# Patient Record
Sex: FEMALE | Race: OTHER | HISPANIC OR LATINO | URBAN - METROPOLITAN AREA
[De-identification: names, ages, dates, MRNs, and addresses within clinical notes are randomized per-mention and may not be internally consistent; named-entity substitution may affect disease eponyms.]

---

## 2017-06-01 ENCOUNTER — OUTPATIENT (OUTPATIENT)
Dept: OUTPATIENT SERVICES | Facility: HOSPITAL | Age: 47
LOS: 1 days | Discharge: HOME | End: 2017-06-01

## 2017-06-19 ENCOUNTER — APPOINTMENT (OUTPATIENT)
Dept: BREAST CENTER | Facility: CLINIC | Age: 47
End: 2017-06-19

## 2017-06-28 DIAGNOSIS — N93.0 POSTCOITAL AND CONTACT BLEEDING: ICD-10-CM

## 2017-07-05 ENCOUNTER — APPOINTMENT (OUTPATIENT)
Dept: BREAST CENTER | Facility: CLINIC | Age: 47
End: 2017-07-05

## 2017-07-05 VITALS
SYSTOLIC BLOOD PRESSURE: 124 MMHG | HEIGHT: 60 IN | BODY MASS INDEX: 28.47 KG/M2 | WEIGHT: 145 LBS | DIASTOLIC BLOOD PRESSURE: 80 MMHG

## 2017-12-11 ENCOUNTER — APPOINTMENT (OUTPATIENT)
Dept: BREAST CENTER | Facility: CLINIC | Age: 47
End: 2017-12-11

## 2018-09-12 ENCOUNTER — OUTPATIENT (OUTPATIENT)
Dept: OUTPATIENT SERVICES | Facility: HOSPITAL | Age: 48
LOS: 1 days | Discharge: HOME | End: 2018-09-12

## 2018-09-12 DIAGNOSIS — R92.2 INCONCLUSIVE MAMMOGRAM: ICD-10-CM

## 2018-09-12 DIAGNOSIS — Z12.31 ENCOUNTER FOR SCREENING MAMMOGRAM FOR MALIGNANT NEOPLASM OF BREAST: ICD-10-CM

## 2019-03-13 ENCOUNTER — OUTPATIENT (OUTPATIENT)
Dept: OUTPATIENT SERVICES | Facility: HOSPITAL | Age: 49
LOS: 1 days | Discharge: HOME | End: 2019-03-13

## 2019-03-13 DIAGNOSIS — R92.8 OTHER ABNORMAL AND INCONCLUSIVE FINDINGS ON DIAGNOSTIC IMAGING OF BREAST: ICD-10-CM

## 2019-03-18 ENCOUNTER — OUTPATIENT (OUTPATIENT)
Dept: OUTPATIENT SERVICES | Facility: HOSPITAL | Age: 49
LOS: 1 days | Discharge: HOME | End: 2019-03-18

## 2019-03-18 ENCOUNTER — RESULT REVIEW (OUTPATIENT)
Age: 49
End: 2019-03-18

## 2019-03-18 DIAGNOSIS — R92.8 OTHER ABNORMAL AND INCONCLUSIVE FINDINGS ON DIAGNOSTIC IMAGING OF BREAST: ICD-10-CM

## 2019-03-21 LAB — SURGICAL PATHOLOGY STUDY: SIGNIFICANT CHANGE UP

## 2019-03-25 DIAGNOSIS — N60.32 FIBROSCLEROSIS OF LEFT BREAST: ICD-10-CM

## 2019-03-25 DIAGNOSIS — N60.22 FIBROADENOSIS OF LEFT BREAST: ICD-10-CM

## 2019-03-25 DIAGNOSIS — N62 HYPERTROPHY OF BREAST: ICD-10-CM

## 2019-03-25 DIAGNOSIS — C50.912 MALIGNANT NEOPLASM OF UNSPECIFIED SITE OF LEFT FEMALE BREAST: ICD-10-CM

## 2019-03-25 DIAGNOSIS — N63.20 UNSPECIFIED LUMP IN THE LEFT BREAST, UNSPECIFIED QUADRANT: ICD-10-CM

## 2019-04-08 ENCOUNTER — APPOINTMENT (OUTPATIENT)
Dept: BREAST CENTER | Facility: CLINIC | Age: 49
End: 2019-04-08
Payer: COMMERCIAL

## 2019-04-08 DIAGNOSIS — Z78.9 OTHER SPECIFIED HEALTH STATUS: ICD-10-CM

## 2019-04-08 PROCEDURE — 99214 OFFICE O/P EST MOD 30 MIN: CPT

## 2019-04-08 NOTE — HISTORY OF PRESENT ILLNESS
[FreeTextEntry1] : Frances is a 48 premenopausal F who presents with a US detected L breast cancer, ER/NV positive, her 2 PENDING, [id number].  \par \par Her work up was as follows: \par 3/13/19 -- L dx tomosynthesis; b/l US \par RIGHT \par -@1N2, mass measuring 1.2 x 1.4 x 0.5 cm, no change, deemed BIRADS 3\par -@6N4, mass measuring 0.6 x 0.9 x 0.4 cm, no change, deemed BIRADS 3\par LEFT: \par -@12N2, cyst measuring 1 x 1 x 0.8 cm \par -@5N5, mass measuring 1.2 x 1.4 x 0.5 cm, no change, deemed BIRADS 3\par -@3-4N4, il defined mass, measuring 0.5 x 0.7 x 0.6 cm, mammographically occult, BIOPSY \par BIRADS 4 of above lesion\par \par 3/18/19 -- L US CNBx @3-4N4\par -micropscopic IDC, well differentiated\par -ER/NV positive, Her 2 pending (Abel )\par \par She denies any breast pain, but has palpated a RIGHT lateral breast lesion that has been present for 5 years and remains unchanged.  She has not palapted any abnormal mass in her LEFT breast, denies any nipple discharge or retraction and has no other breast related complaints. \par \par HISTORICAL RISK FACTORS: \par -1 prior breast biopsy as above \par -no family history of breast or ovarian cancer \par - , age at first live birth was 24\par -no prior OCP use

## 2019-04-08 NOTE — PAST MEDICAL HISTORY
[Menstruating] : The patient is menstruating [Menarche Age ____] : age at menarche was [unfilled] [Total Preg ___] : G[unfilled] [Live Births ___] : P[unfilled]  [Age At Live Birth ___] : Age at live birth: [unfilled] [History of Hormone Replacement Treatment] : has no history of hormone replacement treatment [FreeTextEntry5] : denies  [FreeTextEntry6] : denies [FreeTextEntry7] : denies  [FreeTextEntry8] : yes in past

## 2019-04-08 NOTE — PHYSICAL EXAM
[Normocephalic] : normocephalic [Atraumatic] : atraumatic [EOMI] : extra ocular movement intact [No Supraclavicular Adenopathy] : no supraclavicular adenopathy [No Cervical Adenopathy] : no cervical adenopathy [Examined in the supine and seated position] : examined in the supine and seated position [Symmetrical] : symmetrical [No dominant masses] : no dominant masses in right breast  [No dominant masses] : no dominant masses left breast [No Nipple Retraction] : no left nipple retraction [No Nipple Discharge] : no left nipple discharge [de-identified] : dense breast tissue in b/l UOQ, but no suspicious masses palpated in either breast

## 2019-04-08 NOTE — ASSESSMENT
[FreeTextEntry1] : Frances is a 48 premenopausal F who has LEFT breast cancer, microinvasive on pathology, [id number], ER/TN positive, Her 2 pending, stage 1 A breast cancer. \par \par This was not readily palpable on exam and she did not have any other suspicious lesions palpated in either breast nor did she have any lymphadenopathy.  \par \par We have discussed her new diagnosis of breast cancer.  She is currently a stage 1 breast cancer, based off AJCC 8th edition.  We discussed her surgical and other treatment options at this time. \par \par In regards to her left sided breast cancer, she is a great candidate for either breast conservation surgery or a mastectomy.  This lesion is located about 4 cm from the nipple based off the US so a nipple sparing mastectomy could be attempted, as long as the intraop frozen section shows that the retroareolar margin is negative for invasive disease.  There is no difference in survival between a lumpectomy + radiation therapy and a mastectomy.  However the rate of local recurrence is slightly higher with the lumpectomy. The rate of recurrence with a mastectomy is not zero, however, and is likely closer to 4-9%.  \par \par Regardless of if she chooses a mastectomy or a lumpectomy, she will need evaluation of her axillary lymph nodes via a sentinel lymph node biopsy.  This is done by injecting the perioareolar breast tissue with dye prior to the surgery and removing 1-5 lymph nodes that are the first to drain the breast.  If any of these lymph nodes are positive, we would need to discuss the necessity for further axillary surgery at that point. \par \par She is not sure whether or not she would like to pursue breast conservation vs. a mastectomy, but is leaning towards a lumpectomy.  Because her left breast lesion is not palpable on clinical exam, so she will need preoperative localization with an RF localizer for the left breast mass.  In the interim, we will obtain a bilateral breast MRI to further evaluate the extent of her disease, given her young age and dense breasts on mammogram. \par \par We did briefly discuss the different radiation options.  If she got a mastectomy, she would likely only need radiation therapy if she had a lot of positive margins or if her axillary lymph nodes were positive for metastatic disease.  If she undergoes a lumpectomy, she is a candidate for both partial breast irradiation and whole breast irradiation, pending her Her2 receptor status.  We briefly discussed the differences between these two modalities.\par \par In regards to systemic therapy, we briefly discussed both chemotherapy and endocrine therapy. Her Her2 status is still pending, so these recommendations could change based off the final pathology.  However, if her lymph nodes are negative, than she may not require any chemotherapy.  If the tumor is larger than 1 cm and her 2 negative, we would likely need to send an additional study on her tumor sample, called an oncotype DX to make the determination regarding if chemotherapy would help her.  At the completion of all these treatments, she should get endocrine therapy, at current time she is premenopausal and would be recommended to get tamoxifen for a total of at least 5 years.  \par \par In regards to reconstruction, if she decides to proceed with a mastectomy,  she has the option for undergoing immediate breast reconstruction.  \par \par She will need a breast MRI to determine extent of disease.  This will be scheduled for her. \par \par Because of her young age at diagnosis, she qualifies for genetic testing.  COLOR genetic testing will be performed today. \par \par All of her questions were answered.  She knows to call with any further questions or concerns. I will see her after she gets her MRI breast. \par \par Of note, her daughter was present for the entirety of the consultation. \par  phone was used: Javier #[id number]\par \par PLAN\par -breast MRI \par -COLOR genetic testing\par -if breast MRI does not reveal any additional areas of disease, then she will proceed with a LEFT WIDE LOCAL EXCISION WITH RF LOCALIZER, SENTINEL LYMPH NODE BIOPSY, POSSIBLE AXILLARY LYMPH NODE DISSECTION\par -DIAGNOSIS: LEFT BREAST CANCER

## 2019-04-08 NOTE — DATA REVIEWED
[FreeTextEntry1] : EXAM: US BREAST COMPLETE BI \par EXAM: MG TOMOSYNTHESIS DX BI \par \par \par PROCEDURE DATE: 09/12/2018 \par \par \par \par INTERPRETATION: Clinical History / Reason for exam: Short-term follow-up of \par bilateral probably benign masses first identified on 6/1/2017 \par \par The patient reports her last clinical breast examination was performed 1 \par month ago. \par \par Family history: None. \par \par Comparisons: 6/1/2017. \par \par Views obtained:Bilateral tomographic full field CC and MLO views. \par \par Computer-aided detection was utilized in the interpretation of this \par examination. \par \par Breast composition:The breasts are heterogeneously dense, which may obscure \par small masses. \par \par Findings: \par \par Mammogram: \par \par No suspicious mass, microcalcifications or areas of architectural distortion \par is seen either breast. When compared to the previous examinations there is \par no significant interval change and nothing to suggest malignancy. \par \par Ultrasound: \par \par Bilateral whole breast ultrasound was performed. \par \par Right breast: \par At the 1:00 position 2 cm the nipple, there is a stable probably benign mass \par measuring 0.7 x 1.5 x 0.5 cm. \par \par At the 6:00 position 4 cm the nipple, there is a stable probably benign mass \par measuring 0.9 x 1.3 x 0.5 cm. \par \par No additional suspicious solid or cystic masses are identified in the right \par breast. No axillary adenopathy. \par \par Left breast: \par \par At the 5:00 position 5 cm from nipple, there is a stable probably benign \par hypoechoic mass measuring 1.3 x 1.4 x 0.6 cm. This was previously labeled as \par 4 to 5:00 position 5 cm from the nipple. \par \par At the 12:00 position 8 cm from the nipple, there is a stable probably \par benign mass measuring 0.8 x 0.8 x 0.4 cm. \par \par Previously identified mass at the 7 to 8:00 position 3 cm the nipple is not \par seen on the current exam and is therefore benign in etiology. \par \par No additional suspicious solid or cystic masses are identified in the left \par breast. No axillary adenopathy. \par \par Impression: No mammographic evidence of malignancy. Stable bilateral \par probably benign masses as above. \par \par Recommendation: Follow-up breast ultrasound in 6 months. Targeted 6 month \par sonographic follow-up of the right breast 1:00 position and 6:00 position \par and the left breast 5:00 position and 12:00 position is recommended. \par \par BI-RADS category 3: Probably Benign \par \par \par The above findings and recommendations were discussed with the patient at \par the time of the examination. \par \par \par \par \par \par \par EDY GRADY M.D., ATTENDING RADIOLOGIST \par This document has been electronically signed. Sep 12 2018 12:46PM \par \par EXAM: MG MAMMO DIAG W CRISTHIAN LT# \par EXAM: US BREAST LIMITED BI \par \par *** ADDENDUM 03/20/2019 *** \par \par This is a correction to the original report. \par \par Recommendation: Ultrasound guided biopsy. \par \par BI-RADS Category 4: Suspicious \par \par \par *** END OF ADDENDUM 03/20/2019 *** \par \par \par \par PROCEDURE DATE: 03/13/2019 \par \par \par \par INTERPRETATION: Clinical History / Reason for exam: Patient presents for \par follow-up of probably benign right breast masses at the 1:00 and 6:00 \par location and left breast at the 5:00 and 12:00 location since June 2017. \par \par Diagnostic unilateral left mammogram including tomosynthesis was performed \par and submitted for evaluation. Comparison is made to the prior study dated \par September 12, 2018 and June 1, 2017. \par \par Targeted Bilateral Breast Sonogram: \par Again identified is a mass in the right breast 1:00 location 2 cm from the \par nipple which is without significant change measuring 0.7 cm x 1.3 cm x 0.5 \par cm and at the 6:00 location 4 cm from the nipple measuring 0.6 cm x 0.9 cm x \par 0.4 cm. These are probably benign. \par \par In the left breast at the 12:00 position 2 cm from the nipple, there is a \par cyst measuring 1.0 cm x 1.0 cm x 0.8 cm. No suspicious masses are seen in \par this location. Again identified in the left breast at the 5:00 location 5 cm \par from the nipple is a mass measuring 1.2 cm x 1.4 cm x 0.5 cm which is \par without significant change and probably benign. \par \par At the 3-4 o'clock location 4 cm from the nipple, there is a ill-defined \par mass measuring 0.5 cm x 0.7 cm x 0.6 cm which is not seen mammographically. \par An ultrasound guided core biopsy is recommended. \par \par Impression: Stable probably benign right breast masses at the 1:00 location \par 2 cm from the nipple and 6:00 location 4 cm from the nipple as above. \par \par Stable probably benign left breast mass at the 5:00 location 5 cm from the \par nipple as above. Left breast cyst at the 12:00 location 2 cm from the nipple \par as above. \par \par Mammographically occult ill-defined left breast mass at the 3-4 o'clock \par location 4 cm from the nipple as above. An ultrasound guided core biopsy is \par recommended. \par \par Recommendation: Follow-up bilateral diagnostic mammogram and ultrasound in 6 \par months. \par \par [facility] Barker of Dr. Shyanne Godinez was notified of these findings on March \par 15, 2018 at 9:00 AM with read back. \par \par BI-RADS category 3: Probably Benign \par \par \par ***Please see the addendum at the top of this report. It may contain \par additional important information or changes.**** \par \par \par \par \par MADDIE STEINER M.D., ATTENDING RADIOLOGIST \par This document has been electronically signed. Mar 15 2019 9:00AM \par Addend: MADDIE STEINER M.D., ATTENDING RADIOLOGIST \par This addendum was electronically signed on: Mar 20 2019 11:59AM. \par \par EXAM: US BX BRST 1ST LT SISC \par \par *** ADDENDUM 03/22/2019 *** \par \par Histology: \par - microscopic focus of invasive well differentiated ductal \par carcinoma \par - proliferative type fibrocystic changes including dominant\par florid duct hyperplasia, stromal fibrosis, sclerosing adenosis, \par and columnar cell change/hyperplasia. \par \par Findings a malignant concordant. \par \par Recommendation: Surgical and oncologic follow-up is recommended. \par \par The findings and recommendation were discussed with the patient on 3/22/2019 \par at 10:23 AM through a . \par \par The findings and recommendation were discussed with Dr. Carlton on 3/22/2019 \par at 10:00 AM with read back. \par \par \par *** END OF ADDENDUM 03/22/2019 *** \par \par \par \par PROCEDURE DATE: 03/18/2019 \par \par \par \par INTERPRETATION: Clinical History / Reason for exam: Indeterminate left \par breast mass. \par \par Images were reviewed. Informed consent was obtained including a discussion \par of risks and benefits of the procedure as well as questioning about patient \par allergies. The patient safety checklist, including time out procedure, was \par used. \par \par The mass in the 3 to 4 o clock position 4 cm from the nipple was identified. \par Under sterile conditions and after local infiltration with 5 mL buffered 1% \par lidocaine a 14g spring loaded core biopsy needle was inserted. Under \par ultrasound guidance the needle was positioned adjacent to the mass and 4 \par core samples were obtained. The needle was removed. A marking clip ([employer] \par top-hat) was deployed under ultrasound guidance. Manual compression was \par applied to achieve hemostasis. \par \par The patient tolerated the procedure well and there was no immediate post \par procedure complication. The specimens were sent to the laboratory for \par analysis. \par \par A post procedure mammogram was obtained and demonstrated the clip to be in \par good position. \par \par IMPRESSION: \par \par Successful ultrasound guided core biopsy of the left breast. \par \par Histology: Pending, to be reported in an addendum. \par \par ***Please see the addendum at the top of this report. It may contain \par additional important information or changes.**** \par \par \par \par \par EDY GRADY M.D., ATTENDING RADIOLOGIST \par This document has been electronically signed. Mar 18 2019 3:33PM \par Addend: EDY GRADY M.D., ATTENDING RADIOLOGIST \par This addendum was electronically signed on: Mar 22 2019 10:23AM. \par \par \par

## 2019-04-18 ENCOUNTER — FORM ENCOUNTER (OUTPATIENT)
Age: 49
End: 2019-04-18

## 2019-04-19 ENCOUNTER — OUTPATIENT (OUTPATIENT)
Dept: OUTPATIENT SERVICES | Facility: HOSPITAL | Age: 49
LOS: 1 days | Discharge: HOME | End: 2019-04-19
Payer: OTHER GOVERNMENT

## 2019-04-19 DIAGNOSIS — C50.412 MALIGNANT NEOPLASM OF UPPER-OUTER QUADRANT OF LEFT FEMALE BREAST: ICD-10-CM

## 2019-04-19 PROCEDURE — 77049 MRI BREAST C-+ W/CAD BI: CPT | Mod: 26

## 2019-04-21 ENCOUNTER — FORM ENCOUNTER (OUTPATIENT)
Age: 49
End: 2019-04-21

## 2019-04-21 ENCOUNTER — RESULT CHARGE (OUTPATIENT)
Age: 49
End: 2019-04-21

## 2019-04-22 ENCOUNTER — APPOINTMENT (OUTPATIENT)
Dept: INTERNAL MEDICINE | Facility: CLINIC | Age: 49
End: 2019-04-22

## 2019-04-22 ENCOUNTER — OUTPATIENT (OUTPATIENT)
Dept: OUTPATIENT SERVICES | Facility: HOSPITAL | Age: 49
LOS: 1 days | Discharge: HOME | End: 2019-04-22
Payer: SUBSIDIZED

## 2019-04-22 ENCOUNTER — OUTPATIENT (OUTPATIENT)
Dept: OUTPATIENT SERVICES | Facility: HOSPITAL | Age: 49
LOS: 1 days | Discharge: HOME | End: 2019-04-22

## 2019-04-22 ENCOUNTER — OTHER (OUTPATIENT)
Age: 49
End: 2019-04-22

## 2019-04-22 VITALS
SYSTOLIC BLOOD PRESSURE: 133 MMHG | DIASTOLIC BLOOD PRESSURE: 85 MMHG | WEIGHT: 173 LBS | BODY MASS INDEX: 33.96 KG/M2 | HEART RATE: 59 BPM | HEIGHT: 60 IN | TEMPERATURE: 97.6 F

## 2019-04-22 DIAGNOSIS — Z01.818 ENCOUNTER FOR OTHER PREPROCEDURAL EXAMINATION: ICD-10-CM

## 2019-04-22 DIAGNOSIS — C50.412 MALIGNANT NEOPLASM OF UPPER-OUTER QUADRANT OF LEFT FEMALE BREAST: ICD-10-CM

## 2019-04-22 PROCEDURE — 71045 X-RAY EXAM CHEST 1 VIEW: CPT | Mod: 26

## 2019-04-22 NOTE — PHYSICAL EXAM
[Well Nourished] : well nourished [No Acute Distress] : no acute distress [Normal Sclera/Conjunctiva] : normal sclera/conjunctiva [Well Developed] : well developed [Well-Appearing] : well-appearing [PERRL] : pupils equal round and reactive to light [EOMI] : extraocular movements intact [Normal Outer Ear/Nose] : the outer ears and nose were normal in appearance [No JVD] : no jugular venous distention [Normal Oropharynx] : the oropharynx was normal [Supple] : supple [No Lymphadenopathy] : no lymphadenopathy [Thyroid Normal, No Nodules] : the thyroid was normal and there were no nodules present [Clear to Auscultation] : lungs were clear to auscultation bilaterally [No Respiratory Distress] : no respiratory distress  [Normal Rate] : normal rate  [No Accessory Muscle Use] : no accessory muscle use [Regular Rhythm] : with a regular rhythm [Normal S1, S2] : normal S1 and S2 [No Carotid Bruits] : no carotid bruits [No Murmur] : no murmur heard [No Varicosities] : no varicosities [No Abdominal Bruit] : a ~M bruit was not heard ~T in the abdomen [Pedal Pulses Present] : the pedal pulses are present [No Edema] : there was no peripheral edema [No Extremity Clubbing/Cyanosis] : no extremity clubbing/cyanosis [Soft] : abdomen soft [No Palpable Aorta] : no palpable aorta [Non-distended] : non-distended [Non Tender] : non-tender [No HSM] : no HSM [Normal Bowel Sounds] : normal bowel sounds [No Masses] : no abdominal mass palpated [Normal Posterior Cervical Nodes] : no posterior cervical lymphadenopathy [Normal Anterior Cervical Nodes] : no anterior cervical lymphadenopathy [No Joint Swelling] : no joint swelling [No CVA Tenderness] : no CVA  tenderness [No Spinal Tenderness] : no spinal tenderness [Grossly Normal Strength/Tone] : grossly normal strength/tone [No Rash] : no rash [Normal Gait] : normal gait [Coordination Grossly Intact] : coordination grossly intact [No Focal Deficits] : no focal deficits [Deep Tendon Reflexes (DTR)] : deep tendon reflexes were 2+ and symmetric [Normal Affect] : the affect was normal [Normal Insight/Judgement] : insight and judgment were intact [de-identified] : MILD TENDERNESS IN LEFT BREAST

## 2019-04-22 NOTE — ASSESSMENT
[FreeTextEntry1] : 47 yo female with left breast mass for pre op clearance.\par non smoker, non alcoholic\par no past medical or surgical hx\par \par Left breast cancer, stage 1, ER/AR+\par will send pre op lab testing\par follow up in 2 weeks.\par \par

## 2019-04-22 NOTE — HISTORY OF PRESENT ILLNESS
[de-identified] : 47 yo female is here for pre op clearance for breast cancer surgery.\par no new complaints.\par patient was positive for left breast mass in mammogram and s/p biopsy.\par no any significant medical history.\par non smoker non alcoholic\par no family Hx of cancer\par no pain in breast or discharge\par LMP march 24.

## 2019-04-30 ENCOUNTER — FORM ENCOUNTER (OUTPATIENT)
Age: 49
End: 2019-04-30

## 2019-05-01 ENCOUNTER — OUTPATIENT (OUTPATIENT)
Dept: OUTPATIENT SERVICES | Facility: HOSPITAL | Age: 49
LOS: 1 days | Discharge: HOME | End: 2019-05-01
Payer: SUBSIDIZED

## 2019-05-01 DIAGNOSIS — R91.1 SOLITARY PULMONARY NODULE: ICD-10-CM

## 2019-05-01 PROCEDURE — 71260 CT THORAX DX C+: CPT | Mod: 26

## 2019-05-07 ENCOUNTER — FORM ENCOUNTER (OUTPATIENT)
Age: 49
End: 2019-05-07

## 2019-05-08 ENCOUNTER — RESULT REVIEW (OUTPATIENT)
Age: 49
End: 2019-05-08

## 2019-05-08 ENCOUNTER — OUTPATIENT (OUTPATIENT)
Dept: OUTPATIENT SERVICES | Facility: HOSPITAL | Age: 49
LOS: 1 days | Discharge: HOME | End: 2019-05-08
Payer: SUBSIDIZED

## 2019-05-08 DIAGNOSIS — R92.8 OTHER ABNORMAL AND INCONCLUSIVE FINDINGS ON DIAGNOSTIC IMAGING OF BREAST: ICD-10-CM

## 2019-05-08 PROCEDURE — 88305 TISSUE EXAM BY PATHOLOGIST: CPT | Mod: 26

## 2019-05-08 PROCEDURE — 19085 BX BREAST 1ST LESION MR IMAG: CPT | Mod: RT

## 2019-05-08 PROCEDURE — 19086 BX BREAST ADD LESION MR IMAG: CPT | Mod: RT

## 2019-05-08 PROCEDURE — 77065 DX MAMMO INCL CAD UNI: CPT | Mod: 26,RT

## 2019-05-09 LAB — SURGICAL PATHOLOGY STUDY: SIGNIFICANT CHANGE UP

## 2019-05-16 ENCOUNTER — APPOINTMENT (OUTPATIENT)
Dept: BREAST CENTER | Facility: CLINIC | Age: 49
End: 2019-05-16
Payer: COMMERCIAL

## 2019-05-16 LAB
ALBUMIN SERPL ELPH-MCNC: 4.3 G/DL
ALP BLD-CCNC: 64 U/L
ALT SERPL-CCNC: 11 U/L
ANION GAP SERPL CALC-SCNC: 11 MMOL/L
APTT BLD: 35.7 SEC
AST SERPL-CCNC: 13 U/L
BILIRUB SERPL-MCNC: 0.3 MG/DL
BUN SERPL-MCNC: 15 MG/DL
CALCIUM SERPL-MCNC: 9.1 MG/DL
CHLORIDE SERPL-SCNC: 103 MMOL/L
CO2 SERPL-SCNC: 25 MMOL/L
CREAT SERPL-MCNC: 0.9 MG/DL
GLUCOSE SERPL-MCNC: 81 MG/DL
INR PPP: 0.96 RATIO
POTASSIUM SERPL-SCNC: 4.6 MMOL/L
PROT SERPL-MCNC: 7.3 G/DL
PT BLD: 11 SEC
SODIUM SERPL-SCNC: 139 MMOL/L

## 2019-05-16 PROCEDURE — 99214 OFFICE O/P EST MOD 30 MIN: CPT

## 2019-05-16 NOTE — DATA REVIEWED
[FreeTextEntry1] : EXAM: MR BREAST WAW IC BI \par \par \par PROCEDURE DATE: 04/19/2019 \par \par \par \par \par INTERPRETATION: Clinical History / Reason for exam: Extent of disease. \par Recent diagnosis of invasive ductal carcinoma at the left breast 3 to 4:00 \par position. \par \par Additional history: No family history of breast cancer. \par \par Technique: Breast MRI is performed at 1.5 T with the patient prone and the \par breasts in a dedicated breast coil. Following a 3 plane localizer, sagittal \par T1 weighted, fat-saturated T1 weighted and fat saturated T2-weighted \par sequence; dynamic contrast enhanced sagittal images; and delayed \par post-contrast axial fat-saturated T1 weighted images were obtained. 7 mL \par gadolinium contrast was injected and 0.5 mL was discarded. Subtraction and \par MIP images were reviewed. DorsaVI software was used in interpretation. \par \par Comparison: No prior MRI for comparison. Correlation made with studies \par dating back to 2017. \par \par Findings: \par \par Amount of fibroglandular tissue: Heterogeneous fibroglandular tissue \par \par Background parenchymal enhancement: Marked, Symmetric; lowers the \par sensitivity of breast MRI. \par \par RIGHT BREAST: \par \par - In the inferior right breast, there is regional nonmass enhancement \par measuring up to 6.7 cm in AP dimension. This is best seen on series 700, \par image 175 and series 8, image 33. \par - There are multiple cysts. \par - The nipple and skin appear normal. There is no axillary adenopathy. \par \par LEFT BREAST: \par \par - In the lateral left breast middle depth, there is an irregular enhancing \par mass measuring 0.9 cm with associated susceptibility artifact, consistent \par with the index lesion. \par - There are multiple cysts. \par - The nipple and skin appear normal. There is no axillary adenopathy. \par \par The imaged portions of the chest and abdomen demonstrates a signal \par abnormality in the anterior right lung base. (Series 8, image 51). \par \par Impression: \par \par Index left breast lesion as above. Scattered bilateral cysts. \par \par Indeterminate regional enhancement measuring up to 6.7 cm in AP dimension. \par MRI guided biopsy of the most anterior and posterior extent is recommended. \par \par Signal abnormality in the anterior right lung base. Follow-up chest CT for \par further characterization is recommended. \par \par Recommendation: MRI guided biopsy. \par \par BI-RADS Category 4: Suspicious \par \par The above findings and recommendations were discussed with Dr. Tiwari on \par 4/22/2019 at 5:00 PM. \par \par \par \par \par EDWAR PEREZ M.D., RESIDENT RADIOLOGIST \par This document has been electronically signed. \par EDY GRADY M.D., ATTENDING RADIOLOGIST \par This document has been electronically signed. Apr 22 2019 5:00PM \par \par EXAM: MR BX BRST 1ST RT SISC \par EXAM: MR BX BRST ADD RT SISC \par \par *** ADDENDUM 05/09/2019 *** \par \par HISTOLOGY: FINAL DIAGNOSIS \par \par 1. BREAST, RIGHT NON-MASS LIKE ENHANCEMENT SPANNING 6.7 CM ANTERIOR EXTENT, \par MRI GUIDED NEEDLE CORE BIOPSIES: \par \par - BENIGN BREAST TISSUE WITH PROLIFERATIVE TYPE FIBROCYSTIC CHANGES INCLUDING \par USUAL TYPE DUCT HYPERPLASIA, STROMAL FIBROSIS WITH PSEUDOANGIOMATOUS STROMAL \par HYPERPLASIA (PASH), ADENOSIS, BLUNT DUCT ADENOSIS, COLUMNAR CELL \par CHANGE/HYPERPLASIA WITHOUT \par ATYPIA, APOCRINE METAPLASIA, AND MICROCALCIFICATIONS. \par - FIBROADENOMATOID NODULE. \par - FOCAL SECRETORY/LACTATIONAL CHANGES ASSOCIATED WITH \par CALCIFICATIONS. \par \par This is benign concordant histology. \par \par \par \par 2. BREAST, RIGHT NON-MASS LIKE ENHANCEMENT SPANNING 6.7 CM POSTERIOR EXTENT, \par MRI GUIDED NEEDLE CORE BIOPSIES: \par \par - BENIGN BREAST TISSUE WITH PROLIFERATIVE TYPE FIBROCYSTIC CHANGES INCLUDING \par USUAL TYPE DUCT HYPERPLASIA, STROMAL FIBROSIS, ADENOSIS, SCLEROSING \par ADENOSIS, BLUNT DUCT ADENOSIS, APOCRINE METAPLASIA-LINED CYSTS, COLUMNAR \par CELL CHANGE/HYPERPLASIA WITHOUT \par ATYPIA, AND MICROCALCIFICATIONS. \par - FOCAL SECRETORY/LACTATIONAL CHANGES. \par \par This is benign concordant histology. \par \par The patient was notified of the above benign concordant histologic results \par on May 9, 2018 at 3:50 PM with read back. The patient was told to follow-up \par in 6 months for a breast MRI as per guidelines. \par \par \par *** END OF ADDENDUM 05/09/2019 *** \par \par \par \par PROCEDURE DATE: 05/08/2019 \par \par \par \par \par INTERPRETATION: Clinical History / Reason for exam: MRI guided biopsy of \par the right breast for nonmass enhancement in the lower inner quadrant \par spanning 6.7 cm. \par \par After the risks, benefits alternatives of MRI guided biopsy were discussed \par with the patient, informed consent was obtained, including consent for MRI \par intravenous contrast. \par \par Technique: MR imaging of the right breast was performed using a 1.5 abhilash GE \par magnet, dedicated 8 channel breast coil and Envia LÃ¡E platform. 7 mls of MRI \par intravenous contrast was administered. IV access was obtained on site. The \par examination was performed with a vitamin E capsule placed on the skin, and \par biopsy grid in place. Sequences include sagittal dynamic fat suppressed pre \par and post MRI intravenous contrast gradient echo imaging. \par \par Findings: \par \par SITE 1: Right breast nonmass enhancement in the lower inner quadrant \par (anterior extent). \par \par Using the usual sterile technique and 1% lidocaine as superficial \par anesthesia, and lidocaine with epinephrine as deep anesthesia a small \par dermatotomy was made in the skin. Under MR guidance, using a 9 gauge \par vacuum-assisted device, the previously described nonmass enhancement in the \par lower inner quadrant of the right breast (anterior extent) was sampled, with \par 12 core biopsies obtained. Post biopsy imaging in the sagittal plane \par demonstrated a visible cavity, and diminished enhancement, at the biopsy \par site. A hourglass-shaped biopsy clip was placed in the biopsy cavity. \par \par \par SITE 2: Right breast nonmass enhancement lower inner quadrant of (posterior \par extent). \par \par Using the usual sterile technique and 1% lidocaine as superficial \par anesthesia, and lidocaine with epinephrine as deep anesthesia a small \par dermatotomy was made in the skin. Under MR guidance, using a 9 gauge \par vacuum-assisted device, the previously described nonmass enhancement in the \par lower inner quadrant of the right breast (posterior extent) was sampled, \par with 12 core biopsies obtained. Post biopsy imaging in the sagittal plane \par demonstrated a visible cavity, and diminished enhancement, at the biopsy \par site. A mini-cork shaped biopsy clip was placed in the biopsy cavity. \par \par Hemostases was maintained with manual pressure to each biopsy site. Sterile \par dressing was applied to each biopsy site. \par \par The patient was then escorted, [facility], with an \par unilateral right mammogram was performed and demonstrates both clips to be \par in their expected locations. \par \par The patient tolerated the procedure well and was discharged home in stable \par condition, with written discharge instructions. \par \par Impression: Successful MR guided biopsy of right breast x 2. \par \par Histopathology:Pending, to be reported in an addendum. \par \par ***Please see the addendum at the top of this report. It may contain \par additional important information or changes.**** \par \par \par \par \par MADDIE STEINER M.D., ATTENDING RADIOLOGIST \par This document has been electronically signed. May 8 2019 11:15AM \par Addend: MADDIE STEINER M.D., ATTENDING RADIOLOGIST \par This addendum was electronically signed on: May 9 2019 3:56PM. \par \par EXAM: CT CHEST IC \par \par \par PROCEDURE DATE: 05/01/2019 \par \par \par \par \par INTERPRETATION: CLINICAL HISTORY: Anterior right lung base signal \par abnormality seen incidentally on MR breasts. \par \par Technique: Contiguous axial CT images were obtained from the thoracic inlet \par to the upper abdomen, following the administration of intravenous contrast. \par Reconstructions in the coronal and sagittal planes were also acquired. \par \par COMPARISON: No prior cross-sectional imaging the chest is available for \par direct comparison. Correlation is made with MR breast dated 4/19/2019. \par \par FINDINGS: \par \par LUNGS, PLEURA, AIRWAYS: Focal atelectasis of the anterior right middle lobe. \par No lobar consolidations, pleural effusions, or pneumothorax. The central \par tracheobronchial tree is patent. No bronchiectasis or honeycombing. \par \par PULMONARY NODULES: \par \par 3 mm solid nodule, right upper lobe (series 4, image 58). \par \par THORACIC NODES: No mediastinal, hilar, or axillary lymphadenopathy. \par \par MEDIASTINUM/GREAT VESSELS: No pericardial effusion. Heart size is within \par normal limits. The aorta and main pulmonary artery are of normal caliber. \par \par PARTIALLY IMAGED ABDOMEN: Cholelithiasis. \par \par BONES/SOFT TISSUES: Unremarkable. \par \par \par IMPRESSION: \par \par 3 mm right upper lobe solid nodule. Per Fleischner guidelines, no further \par imaging is required for this lesion in low-risk patients. In high-risk \par patients, follow-up CT in 12 months may be considered to establish stability. \par \par Focal atelectasis of the anterior right middle lobe. \par \par \par \par \par \par SHAILESH VILLARREAL M.D., RESIDENT RADIOLOGIST \par This document has been electronically signed. \par DIANNA STEWART M.D., ATTENDING RADIOLOGIST \par This document has been electronically signed. May 2 2019 11:15AM \par

## 2019-05-16 NOTE — PHYSICAL EXAM
[Normocephalic] : normocephalic [Atraumatic] : atraumatic [EOMI] : extra ocular movement intact [No Cervical Adenopathy] : no cervical adenopathy [No Supraclavicular Adenopathy] : no supraclavicular adenopathy [Examined in the supine and seated position] : examined in the supine and seated position [No dominant masses] : no dominant masses in right breast  [Symmetrical] : symmetrical [No dominant masses] : no dominant masses left breast [No Nipple Retraction] : no left nipple retraction [No Nipple Discharge] : no left nipple discharge [No Axillary Lymphadenopathy] : no left axillary lymphadenopathy [Soft] : abdomen soft [Not Tender] : non-tender [No Edema] : no edema [No Rashes] : no rashes [No Ulceration] : no ulceration [de-identified] : dense breast tissue in b/l UOQ [de-identified] : ecchymoses present in the LIQ in area of her most recent MRI guided biopsy  [de-identified] : has a nodularity in the lateral breast, may be a hematoma vs. her known breast cancer; no other suspicious masses palpated

## 2019-05-16 NOTE — REVIEW OF SYSTEMS
[Negative] : Heme/Lymph [As Noted in HPI] : as noted in HPI [Breast Lump] : breast lump [Skin Lesions] : no skin lesions [Skin Wound] : no skin wound [Breast Pain] : no breast pain

## 2019-05-16 NOTE — HISTORY OF PRESENT ILLNESS
[FreeTextEntry1] : Frances is a 48 premenopausal F who presents with a US detected L breast cancer, ER/LA positive, her 2 unable to be tested, [id number] stage 1 breast cancer.  \par \par Her work up was as follows: \par 3/13/19 -- L dx tomosynthesis; b/l US \par RIGHT \par -@1N2, mass measuring 1.2 x 1.4 x 0.5 cm, no change, deemed BIRADS 3\par -@6N4, mass measuring 0.6 x 0.9 x 0.4 cm, no change, deemed BIRADS 3\par LEFT: \par -@12N2, cyst measuring 1 x 1 x 0.8 cm \par -@5N5, mass measuring 1.2 x 1.4 x 0.5 cm, no change, deemed BIRADS 3\par -@3-4N4, il defined mass, measuring 0.5 x 0.7 x 0.6 cm, mammographically occult, BIOPSY \par BIRADS 4 of above lesion\par \par 3/18/19 -- L US CNBx @3-4N4\par -micropscopic IDC, well differentiated\par -ER/LA positive, Her 2 pending (Abel )\par \par 19 -- breast MRI \par R: 6.7 cm nonmass enhancement in inferior R breast --> rec BIOPSY of anterior and posterior aspect \par L: lateral mid depth, irregular enhancing mass measuring 0.9 cm consistent with known cancer \par -b/l breast cysts \par -abnormal signal in anterior right lung base \par \par 19 -- CT Chest \par -3 mm solid nodule in RUL \par -rec: no further imaging if patient is low risk \par \par 19 -- R MRI guided biopsy x 2 \par -benign proliferative type FC changes x 2\par \par COLOR genetic testing \par -negative for any mutations \par \par She denies any breast pain, but has palpated a RIGHT lateral breast lesion that has been present for 5 years and remains unchanged.  She has not palapted any abnormal mass in her LEFT breast, denies any nipple discharge or retraction and has no other breast related complaints. \par \par HISTORICAL RISK FACTORS: \par -1 prior breast biopsy as above \par -no family history of breast or ovarian cancer \par - , age at first live birth was 24\par -no prior OCP use \par \par INTERVAL HISTORY: \carlos Daniels is a 48 premenopausal F with left breast cancer.  She presents today to discuss her surgical planning. \par \par Since her last visit, she underwent a breast MRI ON 19 which revealed 6.7 cm of nonmass enhancement in her right breast.  The most anterior and posterior aspect of this area was biopsied under MRI guidance and was found to be benign.  She also had a nonspecific signal abnormality in her right anterior lung base so a chest CT was recommended.  This was performed on 19 which revealed a 3mm solid nodule in her RUL for which no further imaging was recommended as she was a low risk patient.  \par \par She has no new breast related complaints

## 2019-05-16 NOTE — ASSESSMENT
[FreeTextEntry1] : Frances is a 48 premenopausal F who has LEFT breast cancer, microinvasive on pathology, [id number], ER/PA positive, Her 2 unable to be tested on her biopsy specimen, stage 1 A breast cancer. \par \par ON exam, she had resolving ecchymoses in her right breast in the area of her recent biopsy, and in her left breast @3-4:00, 3 cm FN, she has a nodularity which may be her biopsy proven cancer vs. a hematoma.  She did not have any other suspicious lesions palpated in either breast nor did she have any lymphadenopathy.  \par \par Since her last visit, she underwent a breast MRI which revealed nonmass enhancement spanning 6.7 cm in her inferior breast which was biopsy proven benign proliferative type fibrocystic changes.  No additional abnormalities were noted in her left breast.  \par \par She also had a chest CT which revealed a 3 mm solid nodule in her RUL for which no further imaging was indicated as she is a low risk patient.  \par \par SHe also underwent COLOR genetic panel testing which was negative for any mutations. \par \par We have discussed her new diagnosis of breast cancer.  She is currently a stage 1 breast cancer, based off AJCC 8th edition.  We discussed her surgical and other treatment options at this time. \par \par In regards to her left sided breast cancer, she is a great candidate for either breast conservation surgery or a mastectomy.  This lesion is located @3-4:00, 4 cm from the nipple based off the US so a nipple sparing mastectomy could be attempted, as long as the intraop frozen section shows that the retroareolar margin is negative for invasive disease.  There is no difference in survival between a lumpectomy + radiation therapy and a mastectomy.  However the rate of local recurrence is slightly higher with the lumpectomy. The rate of recurrence with a mastectomy is not zero, however, and is likely closer to 4-9%.  \par \par Regardless of if she chooses a mastectomy or a lumpectomy, she will need evaluation of her axillary lymph nodes via a sentinel lymph node biopsy.  This is done by injecting the perioareolar breast tissue with dye prior to the surgery and removing 1-5 lymph nodes that are the first to drain the breast.  If any of these lymph nodes are positive, we would need to discuss the necessity for further axillary surgery at that point. We did discuss the risk of lymphedema with this procedure. \par \par She would like to pursue breast conservation surgery.  Because her left breast lesion is not readily palpable on clinical exam, she will need preoperative localization with an RF localizer for the left breast mass.  \par \par We did briefly discuss the different radiation options.  If she got a mastectomy, she would likely only need radiation therapy if she had a lot of positive margins or if her axillary lymph nodes were positive for metastatic disease.  If she undergoes a lumpectomy, she is a candidate for both partial breast irradiation and whole breast irradiation, pending her Her2 receptor status.  We briefly discussed the differences between these two modalities.\par \par In regards to systemic therapy, we briefly discussed both chemotherapy and endocrine therapy. Her Her2 status was not able to be tested on her core biopsy specimen, so these recommendations could change based off the final pathology.  However, if her lymph nodes are negative, than she may not require any chemotherapy.  If the tumor is larger than 1 cm and her 2 negative, we would likely need to send an additional study on her tumor sample, called an oncotype DX to make the determination regarding if chemotherapy would help her.  At the completion of all these treatments, she should get endocrine therapy, at current time she is premenopausal and would be recommended to get tamoxifen for a total of at least 5 years.  \par \par In regards to reconstruction, if she decides to proceed with a mastectomy,  she has the option for undergoing immediate breast reconstruction.  \par \par Because of her young age at diagnosis, she qualifies for genetic testing.  COLOR genetic testing was performed and was negative for any mutations. \par \par All of her questions were answered.  She knows to call with any further questions or concerns.\par \par Of note, her daughter was present for the entirety of the consultation. \par  phone was used: Kasia #[id number]\par \par PLAN\par -breast MRI -- done showed benign disease in right breast \par -COLOR genetic testing -- negative for any mutations \par -f/up with Dr. Morgan regarding her RUL nodule\par \par OR: LEFT WIDE LOCAL EXCISION WITH RF LOCALIZER, SENTINEL LYMPH NODE BIOPSY, POSSIBLE AXILLARY LYMPH NODE DISSECTION\par \par DIAGNOSIS: LEFT BREAST CANCER \par \par -f/up after her surgery

## 2019-05-23 ENCOUNTER — FORM ENCOUNTER (OUTPATIENT)
Age: 49
End: 2019-05-23

## 2019-05-24 ENCOUNTER — OUTPATIENT (OUTPATIENT)
Dept: OUTPATIENT SERVICES | Facility: HOSPITAL | Age: 49
LOS: 1 days | Discharge: HOME | End: 2019-05-24
Payer: SUBSIDIZED

## 2019-05-24 VITALS
HEART RATE: 66 BPM | RESPIRATION RATE: 16 BRPM | SYSTOLIC BLOOD PRESSURE: 122 MMHG | TEMPERATURE: 98 F | OXYGEN SATURATION: 98 % | HEIGHT: 60.24 IN | DIASTOLIC BLOOD PRESSURE: 78 MMHG | WEIGHT: 169.76 LBS

## 2019-05-24 DIAGNOSIS — C50.412 MALIGNANT NEOPLASM OF UPPER-OUTER QUADRANT OF LEFT FEMALE BREAST: ICD-10-CM

## 2019-05-24 DIAGNOSIS — Z01.818 ENCOUNTER FOR OTHER PREPROCEDURAL EXAMINATION: ICD-10-CM

## 2019-05-24 LAB
ALBUMIN SERPL ELPH-MCNC: 4.4 G/DL — SIGNIFICANT CHANGE UP (ref 3.5–5.2)
ALP SERPL-CCNC: 66 U/L — SIGNIFICANT CHANGE UP (ref 30–115)
ALT FLD-CCNC: 17 U/L — SIGNIFICANT CHANGE UP (ref 0–41)
ANION GAP SERPL CALC-SCNC: 13 MMOL/L — SIGNIFICANT CHANGE UP (ref 7–14)
APPEARANCE UR: CLEAR — SIGNIFICANT CHANGE UP
APTT BLD: 37.6 SEC — SIGNIFICANT CHANGE UP (ref 27–39.2)
AST SERPL-CCNC: 16 U/L — SIGNIFICANT CHANGE UP (ref 0–41)
BASOPHILS # BLD AUTO: 0.04 K/UL — SIGNIFICANT CHANGE UP (ref 0–0.2)
BASOPHILS NFR BLD AUTO: 0.5 % — SIGNIFICANT CHANGE UP (ref 0–1)
BILIRUB SERPL-MCNC: 0.3 MG/DL — SIGNIFICANT CHANGE UP (ref 0.2–1.2)
BILIRUB UR-MCNC: NEGATIVE — SIGNIFICANT CHANGE UP
BUN SERPL-MCNC: 18 MG/DL — SIGNIFICANT CHANGE UP (ref 10–20)
CALCIUM SERPL-MCNC: 9.4 MG/DL — SIGNIFICANT CHANGE UP (ref 8.5–10.1)
CHLORIDE SERPL-SCNC: 107 MMOL/L — SIGNIFICANT CHANGE UP (ref 98–110)
CO2 SERPL-SCNC: 23 MMOL/L — SIGNIFICANT CHANGE UP (ref 17–32)
COLOR SPEC: YELLOW — SIGNIFICANT CHANGE UP
CREAT SERPL-MCNC: 0.8 MG/DL — SIGNIFICANT CHANGE UP (ref 0.7–1.5)
DIFF PNL FLD: ABNORMAL
EOSINOPHIL # BLD AUTO: 0.27 K/UL — SIGNIFICANT CHANGE UP (ref 0–0.7)
EOSINOPHIL NFR BLD AUTO: 3.2 % — SIGNIFICANT CHANGE UP (ref 0–8)
EPI CELLS # UR: ABNORMAL /HPF
GLUCOSE SERPL-MCNC: 95 MG/DL — SIGNIFICANT CHANGE UP (ref 70–99)
GLUCOSE UR QL: NEGATIVE MG/DL — SIGNIFICANT CHANGE UP
HCT VFR BLD CALC: 39.7 % — SIGNIFICANT CHANGE UP (ref 37–47)
HGB BLD-MCNC: 13 G/DL — SIGNIFICANT CHANGE UP (ref 12–16)
IMM GRANULOCYTES NFR BLD AUTO: 0.4 % — HIGH (ref 0.1–0.3)
INR BLD: 0.96 RATIO — SIGNIFICANT CHANGE UP (ref 0.65–1.3)
KETONES UR-MCNC: NEGATIVE — SIGNIFICANT CHANGE UP
LEUKOCYTE ESTERASE UR-ACNC: NEGATIVE — SIGNIFICANT CHANGE UP
LYMPHOCYTES # BLD AUTO: 1.63 K/UL — SIGNIFICANT CHANGE UP (ref 1.2–3.4)
LYMPHOCYTES # BLD AUTO: 19.2 % — LOW (ref 20.5–51.1)
MCHC RBC-ENTMCNC: 27.8 PG — SIGNIFICANT CHANGE UP (ref 27–31)
MCHC RBC-ENTMCNC: 32.7 G/DL — SIGNIFICANT CHANGE UP (ref 32–37)
MCV RBC AUTO: 85 FL — SIGNIFICANT CHANGE UP (ref 81–99)
MONOCYTES # BLD AUTO: 0.56 K/UL — SIGNIFICANT CHANGE UP (ref 0.1–0.6)
MONOCYTES NFR BLD AUTO: 6.6 % — SIGNIFICANT CHANGE UP (ref 1.7–9.3)
NEUTROPHILS # BLD AUTO: 5.95 K/UL — SIGNIFICANT CHANGE UP (ref 1.4–6.5)
NEUTROPHILS NFR BLD AUTO: 70.1 % — SIGNIFICANT CHANGE UP (ref 42.2–75.2)
NITRITE UR-MCNC: NEGATIVE — SIGNIFICANT CHANGE UP
NRBC # BLD: 0 /100 WBCS — SIGNIFICANT CHANGE UP (ref 0–0)
PH UR: 6.5 — SIGNIFICANT CHANGE UP (ref 5–8)
PLATELET # BLD AUTO: 223 K/UL — SIGNIFICANT CHANGE UP (ref 130–400)
POTASSIUM SERPL-MCNC: 4.3 MMOL/L — SIGNIFICANT CHANGE UP (ref 3.5–5)
POTASSIUM SERPL-SCNC: 4.3 MMOL/L — SIGNIFICANT CHANGE UP (ref 3.5–5)
PROT SERPL-MCNC: 7.5 G/DL — SIGNIFICANT CHANGE UP (ref 6–8)
PROT UR-MCNC: NEGATIVE MG/DL — SIGNIFICANT CHANGE UP
PROTHROM AB SERPL-ACNC: 11.1 SEC — SIGNIFICANT CHANGE UP (ref 9.95–12.87)
RBC # BLD: 4.67 M/UL — SIGNIFICANT CHANGE UP (ref 4.2–5.4)
RBC # FLD: 13.2 % — SIGNIFICANT CHANGE UP (ref 11.5–14.5)
RBC CASTS # UR COMP ASSIST: ABNORMAL /HPF
SODIUM SERPL-SCNC: 143 MMOL/L — SIGNIFICANT CHANGE UP (ref 135–146)
SP GR SPEC: <=1.005 — SIGNIFICANT CHANGE UP (ref 1.01–1.03)
UROBILINOGEN FLD QL: 0.2 MG/DL — SIGNIFICANT CHANGE UP (ref 0.2–0.2)
WBC # BLD: 8.48 K/UL — SIGNIFICANT CHANGE UP (ref 4.8–10.8)
WBC # FLD AUTO: 8.48 K/UL — SIGNIFICANT CHANGE UP (ref 4.8–10.8)

## 2019-05-24 PROCEDURE — 93010 ELECTROCARDIOGRAM REPORT: CPT

## 2019-05-24 PROCEDURE — 71046 X-RAY EXAM CHEST 2 VIEWS: CPT | Mod: 26

## 2019-05-24 NOTE — H&P PST ADULT - HISTORY OF PRESENT ILLNESS
47 Y/O FEMALE PRESENTS TO PAST WITH HX BREAST CA, LEFT  PT NOW FOR SCHEDULED PROCEDURE. PT DENIES ANY CP SOB PALP COUGH DYSURIA FEVER URI. PT ABLE TO JAVIER 1-2 FOS W/O SOB

## 2019-05-24 NOTE — H&P PST ADULT - NSANTHOSAYNRD_GEN_A_CORE
No. CHRISTIAN screening performed.  STOP BANG Legend: 0-2 = LOW Risk; 3-4 = INTERMEDIATE Risk; 5-8 = HIGH Risk

## 2019-05-27 ENCOUNTER — FORM ENCOUNTER (OUTPATIENT)
Age: 49
End: 2019-05-27

## 2019-05-28 ENCOUNTER — OUTPATIENT (OUTPATIENT)
Dept: OUTPATIENT SERVICES | Facility: HOSPITAL | Age: 49
LOS: 1 days | Discharge: HOME | End: 2019-05-28
Payer: SUBSIDIZED

## 2019-05-28 PROCEDURE — 19281 PERQ DEVICE BREAST 1ST IMAG: CPT | Mod: LT

## 2019-05-29 ENCOUNTER — APPOINTMENT (OUTPATIENT)
Dept: INTERNAL MEDICINE | Facility: CLINIC | Age: 49
End: 2019-05-29

## 2019-05-29 ENCOUNTER — OUTPATIENT (OUTPATIENT)
Dept: OUTPATIENT SERVICES | Facility: HOSPITAL | Age: 49
LOS: 1 days | Discharge: HOME | End: 2019-05-29

## 2019-05-29 VITALS
SYSTOLIC BLOOD PRESSURE: 159 MMHG | BODY MASS INDEX: 33.57 KG/M2 | TEMPERATURE: 97.1 F | HEIGHT: 60 IN | WEIGHT: 171 LBS | HEART RATE: 147 BPM | DIASTOLIC BLOOD PRESSURE: 100 MMHG

## 2019-05-29 VITALS
BODY MASS INDEX: 33.57 KG/M2 | HEIGHT: 60 IN | HEART RATE: 60 BPM | WEIGHT: 171 LBS | DIASTOLIC BLOOD PRESSURE: 87 MMHG | SYSTOLIC BLOOD PRESSURE: 143 MMHG | TEMPERATURE: 97.6 F

## 2019-05-29 PROBLEM — C50.919 MALIGNANT NEOPLASM OF UNSPECIFIED SITE OF UNSPECIFIED FEMALE BREAST: Chronic | Status: ACTIVE | Noted: 2019-05-24

## 2019-05-29 NOTE — HISTORY OF PRESENT ILLNESS
[FreeTextEntry1] : came for follow up [de-identified] : 48 years old female pt with no known past medical problems, recently diagnosed with left breast cancer (invasive well differentiated ductal carcinoma) , and right benign mass, going for surgery next week.\par currently denies any complaints, she is till menstruating , having regular periods.\par her last CT scan showed 3 mm right upper lobe.

## 2019-05-29 NOTE — ASSESSMENT
[FreeTextEntry1] : 48 years old female pt came for follow up\par \par # pre operative clearance for left breast surgery\par   low- moderate  risk for surgery\par \par \par # left breast cancer\par    invasive left breast cancer ER positive\par    will refer to oncologist\par    follows with breast surgeon\par    benign right breast mass\par \par \par # lung nodule right upper lobe\par    3 mm solid nodule\par    will repeat ct scan in 6 months\par    refer to pulmonologist\par \par # HCM\par    last pap smear october 2018, normal\par \par # follow up in 3 months and prn

## 2019-05-29 NOTE — PHYSICAL EXAM
[Well Nourished] : well nourished [Normal Sclera/Conjunctiva] : normal sclera/conjunctiva [Normal Outer Ear/Nose] : the outer ears and nose were normal in appearance [No JVD] : no jugular venous distention [No Respiratory Distress] : no respiratory distress  [Normal Rate] : normal rate  [No Carotid Bruits] : no carotid bruits [Normal Supraclavicular Nodes] : no supraclavicular lymphadenopathy [No CVA Tenderness] : no CVA  tenderness [No Joint Swelling] : no joint swelling [No Rash] : no rash

## 2019-06-02 ENCOUNTER — FORM ENCOUNTER (OUTPATIENT)
Age: 49
End: 2019-06-02

## 2019-06-03 ENCOUNTER — OUTPATIENT (OUTPATIENT)
Dept: OUTPATIENT SERVICES | Facility: HOSPITAL | Age: 49
LOS: 1 days | Discharge: HOME | End: 2019-06-03
Payer: SUBSIDIZED

## 2019-06-03 ENCOUNTER — APPOINTMENT (OUTPATIENT)
Dept: BREAST CENTER | Facility: AMBULATORY SURGERY CENTER | Age: 49
End: 2019-06-03
Payer: COMMERCIAL

## 2019-06-03 ENCOUNTER — APPOINTMENT (OUTPATIENT)
Dept: BREAST CENTER | Facility: CLINIC | Age: 49
End: 2019-06-03

## 2019-06-03 ENCOUNTER — RESULT REVIEW (OUTPATIENT)
Age: 49
End: 2019-06-03

## 2019-06-03 VITALS
RESPIRATION RATE: 20 BRPM | SYSTOLIC BLOOD PRESSURE: 126 MMHG | HEART RATE: 50 BPM | OXYGEN SATURATION: 99 % | DIASTOLIC BLOOD PRESSURE: 68 MMHG | TEMPERATURE: 98 F

## 2019-06-03 VITALS
OXYGEN SATURATION: 100 % | SYSTOLIC BLOOD PRESSURE: 135 MMHG | HEART RATE: 60 BPM | TEMPERATURE: 98 F | RESPIRATION RATE: 18 BRPM | DIASTOLIC BLOOD PRESSURE: 79 MMHG | WEIGHT: 169.76 LBS

## 2019-06-03 DIAGNOSIS — R92.8 OTHER ABNORMAL AND INCONCLUSIVE FINDINGS ON DIAGNOSTIC IMAGING OF BREAST: ICD-10-CM

## 2019-06-03 PROCEDURE — 88341 IMHCHEM/IMCYTCHM EA ADD ANTB: CPT | Mod: 26

## 2019-06-03 PROCEDURE — 19301 PARTIAL MASTECTOMY: CPT | Mod: LT

## 2019-06-03 PROCEDURE — 88342 IMHCHEM/IMCYTCHM 1ST ANTB: CPT | Mod: 26

## 2019-06-03 PROCEDURE — 88305 TISSUE EXAM BY PATHOLOGIST: CPT | Mod: 26

## 2019-06-03 PROCEDURE — 38792 RA TRACER ID OF SENTINL NODE: CPT | Mod: LT,59

## 2019-06-03 PROCEDURE — 38525 BIOPSY/REMOVAL LYMPH NODES: CPT | Mod: LT

## 2019-06-03 PROCEDURE — 88307 TISSUE EXAM BY PATHOLOGIST: CPT | Mod: 26

## 2019-06-03 PROCEDURE — 38900 IO MAP OF SENT LYMPH NODE: CPT | Mod: LT

## 2019-06-03 PROCEDURE — 78195 LYMPH SYSTEM IMAGING: CPT | Mod: 26

## 2019-06-03 RX ORDER — IBUPROFEN 200 MG
1 TABLET ORAL
Qty: 20 | Refills: 0
Start: 2019-06-03 | End: 2019-06-07

## 2019-06-03 RX ORDER — SODIUM CHLORIDE 9 MG/ML
1000 INJECTION, SOLUTION INTRAVENOUS
Refills: 0 | Status: DISCONTINUED | OUTPATIENT
Start: 2019-06-03 | End: 2019-06-18

## 2019-06-03 RX ORDER — HYDROMORPHONE HYDROCHLORIDE 2 MG/ML
0.5 INJECTION INTRAMUSCULAR; INTRAVENOUS; SUBCUTANEOUS
Refills: 0 | Status: DISCONTINUED | OUTPATIENT
Start: 2019-06-03 | End: 2019-06-03

## 2019-06-03 RX ORDER — OXYCODONE AND ACETAMINOPHEN 5; 325 MG/1; MG/1
1 TABLET ORAL ONCE
Refills: 0 | Status: DISCONTINUED | OUTPATIENT
Start: 2019-06-03 | End: 2019-06-03

## 2019-06-03 RX ORDER — ONDANSETRON 8 MG/1
4 TABLET, FILM COATED ORAL ONCE
Refills: 0 | Status: DISCONTINUED | OUTPATIENT
Start: 2019-06-03 | End: 2019-06-18

## 2019-06-03 RX ADMIN — SODIUM CHLORIDE 100 MILLILITER(S): 9 INJECTION, SOLUTION INTRAVENOUS at 11:28

## 2019-06-03 NOTE — CHART NOTE - NSCHARTNOTEFT_GEN_A_CORE
PACU ANESTHESIA ADMISSION NOTE      ____ Intubated  TV:______       Rate: ______      FiO2: ______    __x__ Patent Airway    __x__ Full return of protective reflexes    ____ Full recovery from anesthesia / sedation to baseline status    Vitals:  HR 59  /58  RR 15  O2sat. 97%  Temp: 36.3 C      Mental Status:  _x___ Awake   _____ Alert   ____x_ Drowsy   _____ Sedated    Nausea/Vomiting: ____ Yes, See Post - Op Orders      __x__ No    Pain Scale (0-10): ___x__    Treatment: ____ None    __x__ See Post - Op/PCA Orders    Post - Operative Fluids:   ____ Oral   __x__ See Post - Op Orders    Plan:  Discharge to:   __x__Home       _____Floor      _____Critical Care    _____ Other:_________________    Comments: s/p general anesthesia with LMA. No anesthesia complications. Pt's condition is stable in PACU. Full report is given to PACU RN.

## 2019-06-03 NOTE — BRIEF OPERATIVE NOTE - OPERATION/FINDINGS
L breast mass excised using RF localizer. Using separate ax incision, single LN excised using technetium and blue dye. Incisions closed w/ 3-0 vicryl and 4-0 monocryl.

## 2019-06-03 NOTE — ASU DISCHARGE PLAN (ADULT/PEDIATRIC) - CARE PROVIDER_API CALL
Gerri Tiwari (MD)  Surgery  [address] [facility], [address] [place] [address]  Phone: [phone]  Fax: [phone]  Follow Up Time:

## 2019-06-07 LAB — SURGICAL PATHOLOGY STUDY: SIGNIFICANT CHANGE UP

## 2019-06-11 DIAGNOSIS — C50.912 MALIGNANT NEOPLASM OF UNSPECIFIED SITE OF LEFT FEMALE BREAST: ICD-10-CM

## 2019-06-14 ENCOUNTER — APPOINTMENT (OUTPATIENT)
Dept: BREAST CENTER | Facility: CLINIC | Age: 49
End: 2019-06-14
Payer: COMMERCIAL

## 2019-06-14 VITALS
DIASTOLIC BLOOD PRESSURE: 86 MMHG | TEMPERATURE: 98.7 F | SYSTOLIC BLOOD PRESSURE: 130 MMHG | BODY MASS INDEX: 33.57 KG/M2 | WEIGHT: 171 LBS | HEIGHT: 60 IN

## 2019-06-14 PROCEDURE — 99024 POSTOP FOLLOW-UP VISIT: CPT

## 2019-06-17 NOTE — PAST MEDICAL HISTORY
[Menstruating] : The patient is menstruating [Menarche Age ____] : age at menarche was [unfilled] [Live Births ___] : P[unfilled]  [Total Preg ___] : G[unfilled] [Age At Live Birth ___] : Age at live birth: [unfilled] [FreeTextEntry5] : denies  [History of Hormone Replacement Treatment] : has no history of hormone replacement treatment [FreeTextEntry6] : denies [FreeTextEntry7] : denies  [FreeTextEntry8] : yes in past

## 2019-06-17 NOTE — REVIEW OF SYSTEMS
[As Noted in HPI] : as noted in HPI [Negative] : Heme/Lymph [Fever] : no fever [Skin Lesions] : no skin lesions [Chills] : no chills [Skin Wound] : skin wound [Breast Pain] : no breast pain [Breast Lump] : no breast lump

## 2019-06-17 NOTE — DATA REVIEWED
[FreeTextEntry1] : [place] Accession Number : [id number]\par \par REYESCARINO, EDITH                    4\par \par \par \par Surgical Final Report\par \par \par \par \par Final Diagnosis\par 1. 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),\par cribriform and papillary types associated with calcifications,\par low nuclear grade.\par - The invasive carcinoma extends to broadly involve (touches both\par ink and cautery) the inferior surgical margin and the intraductal\par carcinoma extends to focally involve (touches ink) the medial\par surgical margin.\par - No lymphovascular or perineural invasion is seen.\par - Surrounding breast tissue with 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 Breast, left axillary sentinel lymph node #1, biopsy:\par - Five benign lymph nodes (0/5). Negative for carcinoma with\par evaluation of mutiple H T E levels and with negative\par immunohistochemical stains for cytokeratins (CK AE1/AE3 and CK\par 8/18).\par _________________________________________________________________\par __________\par \par 1) Immunohistochemical studies were performed at [facility], [address] [place] [address], [facility] (see NOTE).  The results are as follows:\par \par % POSITIVE     STAINING INTENSITY\par \par ESTROGEN RECEPTOR    80   MODERATE to STRONG (2+ to 3+)\par PROGESTERONE RECEPTOR     80   MODERATE to STRONG (2+ to 3+)\par Ki-67                3-5\par \par Comment: No current consensus exists as to the methodological\par evaluation, optimal cutoff and algorithm of clinical usage of the\par Ki-67 labeling proliferative index. Proposed cutoffs noted in the\par literature vary from:\par \par \par \par \par \par REYESCARINO, EDITH                    4\par \par \par \par Surgical Final Report\par \par \par \par \par Unfavorable     >10% or >14% or >20% or 30% ([place]).\par Intermediate    10-14%, 10-20%, 16-30% ([place]).\par Favorable       <10% or <15% ([place]).\par \par A positive test result is defined as positive nuclear staining in\par >1% of tumor cells.                    A negative test result is\par defined as nuclear staining in <1% of tumor cells.\par Type of specimen fixation: 10% buffered formalin\par \par Time to fixation: <1 HR\par Time in formalin: >6 HRS, <48 HRS\par \par Detection system used: Ultra View Universal DAB detection kit.\par Antibody clone used:\par Anti-ER Rabbit Monoclonal, [place], clone SP1.\par Anti-MO Rabbit Monoclonal, [place], clone IE2.\par \par Appropriate positive and negative controls were used and\par evaluated in conjunction with this study.\par \par NOTE: All controls show appropriate reactivity.\par \par This test was developed and its performance characteristics\par determined by [facility].  It has not been\par cleared or approved by the U.[place].  Food and Drug Administration.\par The FDA does not require this test to go through premarket FDA\par review.  This test is used for clinical purposes.  It should not\par be regarded as investigational or for research. This assay has\par not been validated for decalcified tissues.  Results should be\par interpreted with caution given the likelihood of false negativity\par on decalcified specimens.  This laboratory is regulated under the\par Clinical Laboratory Improvement Amendments of 1988 (CLIA) as\par qualified to perform high complexity clinical laboratory testing.\par \par The interpretation of this test\par was performed at [facility], [address] [place] [address], [facility].\par \par Verified by: Ray Butler M.D.\par (Electronic Signature)\par Reported on: 06/07/19 13:21 EDT, [address] [place] [address],[facility] [id number]\par _________________________________________________________________\par \par Clinical History\par Left breast wide local excision with radio frequency localizer\par sentinel lymph node biopsy\par \par \par \par \par \par \par REYESCARINO, EDITH                    4\par \par \par \par Surgical Final Report\par \par \par \par \par Specimen(s) Submitted\par 1     Left breast mass\par 2     Left breast sentinel lymph node\par \par Gross Description\par 1. The specimen is received fresh, labeled "left breast mass\par radio frequency localizer, single anterior, double lateral,\par triple superior" and consists of a previously inked oriented\par lumpectomy specimen, weighing 11 gm and measuring 5 x 4 x 3.5 cm.\par The specimen is oriented by a single stitch marking the anterior\par surface, double stitch marking the lateral surface and a triple\par stitch marking the superior surface. Serial sectioning reveals a\par variegated white to yellow firm to soft surface. The specimen is\par submitted entirely.\par \par Summary of Sections:\par 1A - One cross section -1\par 1B - One cross section -1\par 1C - One cross section -1\par 1D-1E - One cross section -2\par 1F - One cross section -1\par 1G-1H - One cross section -2\par 1I-1J - One cross section -2\par 1K-1L - One cross section -2\par 1M-1N - One cross section -2\par 1O - One cross section -1\par 1P - One cross section -1\par \par Total blocks - 16\par \par 2. The specimen is received fresh and placed in formalin, labeled\par "left breast sentinel node #1, count [id number]" and consists of a\par yellow lobulated firm tissue measuring 3 x 2 x 1.5 cm. Five lymph\par nodes are identified. The largest lymph node measures 2 x 1.5 x\par 0.3 cm. The specimen is submitted entirely.\par \par Summary of Sections:\par \par 2A - largest lymph node serially sectioned -1\par 2B - two lymph nodes bisected, one lymph node inked black -1\par 2C - two lymph nodes bisected, one lymph node inked black -1\par \par Total: 3 blocks\par \par Specimen was received and underwent gross examination at [facility], [address] [place] [address],\par [place] [address].\par \par \par \par \par \par \par REYESCARINO, EDITH                    4\par \par \par \par Surgical Final Report\par \par \par \par \par 06/04/19 08:57 aa\par \par Perioperative Diagnosis\par Left breast cancer\par

## 2019-06-17 NOTE — ASSESSMENT
[FreeTextEntry1] : Frances is a 48 premenopausal F with an US detected L breast cancer, [id number], ER/CO positive, her 2 neg stage 1 breast cancer, s/p L WLE + SLN Bx on 6/3/19. stage 1 A breast cancer. \par \par -s/p  L WLE and SLN Bx on 6/3/19\par --final pathology revealed positive inferior and medial margins\par \par She is healing well from her surgery.  There was no signs of infection, hematoma or significant seroma formation on exam.   \par \par Because her inferior and medial margins were positive on surgical excision, however I have discussed the need for a re-excision of both the medial and inferior margin.  I have given her the option of a mastectomy rather than a re-excision however, Frances is agreeable to a LEFT BREAST MASS RE-EXCISION (INFERIOR AND MEDIAL MARGINS). This will be scheduled for her today. \par \par Since her last visit, she underwent a breast MRI which revealed nonmass enhancement spanning 6.7 cm in her inferior RIGHT breast which was biopsy proven benign proliferative type fibrocystic changes.  No additional abnormalities were noted in her left breast.  \par \par She also had a chest CT which revealed a 3 mm solid nodule in her RUL for which no further imaging was indicated as she is a low risk patient.  \par \par SHe also underwent COLOR genetic panel testing which was negative for any mutations. \par \par We have discussed her new diagnosis of breast cancer.  She is currently a stage 1 breast cancer, based off AJCC 8th edition.  We discussed her surgical and other treatment options at this time. \par \par We did briefly discuss the different radiation options. She is a candidate for both partial breast irradiation and whole breast irradiation.  She will be referred to radiation oncology after her surgical margins have been cleared. \par \par In regards to systemic therapy, we briefly discussed both chemotherapy and endocrine therapy. Her Her2 status was negative.  Although her tumor was <1 cm, I would like to send an oncotype DX to make the determination regarding if chemotherapy would help her.  The final decision regarding chemotherapy will be deferred to medical oncology.  A referral will be made for her.  \par \par At the completion of all these treatments, she should get endocrine therapy, at current time she is premenopausal and would be recommended to get tamoxifen for a total of at least 5 years.  \par \par Because of her young age at diagnosis, she qualifies for genetic testing.  COLOR genetic testing was performed and was negative for any mutations. \par \par All of her questions were answered.  She knows to call with any further questions or concerns.\par \par PLAN\par -f/up with Dr. Morgan regarding her RUL nodule\par \par OR: LEFT breast mass re-excision of inferior and medial margins\par DIAGNOSIS: LEFT BREAST CANCER \par \par -f/up after her surgery

## 2019-06-17 NOTE — PHYSICAL EXAM
[No Rashes] : no rashes [No Ulceration] : no ulceration [Normocephalic] : normocephalic [Atraumatic] : atraumatic [No Supraclavicular Adenopathy] : no supraclavicular adenopathy [EOMI] : extra ocular movement intact [No Cervical Adenopathy] : no cervical adenopathy [de-identified] : two surgical incisions are healing well without any surrounding erythema, induration or drainage, dermabond in place x 2

## 2019-06-17 NOTE — HISTORY OF PRESENT ILLNESS
[FreeTextEntry1] : Frances is a 48 premenopausal F who presents with a US detected L breast cancer, [id number], ER/LA positive, her 2 neg stage 1 breast cancer, s/p L WLE + SLN Bx on 6/3/19.\par \par Her work up was as follows: \par 3/13/19 -- L dx tomosynthesis; b/l US \par RIGHT \par -@1N2, mass measuring 1.2 x 1.4 x 0.5 cm, no change, deemed BIRADS 3\par -@6N4, mass measuring 0.6 x 0.9 x 0.4 cm, no change, deemed BIRADS 3\par LEFT: \par -@12N2, cyst measuring 1 x 1 x 0.8 cm \par -@5N5, mass measuring 1.2 x 1.4 x 0.5 cm, no change, deemed BIRADS 3\par -@3-4N4, il defined mass, measuring 0.5 x 0.7 x 0.6 cm, mammographically occult, BIOPSY \par BIRADS 4 of above lesion\par \par 3/18/19 -- L US CNBx @3-4N4\par -micropscopic IDC, well differentiated\par -ER/LA positive, Her 2 pending (Abel )\par \par 19 -- breast MRI \par R: 6.7 cm nonmass enhancement in inferior R breast --> rec BIOPSY of anterior and posterior aspect \par L: lateral mid depth, irregular enhancing mass measuring 0.9 cm consistent with known cancer \par -b/l breast cysts \par -abnormal signal in anterior right lung base \par \par 19 -- CT Chest \par -3 mm solid nodule in RUL \par -rec: no further imaging if patient is low risk \par \par 19 -- R MRI guided biopsy x 2 \par -benign proliferative type FC changes x 2\par \par COLOR genetic testing \par -negative for any mutations \par \par 6/3/19 -- L WLE with SLN Bx\par -IDC, well differentiated, focal lobular features, SBR 1\par -T=9mm\par -EIC, low nuclear grade\par -invasive cancer extends to broadly involve inferior and medial margins\par -cLCIS, radial scar \par -0/5 SLN + for metastatic disease \par -ER/LA pos, Her 2 neg on IHC\par \par HISTORICAL RISK FACTORS: \par -1 prior breast biopsy as above \par -no family history of breast or ovarian cancer \par - , age at first live birth was 24\par -no prior OCP use \par -COLOR genetic testing negative\par \par INTERVAL HISTORY: \par Frances is a 48 premenopausal F with left breast cancer.  She presents today for her FIRST post operative visit, s/p L WLE with SLN Bx on 6/3/19.  \par \par She has been doing well since her surgery.  She does not have any pain at her surgical site, denies any redness or drainage from the incision and denies any fevers or chills.  \par \par Her final pathology revealed a 9mm IDC, well differentiated, SBR 1, extensive DCIS that was low nuclear grade, however the medial and inferior margin were positive.  She had 5 LN removed which were all negative, and the tumor was ER/LA positive, Her 2 negative on IHC.

## 2019-06-23 ENCOUNTER — FORM ENCOUNTER (OUTPATIENT)
Age: 49
End: 2019-06-23

## 2019-06-24 ENCOUNTER — OUTPATIENT (OUTPATIENT)
Dept: OUTPATIENT SERVICES | Facility: HOSPITAL | Age: 49
LOS: 1 days | Discharge: HOME | End: 2019-06-24
Payer: SUBSIDIZED

## 2019-06-24 VITALS
TEMPERATURE: 99 F | HEIGHT: 60 IN | DIASTOLIC BLOOD PRESSURE: 86 MMHG | SYSTOLIC BLOOD PRESSURE: 130 MMHG | WEIGHT: 171.08 LBS

## 2019-06-24 DIAGNOSIS — R91.1 SOLITARY PULMONARY NODULE: ICD-10-CM

## 2019-06-24 PROCEDURE — 71260 CT THORAX DX C+: CPT | Mod: 26

## 2019-06-24 NOTE — H&P PST ADULT - HISTORY OF PRESENT ILLNESS
Patient with Left breast well differentiated invasive ductal carcinoma on ultrasound guided core biopsy 3/18/19; 3-4:00 N4, 6 mm, status post lumpectomy and SLNB 6/3/19 demonstrating 9 mm well differentiated invasive ductal carcinoma with foal lobular features, low nuclear grade DCIS, classical LCIS, radial scar, with inferior and medial margins positive for cancer.  She will present for re-excision of the inferior and medial margins.

## 2019-07-05 ENCOUNTER — APPOINTMENT (OUTPATIENT)
Dept: HEMATOLOGY ONCOLOGY | Facility: CLINIC | Age: 49
End: 2019-07-05
Payer: COMMERCIAL

## 2019-07-05 VITALS
HEIGHT: 59 IN | BODY MASS INDEX: 34.88 KG/M2 | DIASTOLIC BLOOD PRESSURE: 61 MMHG | TEMPERATURE: 97.5 F | HEART RATE: 58 BPM | SYSTOLIC BLOOD PRESSURE: 129 MMHG | WEIGHT: 173 LBS

## 2019-07-05 PROCEDURE — 99205 OFFICE O/P NEW HI 60 MIN: CPT

## 2019-07-06 NOTE — HISTORY OF PRESENT ILLNESS
[de-identified] : 48 yr old female is referred by Dr. Tiwari for consultation of adjuvant systemic therapy for newly diagnosed right breast cancer. The patient felt a palpable lump in her left breast  and underwent diagnostic workup.  \par \par Bilateral dx mammo and US on 3/13/19 revealed right breast mass  - @1N2, mass measuring 1.2 x 1.4 x 0.5 cm, no change, deemed BIRADS 3 -@6N4, mass measuring 0.6 x 0.9 x 0.4 cm, no change, deemed BIRADS 3. LEFT breast @12N2, cyst measuring 1 x 1 x 0.8 cm - @5N5, mass measuring 1.2 x 1.4 x 0.5 cm, no change, deemed BIRADS 3 -@3-4N4, ill defined mass, measuring 0.5 x 0.7 x 0.6 cm, mammographically occult, BIOPSY recommended for BIRADS 4 of above lesion\par \par On 3/18/19, she underwent a left US CNBx @3-4N4 which revealed microscopic IDC, well differentiated ER/SD positive, Her 2 negative\par \par On 19, b/l breast MRI showed Right breast 6.7 cm nonmass enhancement in inferior R breast --> rec BIOPSY of anterior and posterior aspect, and left breast lateral mid depth, irregular enhancing mass measuring 0.9 cm consistent with known cancer \par \par 19 -- CT Chest: 3 mm solid nodule in RUL \par \par 19 -- R MRI guided biopsy x 2 \par -benign proliferative type FC changes x 2\par \par The patient had COLOR genetic testing. She is negative for any mutations.\par \par On 6/3/19, she underwent left WLE with SLN Bx. The pathology showed 9 mm IDC, well differentiated, focal lobular features, low nuclear grade, invasive cancer extends to broadly involve inferior and medial margins. There was cLCIS, radial scar. 5 SLNs were negative for malignancy.  The invasive tumor was ER/SD pos 80%, Her 2 neg on IHC, Ki 67 3-5%.\par \par The surgical specimen was sent for Oncotype dx analysis. her RS is 7, in the low risk range and predicting 3% risk of distant recurrence with endocrine therapy alone.\par \par The patient is previously healthy, no PMH, no family history of breast cancer or other cancers. She is , age at first live birth was 24. No prior OCP use.\par She denies smoking or drinking. She is up to date on pap smear and colonoscopy . Her LMP was on 2019. \par \par She is here to discuss systemic adjuvant therapy. She is able to speak English and understands conversation well. We tried to use  service but there was connection problem.\par \par \par \par \par \par \par \par

## 2019-07-06 NOTE — CONSULT LETTER
[Dear  ___] : Dear  [unfilled], [Consult Closing:] : Thank you very much for allowing me to participate in the care of this patient.  If you have any questions, please do not hesitate to contact me. [Please see my note below.] : Please see my note below. [Consult Letter:] : I had the pleasure of evaluating your patient, [unfilled]. [Sincerely,] : Sincerely, [FreeTextEntry3] : Chung Lozada MD [DrGumaro  ___] : Dr. BREWSTER

## 2019-07-06 NOTE — PHYSICAL EXAM
[Fully active, able to carry on all pre-disease performance without restriction] : Status 0 - Fully active, able to carry on all pre-disease performance without restriction [Normal Male] : prostate smooth, symmetric with no modularity or induration [Normal] : normal appearance, no rash, nodules, vesicles, ulcers, erythema [de-identified] : Status post left lumpectomy and SLNB. The surgical scars are healing well. There is no palpable abnormality.

## 2019-07-06 NOTE — ASSESSMENT
[FreeTextEntry1] : 47 yo premenopausal female has IDC of the left breast, stage IA ( [id number]), ER/IA positive, Her 2 neg, G1, s/p lumpectomy and SLNB.\par RS 7, in the low risk range.\par \par Recommendation:\par We had a detailed discussion regarding to her diagnosis, staging, prognosis and adjuvant systemic therapy. We reviewed pathology and Oncotype RS reports. Frances has hormone receptor positive and Her-2 negative early stage breast cancer with favorable pathologic features(small size, G1, negative SLN, -LVI, Her-2 neg). Her Oncotype RS is 7 which is in the low risk range and predicts 3% risk of distant recurrent with endocrine therapy alone. Based on the data from TAILORx trial, she does not benefit from adjuvant chemotherapy.\par \par She is recommended to take adjuvant endocrine therapy with Tamoxifen. We reviewed benefit and side effects.  The potential side effects associate with FINNEGAN may include but not limit to a small increased risk of cardiovascular events, blood clots and endometrial cancer, hot flashes, vasomotor symptoms, depression and cataract. She may switch to an aromatase inhibitor after she becomes menopausal. Frances is able to comprehend information. She had the opportunities to ask questions and all questions were answered. She opted to take tamoxifen. A prescription was sent to her pharmacy. She is advised to start Tamoxifen after she completes adjuvant breast radiotherapy.\par \par She will followup with Dr. Tiwari for margin reexcision on 7/11/19. She will see a radiation oncologist for consultation of adjuvant breast radiotherapy. \par \par She will have b/l surveillance mammogram every 6 months.\par \par Regarding to 3 mm solid nodule in RUL, will order a follow up with CT chest in 3 months.\par \par Case discussed with Dr Lozada who agreed with the above plan\par \par \par \par \par \par

## 2019-07-08 ENCOUNTER — OUTPATIENT (OUTPATIENT)
Dept: OUTPATIENT SERVICES | Facility: HOSPITAL | Age: 49
LOS: 1 days | Discharge: HOME | End: 2019-07-08
Payer: SUBSIDIZED

## 2019-07-08 ENCOUNTER — RESULT REVIEW (OUTPATIENT)
Age: 49
End: 2019-07-08

## 2019-07-08 ENCOUNTER — APPOINTMENT (OUTPATIENT)
Dept: BREAST CENTER | Facility: CLINIC | Age: 49
End: 2019-07-08
Payer: COMMERCIAL

## 2019-07-08 VITALS
DIASTOLIC BLOOD PRESSURE: 64 MMHG | OXYGEN SATURATION: 100 % | TEMPERATURE: 98 F | RESPIRATION RATE: 12 BRPM | HEART RATE: 58 BPM | SYSTOLIC BLOOD PRESSURE: 118 MMHG

## 2019-07-08 DIAGNOSIS — Z98.890 OTHER SPECIFIED POSTPROCEDURAL STATES: Chronic | ICD-10-CM

## 2019-07-08 PROCEDURE — 88305 TISSUE EXAM BY PATHOLOGIST: CPT | Mod: 26

## 2019-07-08 PROCEDURE — 88342 IMHCHEM/IMCYTCHM 1ST ANTB: CPT | Mod: 26

## 2019-07-08 PROCEDURE — 19301 PARTIAL MASTECTOMY: CPT | Mod: LT,58

## 2019-07-08 PROCEDURE — 88341 IMHCHEM/IMCYTCHM EA ADD ANTB: CPT | Mod: 26

## 2019-07-08 RX ORDER — SODIUM CHLORIDE 9 MG/ML
1000 INJECTION, SOLUTION INTRAVENOUS
Refills: 0 | Status: DISCONTINUED | OUTPATIENT
Start: 2019-07-08 | End: 2019-07-23

## 2019-07-08 RX ORDER — HYDROMORPHONE HYDROCHLORIDE 2 MG/ML
1 INJECTION INTRAMUSCULAR; INTRAVENOUS; SUBCUTANEOUS
Refills: 0 | Status: DISCONTINUED | OUTPATIENT
Start: 2019-07-08 | End: 2019-07-08

## 2019-07-08 RX ORDER — HYDROMORPHONE HYDROCHLORIDE 2 MG/ML
0.5 INJECTION INTRAMUSCULAR; INTRAVENOUS; SUBCUTANEOUS
Refills: 0 | Status: DISCONTINUED | OUTPATIENT
Start: 2019-07-08 | End: 2019-07-08

## 2019-07-08 RX ORDER — OXYCODONE AND ACETAMINOPHEN 5; 325 MG/1; MG/1
2 TABLET ORAL ONCE
Refills: 0 | Status: DISCONTINUED | OUTPATIENT
Start: 2019-07-08 | End: 2019-07-08

## 2019-07-08 RX ORDER — ONDANSETRON 8 MG/1
4 TABLET, FILM COATED ORAL ONCE
Refills: 0 | Status: DISCONTINUED | OUTPATIENT
Start: 2019-07-08 | End: 2019-07-23

## 2019-07-08 RX ADMIN — SODIUM CHLORIDE 100 MILLILITER(S): 9 INJECTION, SOLUTION INTRAVENOUS at 08:42

## 2019-07-08 NOTE — BRIEF OPERATIVE NOTE - NSICDXBRIEFPROCEDURE_GEN_ALL_CORE_FT
PROCEDURES:  Left breast lumpectomy 08-Jul-2019 08:40:48 Re-excision of surgical margins of previous lumpectomy Hugh Shah

## 2019-07-08 NOTE — ASU DISCHARGE PLAN (ADULT/PEDIATRIC) - CALL YOUR DOCTOR IF YOU HAVE ANY OF THE FOLLOWING:
Pain not relieved by Medications/Numbness, tingling, color or temperature change to extremity/Swelling that gets worse/Bleeding that does not stop

## 2019-07-12 LAB — SURGICAL PATHOLOGY STUDY: SIGNIFICANT CHANGE UP

## 2019-07-17 ENCOUNTER — APPOINTMENT (OUTPATIENT)
Dept: BREAST CENTER | Facility: CLINIC | Age: 49
End: 2019-07-17
Payer: COMMERCIAL

## 2019-07-17 PROCEDURE — 99024 POSTOP FOLLOW-UP VISIT: CPT

## 2019-07-18 DIAGNOSIS — C50.912 MALIGNANT NEOPLASM OF UNSPECIFIED SITE OF LEFT FEMALE BREAST: ICD-10-CM

## 2019-07-18 DIAGNOSIS — C50.412 MALIGNANT NEOPLASM OF UPPER-OUTER QUADRANT OF LEFT FEMALE BREAST: ICD-10-CM

## 2019-07-18 NOTE — HISTORY OF PRESENT ILLNESS
[FreeTextEntry1] : Frances is a 48 premenopausal F who presents with a US detected L breast cancer, [id number], ER/CA positive, her 2 neg stage 1 breast cancer, s/p L WLE + SLN Bx on 6/3/19, s/p L breast re-excision on 19.\par \par Her work up was as follows: \par 3/13/19 -- L dx tomosynthesis; b/l US \par RIGHT \par -@1N2, mass measuring 1.2 x 1.4 x 0.5 cm, no change, deemed BIRADS 3\par -@6N4, mass measuring 0.6 x 0.9 x 0.4 cm, no change, deemed BIRADS 3\par LEFT: \par -@12N2, cyst measuring 1 x 1 x 0.8 cm \par -@5N5, mass measuring 1.2 x 1.4 x 0.5 cm, no change, deemed BIRADS 3\par -@3-4N4, il defined mass, measuring 0.5 x 0.7 x 0.6 cm, mammographically occult, BIOPSY \par BIRADS 4 of above lesion\par \par 3/18/19 -- L US CNBx @3-4N4\par -micropscopic IDC, well differentiated\par -ER/CA positive, Her 2 pending (Abel )\par \par 19 -- breast MRI \par R: 6.7 cm nonmass enhancement in inferior R breast --> rec BIOPSY of anterior and posterior aspect \par L: lateral mid depth, irregular enhancing mass measuring 0.9 cm consistent with known cancer \par -b/l breast cysts \par -abnormal signal in anterior right lung base \par \par 19 -- CT Chest \par -3 mm solid nodule in RUL \par -rec: no further imaging if patient is low risk \par \par 19 -- R MRI guided biopsy x 2 \par -benign proliferative type FC changes x 2\par \par COLOR genetic testing \par -negative for any mutations \par \par 6/3/19 -- L WLE with SLN Bx\par -IDC, well differentiated, focal lobular features, SBR 1\par -T=9mm\par -EIC, low nuclear grade\par -invasive cancer extends to broadly involve inferior and medial margins\par -cLCIS, radial scar \par -0/5 SLN + for metastatic disease \par -ER/CA pos, Her 2 neg on IHC\par -ONCOTYPE score of 7\par \par 19 -- L breast mass re-excision \par -residual foci of IDC, 2mm, located 0.5 mm from new superior/anterior margin\par -extensive DCIS, low nuclear grade, 1 mm from medial/anterior margin and 1.5 mm from lateral margin \par \par HISTORICAL RISK FACTORS: \par -1 prior breast biopsy as above \par -no family history of breast or ovarian cancer \par - , age at first live birth was 24\par -no prior OCP use \par -COLOR genetic testing negative\par \par INTERVAL HISTORY: \par Frances is a 48 premenopausal F with left breast cancer.  She presents today for her FIRST post operative visit, s/p L WLE with SLN Bx on 6/3/19, s/p L breast mass re-excision on 19.  \par \par She has been doing well since her surgery.  She does not have any pain at her surgical site, denies any redness or drainage from the incision and denies any fevers or chills.  \par \par Her final pathology from her re-excision revealed an additional 2mm of IDC, located 0.5 mm from her new superior/anterior margin and extensive DCIS, located 1 mm from her medial/anterior margin, and 1.5 mm from her lateral margin. \par \par She has met with Dr. Lozada and was given a prescription for tamoxifen and chemotherapy was not offered.\par She has not met with radiation oncology yet.  \par \par As review: \par Her final pathology revealed a 9mm IDC, well differentiated, SBR 1, extensive DCIS that was low nuclear grade, however the medial and inferior margin were positive.  She had 5 LN removed which were all negative, and the tumor was ER/CA positive, Her 2 negative on IHC, oncotype RS 7.

## 2019-07-18 NOTE — PHYSICAL EXAM
[Normocephalic] : normocephalic [Atraumatic] : atraumatic [EOMI] : extra ocular movement intact [No Supraclavicular Adenopathy] : no supraclavicular adenopathy [No Cervical Adenopathy] : no cervical adenopathy [No Rashes] : no rashes [No Ulceration] : no ulceration [de-identified] : two surgical incisions are healing well without any surrounding erythema, induration or drainage, dermabond in place at her breast incision; she likely has a seroma in her lumpectomy cavity

## 2019-07-18 NOTE — DATA REVIEWED
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), cribriform and\par papillary types, low nuclear grade, located 1.0 mm from both the\par new inked medial and anterior surgical margins, and 1.5 mm from\par the inked new lateral surgical margin (microscopic measurements).\par - Proliferative type fibrocystic changes including focal florid\par duct hyperplasia, stromal fibrosis with pseudoangiomatous stromal\par hyperplasia (PASH), sclerosing and blunt duct adenosis, columnar\par cell change/hyperplasia, apocrine metaplasia-lined cysts, and\par microcalcifications.\par \par Comment: The diagnosis is supported by negative\par immunohistochemical stains for Calponin-1, p63, and SMM which\par demonstrates an absence of myoepithelial cells in the focus of\par residual invasive carcinoma.\par \par Note:  All controls show appropriate reactivity.\par \par This test was developed and its performance characteristics\par determined by [facility].  It has not been\par cleared or approved by the U.S.  Food and Drug Administration.\par The FDA does not require this test to go through premarket FDA\par review. This test is used for clinical purposes.  It should not\par be regarded as investigational or for research. This assay has\par not been validated for decalcified tissues.  Results should be\par interpreted with caution given the likelihood of false negativity\par on decalcified specimens.  This laboratory is regulated under the\par Clinical Laboratory Improvement Amendments of 1988 (CLIA) as\par qualified to perform high complexity clinical laboratory testing.\par \par The interpretation of this test was performed at [facility], [address] [place] [address], [facility] [place][note 2].\par \par Verified by: Ray Butler M.D.\par (Electronic Signature)\par Reported on: 07/12/19 15:47 EDT, [address] [place] [address],[facility] [id number]\par _________________________________________________________________\par \par Clinical History\par Left breast mass re-excision\par Positive inferior and medial margin\par \par \par \par \par \par REYESCARINO, HUMBERTO                    2\par \par \par \par Surgical Final Report\par \par \par \par \par \par Specimen(s) Submitted\par Left breast mass lumpectomy\par \par Gross Description\par The specimen is received in formalin, labeled "left breast mass,\par single stitch-anterior, double-lateral, triple-superior" and\par consists of an oriented inked lumpectomy specimen weighing 32\par grams and measuring 6 x 5.5 x 2.5 cm. The specimen is inked as\par follows: anterior-red, lateral-yellow, inferior-green, superior-\par blue, medial-orange and posterior-black. Serial sections reveal a\par tan firm surface with few white firm and soft red hemorrhagic\par areas. The specimen is submitted entirely.\par \par Summary of Sections:\par \par 1A-1B - one cross section each -2\par 1C-1D - one cross section each -2\par 1E-1F - one cross section -2\par 1G-1H -one cross section -2\par 1I-1J - one cross section -2\par 1K-1N - one cross section -3\par 1O-1P - one cross section -2\par 1Q-1R - one cross section -2\par 1S-1U - one cross section -3\par 1V - one cross section -1\par 1W - one cross section -1\par \par Total: 23 blocks\par \par Specimen was received and underwent gross examination at [facility], [address] [place] [address],\[facility] [id number].\par \par \par 07/09/19 09:19 aa\par \par Perioperative Diagnosis\par Left breast mass re-excision\par \par Postoperative Diagnosis\par Left breast cancer\par

## 2019-07-18 NOTE — ASSESSMENT
[FreeTextEntry1] : Frances is a 48 premenopausal F with an US detected L breast cancer, [id number], ER/AK positive, her 2 neg stage 1 breast cancer, s/p L WLE + SLN Bx on 6/3/19, s/p L breast mass re-excision on 7/8/19 with negative margins. stage 1 A breast cancer. \par \par -s/p  L WLE and SLN Bx on 6/3/19\par --final pathology revealed positive inferior and medial margins\par \par s/p L breast re-excision on 7/8/19\par -final pathology revealed negative surgical margins\par \par ONCOTYPE RS 7\par -no chemotherapy \par \par She is healing well from her surgery.  There was no signs of infection, hematoma or significant seroma formation on exam.   I will have her follow up in 3 months for a CBE. She will be due for her screening mammogram 9/12/19, however given the proximity to her surgery and radiation therapy, we will delay her screening mammogram for several months.  This will be scheduled at her next visit in three months. \par \par Her final surgical margins were negative, although she did have widespread DCIS which had close margins, but not positive.  There is no indication for further surgical excision at this time. \par \par AS REVIEW: \par She underwent a breast MRI which revealed nonmass enhancement spanning 6.7 cm in her inferior RIGHT breast which was biopsy proven benign proliferative type fibrocystic changes.  No additional abnormalities were noted in her left breast.  We will repeat the MRI in 1 year \par \par She also had a chest CT which revealed a 3 mm solid nodule in her RUL for which no further imaging was indicated as she is a low risk patient.  \par \par SHe also underwent COLOR genetic panel testing which was negative for any mutations. \par \par We have discussed her new diagnosis of breast cancer.  She is currently a stage 1 breast cancer, based off AJCC 8th edition.  We discussed her surgical and other treatment options at this time. \par \par We did briefly discuss the different radiation options. She is a candidate for both partial breast irradiation and whole breast irradiation.  She will be referred to radiation oncology after her surgical margins have been cleared. \par \par At the completion of all these treatments, she should get endocrine therapy, at current time she is premenopausal and will take tamoxifen for a total of at least 5 years.  \par \par Because of her young age at diagnosis, she qualifies for genetic testing.  COLOR genetic testing was performed and was negative for any mutations. \par \par All of her questions were answered.  She knows to call with any further questions or concerns.\par \par PLAN\par -f/up with Dr. Morgan regarding her RUL nodule\par -Rad onc referral for L WBI \par -f/up with me in 3 months after a b/l dx mammogram/US\par -will repeat MRI in 1 year (4/2020) given large area of nonmass enhancement in her right breast, although it was biopsy proven benign

## 2019-07-18 NOTE — REVIEW OF SYSTEMS
[As Noted in HPI] : as noted in HPI [Skin Wound] : skin wound [Negative] : Heme/Lymph [Fever] : no fever [Chills] : no chills [Skin Lesions] : no skin lesions [Breast Pain] : no breast pain [Breast Lump] : no breast lump

## 2019-07-31 PROCEDURE — 99244 OFF/OP CNSLTJ NEW/EST MOD 40: CPT

## 2019-08-06 PROCEDURE — 77332 RADIATION TREATMENT AID(S): CPT | Mod: 26

## 2019-08-06 PROCEDURE — 77290 THER RAD SIMULAJ FIELD CPLX: CPT | Mod: 26

## 2019-08-07 PROCEDURE — 77263 THER RADIOLOGY TX PLNG CPLX: CPT

## 2019-08-13 PROCEDURE — 77295 3-D RADIOTHERAPY PLAN: CPT | Mod: 26

## 2019-08-13 PROCEDURE — 77300 RADIATION THERAPY DOSE PLAN: CPT | Mod: 26

## 2019-08-13 PROCEDURE — 77334 RADIATION TREATMENT AID(S): CPT | Mod: 26

## 2019-08-19 PROCEDURE — 77280 THER RAD SIMULAJ FIELD SMPL: CPT | Mod: 26

## 2019-08-20 PROCEDURE — 77427 RADIATION TX MANAGEMENT X5: CPT

## 2019-08-26 ENCOUNTER — OTHER (OUTPATIENT)
Age: 49
End: 2019-08-26

## 2019-08-26 VITALS
RESPIRATION RATE: 18 BRPM | BODY MASS INDEX: 34.97 KG/M2 | TEMPERATURE: 96.2 F | SYSTOLIC BLOOD PRESSURE: 122 MMHG | HEART RATE: 66 BPM | WEIGHT: 173.13 LBS | DIASTOLIC BLOOD PRESSURE: 59 MMHG

## 2019-08-26 NOTE — PHYSICAL EXAM
[Normal] : well developed, well nourished, in no acute distress [de-identified] : No skin reaction

## 2019-08-26 NOTE — HISTORY OF PRESENT ILLNESS
[FreeTextEntry1] : Nursing: pt using aquaphor. Skin intact and dry, pt speaks  used #[id number].  \par Patient was told by oncologist to start tamoxifen after radiation.\par \par MD Note: She just started.  No new concerns.  \par

## 2019-08-26 NOTE — DISEASE MANAGEMENT
[Pathological] : TNM Stage: p [TTNM] : 1b [IA] : IA [NTNM] : 0 [MTNM] : 0 [de-identified] : 5240cGy [de-identified] : 3278bUi [de-identified] : Left Breast

## 2019-08-27 PROCEDURE — 77427 RADIATION TX MANAGEMENT X5: CPT

## 2019-09-04 ENCOUNTER — OTHER (OUTPATIENT)
Age: 49
End: 2019-09-04

## 2019-09-04 VITALS
TEMPERATURE: 99.4 F | HEART RATE: 64 BPM | DIASTOLIC BLOOD PRESSURE: 61 MMHG | SYSTOLIC BLOOD PRESSURE: 131 MMHG | WEIGHT: 172.5 LBS | BODY MASS INDEX: 34.77 KG/M2 | RESPIRATION RATE: 16 BRPM | HEIGHT: 59 IN

## 2019-09-04 PROCEDURE — 77427 RADIATION TX MANAGEMENT X5: CPT

## 2019-09-04 NOTE — PHYSICAL EXAM
[de-identified] : Minimal left breast erythema.  No desquamation.  [Normal] : well developed, well nourished, in no acute distress

## 2019-09-04 NOTE — DISEASE MANAGEMENT
[Pathological] : TNM Stage: p [FreeTextEntry4] : Invasive ductal carcinoma of the left breast, well differentiated, ER positive, [place] positive, HER 2 negative,  [TTNM] : 1b [NTNM] : 0 [MTNM] : 0 [IA] : IA [de-identified] : 4565 cGy [de-identified] : 5240 cGy [de-identified] : Left breast

## 2019-09-06 ENCOUNTER — OTHER (OUTPATIENT)
Age: 49
End: 2019-09-06

## 2019-09-09 ENCOUNTER — OTHER (OUTPATIENT)
Age: 49
End: 2019-09-09

## 2019-09-09 VITALS
TEMPERATURE: 98.7 F | SYSTOLIC BLOOD PRESSURE: 132 MMHG | WEIGHT: 172 LBS | RESPIRATION RATE: 16 BRPM | DIASTOLIC BLOOD PRESSURE: 61 MMHG | BODY MASS INDEX: 34.74 KG/M2 | HEART RATE: 60 BPM

## 2019-09-09 NOTE — PHYSICAL EXAM
[Normal] : well developed, well nourished, in no acute distress [de-identified] : Minimal erythema on left breast.  No desquamation.  No edema.

## 2019-09-09 NOTE — DISEASE MANAGEMENT
[FreeTextEntry4] : Invasive ductal carcinoma of the left breast, well differentiated, ER positive, CT positive, HER 2 negative,  [NTNM] : 0 [TTNM] : 1b [MTNM] : 0 [de-identified] : 5240 cGy [de-identified] : 3710 cGy [de-identified] : Left breast

## 2019-09-09 NOTE — HISTORY OF PRESENT ILLNESS
[FreeTextEntry1] : 9/9/19 Nursing: Faint erythema noted. Applying Aquaphor after treatment. Denies pain or discomfort in breast.\par \par MD Note:  As above.  No new concerns.   used.

## 2019-09-11 PROCEDURE — 77427 RADIATION TX MANAGEMENT X5: CPT

## 2019-09-16 ENCOUNTER — OTHER (OUTPATIENT)
Age: 49
End: 2019-09-16

## 2019-09-16 VITALS
DIASTOLIC BLOOD PRESSURE: 63 MMHG | RESPIRATION RATE: 16 BRPM | SYSTOLIC BLOOD PRESSURE: 132 MMHG | TEMPERATURE: 97.4 F | BODY MASS INDEX: 34.9 KG/M2 | HEART RATE: 57 BPM | WEIGHT: 172.8 LBS

## 2019-09-16 NOTE — PHYSICAL EXAM
[Normal] : well developed, well nourished, in no acute distress [de-identified] : Mild erythema on the left breast.  No desquamation

## 2019-09-16 NOTE — DISEASE MANAGEMENT
[Pathological] : TNM Stage: p [IA] : IA [FreeTextEntry4] : Invasive ductal carcinoma of the left breast, well differentiated, ER positive, [place] positive, HER 2 negative,  [NTNM] : 0 [TTNM] : 1b [MTNM] : 0 [de-identified] : 4990 cGy [de-identified] : 5240 cGy [de-identified] : Left breast

## 2019-09-16 NOTE — HISTORY OF PRESENT ILLNESS
[FreeTextEntry1] : 9/16/19 Nursing: Completing RT tomorrow. Applying Aquaphor after treatment. Denies pain or tenderness in treated breast. C/o mild itching, will apply cortisone. \par \par MD Note: She is doing well.  Notes mild pruritus on the breast.  No new concerns otherwise

## 2019-09-16 NOTE — REVIEW OF SYSTEMS
[Dermatitis Radiation: Grade 1 - Faint erythema or dry desquamation] : Dermatitis Radiation: Grade 1 - Faint erythema or dry desquamation [Pruritus: Grade 1 - Mild or localized; topical intervention indicated] : Pruritus: Grade 1 - Mild or localized; topical intervention indicated

## 2019-09-16 NOTE — REASON FOR VISIT
[Routine On-Treatment] : a routine on-treatment visit for [Breast Cancer] : breast cancer [[employer] ] : provided by [employer]   [FreeTextEntry1] : [id number] [TWNoteComboBox1] : [place] [FreeTextEntry2] : [place]

## 2019-09-17 ENCOUNTER — OUTPATIENT (OUTPATIENT)
Dept: OUTPATIENT SERVICES | Facility: HOSPITAL | Age: 49
LOS: 1 days | Discharge: HOME | End: 2019-09-17
Payer: SUBSIDIZED

## 2019-09-17 DIAGNOSIS — C50.512 MALIGNANT NEOPLASM OF LOWER-OUTER QUADRANT OF LEFT FEMALE BREAST: ICD-10-CM

## 2019-09-17 DIAGNOSIS — Z98.890 OTHER SPECIFIED POSTPROCEDURAL STATES: Chronic | ICD-10-CM

## 2019-10-03 ENCOUNTER — OUTPATIENT (OUTPATIENT)
Dept: OUTPATIENT SERVICES | Facility: HOSPITAL | Age: 49
LOS: 1 days | Discharge: HOME | End: 2019-10-03

## 2019-10-03 ENCOUNTER — APPOINTMENT (OUTPATIENT)
Dept: HEMATOLOGY ONCOLOGY | Facility: CLINIC | Age: 49
End: 2019-10-03
Payer: COMMERCIAL

## 2019-10-03 VITALS
DIASTOLIC BLOOD PRESSURE: 60 MMHG | HEIGHT: 59 IN | TEMPERATURE: 97.2 F | RESPIRATION RATE: 16 BRPM | SYSTOLIC BLOOD PRESSURE: 130 MMHG | HEART RATE: 59 BPM

## 2019-10-03 DIAGNOSIS — C50.412 MALIGNANT NEOPLASM OF UPPER-OUTER QUADRANT OF LEFT FEMALE BREAST: ICD-10-CM

## 2019-10-03 DIAGNOSIS — Z98.890 OTHER SPECIFIED POSTPROCEDURAL STATES: Chronic | ICD-10-CM

## 2019-10-03 PROCEDURE — 99214 OFFICE O/P EST MOD 30 MIN: CPT

## 2019-10-06 NOTE — PHYSICAL EXAM
[Fully active, able to carry on all pre-disease performance without restriction] : Status 0 - Fully active, able to carry on all pre-disease performance without restriction [Normal Male] : prostate smooth, symmetric with no modularity or induration [Normal] : normal appearance, no rash, nodules, vesicles, ulcers, erythema [de-identified] : Status post left lumpectomy and SLNB. The surgical scars are healing well. There is no palpable abnormality. ost radiation erythema noted to left breast, no tenderness to palpation,

## 2019-10-06 NOTE — HISTORY OF PRESENT ILLNESS
[de-identified] : 48 yr old female is referred by Dr. Tiwari for consultation of adjuvant systemic therapy for newly diagnosed right breast cancer. The patient felt a palpable lump in her left breast  and underwent diagnostic workup.  \par \par Bilateral dx mammo and US on 3/13/19 revealed right breast mass  - @1N2, mass measuring 1.2 x 1.4 x 0.5 cm, no change, deemed BIRADS 3 -@6N4, mass measuring 0.6 x 0.9 x 0.4 cm, no change, deemed BIRADS 3. LEFT breast @12N2, cyst measuring 1 x 1 x 0.8 cm - @5N5, mass measuring 1.2 x 1.4 x 0.5 cm, no change, deemed BIRADS 3 -@3-4N4, ill defined mass, measuring 0.5 x 0.7 x 0.6 cm, mammographically occult, BIOPSY recommended for BIRADS 4 of above lesion\par \par On 3/18/19, she underwent a left US CNBx @3-4N4 which revealed microscopic IDC, well differentiated ER/IN positive, Her 2 negative\par \par On 19, b/l breast MRI showed Right breast 6.7 cm nonmass enhancement in inferior R breast --> rec BIOPSY of anterior and posterior aspect, and left breast lateral mid depth, irregular enhancing mass measuring 0.9 cm consistent with known cancer \par \par 19 -- CT Chest: 3 mm solid nodule in RUL \par \par 19 -- R MRI guided biopsy x 2 \par -benign proliferative type FC changes x 2\par \par The patient had COLOR genetic testing. She is negative for any mutations.\par \par On 6/3/19, she underwent left WLE with SLN Bx. The pathology showed 9 mm IDC, well differentiated, focal lobular features, low nuclear grade, invasive cancer extends to broadly involve inferior and medial margins. There was cLCIS, radial scar. 5 SLNs were negative for malignancy.  The invasive tumor was ER/IN pos 80%, Her 2 neg on IHC, Ki 67 3-5%.\par \par The surgical specimen was sent for Oncotype dx analysis. her RS is 7, in the low risk range and predicting 3% risk of distant recurrence with endocrine therapy alone.\par \par The patient is previously healthy, no PMH, no family history of breast cancer or other cancers. She is , age at first live birth was 24. No prior OCP use.\par She denies smoking or drinking. She is up to date on pap smear and colonoscopy . Her LMP was on 2019. \par \par She is here to discuss systemic adjuvant therapy. She is able to speak English and understands conversation well. We tried to use  service but there was connection problem.\par \par \par \par \par \par \par \par  [de-identified] : \par 10/3/19\par Patient is here today for follow up visit.  She offers no complaints She completed radiation #21 on 9/17/19 and started Tamoxifen 10/1/19.  She is tolerating Tamoxifen so far.  She continues to get monthly menses. She has a follow up appt with Dr. Tiwari on 10/16/19.\par Previous labs and scans reviewed.  She is due to have a CT Chest for re-evaluation of a 3 mm solid nodule RUL.

## 2019-10-06 NOTE — CONSULT LETTER
[Dear  ___] : Dear  [unfilled], [Please see my note below.] : Please see my note below. [Sincerely,] : Sincerely, [DrGumaro  ___] : Dr. BREWSTER [Courtesy Letter:] : I had the pleasure of seeing your patient, [unfilled], in my office today. [FreeTextEntry3] : Chung Lozada MD

## 2019-10-06 NOTE — ASSESSMENT
[FreeTextEntry1] : 47 yo premenopausal female has IDC of the left breast, stage IA ( [id number]), ER/AK positive, Her 2 neg, G1, s/p lumpectomy and SLNB.\par RS 7, in the low risk range.\par \par Recommendation:\par -- Continue Tamoxifen daily.\par -- She is given a referral for CT chest without contrast to followup  3 mm solid nodule in RUL,\par -- b/l surveillance mammogram every 6 months.\par -- Followup with PCP and GYN for health maintenance.\par -- RTO for followup in 6 months.\par \par \par \par \par \par \par

## 2019-10-08 DIAGNOSIS — Z51.81 ENCOUNTER FOR THERAPEUTIC DRUG LEVEL MONITORING: ICD-10-CM

## 2019-10-08 DIAGNOSIS — R91.1 SOLITARY PULMONARY NODULE: ICD-10-CM

## 2019-10-16 ENCOUNTER — FORM ENCOUNTER (OUTPATIENT)
Age: 49
End: 2019-10-16

## 2019-10-16 ENCOUNTER — APPOINTMENT (OUTPATIENT)
Dept: BREAST CENTER | Facility: CLINIC | Age: 49
End: 2019-10-16
Payer: COMMERCIAL

## 2019-10-16 VITALS
TEMPERATURE: 98.6 F | HEIGHT: 59 IN | SYSTOLIC BLOOD PRESSURE: 134 MMHG | DIASTOLIC BLOOD PRESSURE: 86 MMHG | BODY MASS INDEX: 34.68 KG/M2 | WEIGHT: 172 LBS

## 2019-10-16 PROCEDURE — 99213 OFFICE O/P EST LOW 20 MIN: CPT

## 2019-10-17 ENCOUNTER — OUTPATIENT (OUTPATIENT)
Dept: OUTPATIENT SERVICES | Facility: HOSPITAL | Age: 49
LOS: 1 days | Discharge: HOME | End: 2019-10-17
Payer: SUBSIDIZED

## 2019-10-17 ENCOUNTER — APPOINTMENT (OUTPATIENT)
Dept: RADIATION ONCOLOGY | Facility: HOSPITAL | Age: 49
End: 2019-10-17
Payer: SUBSIDIZED

## 2019-10-17 VITALS
SYSTOLIC BLOOD PRESSURE: 118 MMHG | RESPIRATION RATE: 16 BRPM | DIASTOLIC BLOOD PRESSURE: 56 MMHG | TEMPERATURE: 97.4 F | WEIGHT: 172 LBS | BODY MASS INDEX: 34.74 KG/M2 | HEART RATE: 57 BPM

## 2019-10-17 DIAGNOSIS — Z98.890 OTHER SPECIFIED POSTPROCEDURAL STATES: Chronic | ICD-10-CM

## 2019-10-17 DIAGNOSIS — C50.412 MALIGNANT NEOPLASM OF UPPER-OUTER QUADRANT OF LEFT FEMALE BREAST: ICD-10-CM

## 2019-10-17 PROCEDURE — 99024 POSTOP FOLLOW-UP VISIT: CPT

## 2019-10-17 PROCEDURE — 71250 CT THORAX DX C-: CPT | Mod: 26

## 2019-10-17 NOTE — HISTORY OF PRESENT ILLNESS
[FreeTextEntry1] : Frances is a 49 premenopausal F who presents with a US detected L breast cancer, [id number], ER/CA positive, her 2 neg stage 1 breast cancer, s/p L WLE + SLN Bx on 6/3/19, s/p L breast re-excision on 19.\par \par -oncotype DX 7\par -no chemotherapy offered\par -s/p L WBI on 19 - 19 \par -s/p tamoxifen since 10/1/19\par \par Her work up was as follows: \par 3/13/19 -- L dx tomosynthesis; b/l US \par RIGHT \par -@1N2, mass measuring 1.2 x 1.4 x 0.5 cm, no change, deemed BIRADS 3\par -@6N4, mass measuring 0.6 x 0.9 x 0.4 cm, no change, deemed BIRADS 3\par LEFT: \par -@12N2, cyst measuring 1 x 1 x 0.8 cm \par -@5N5, mass measuring 1.2 x 1.4 x 0.5 cm, no change, deemed BIRADS 3\par -@3-4N4, il defined mass, measuring 0.5 x 0.7 x 0.6 cm, mammographically occult, BIOPSY \par BIRADS 4 of above lesion\par \par 3/18/19 -- L US CNBx @3-4N4\par -micropscopic IDC, well differentiated\par -ER/CA positive, Her 2 pending (Abel )\par \par 19 -- breast MRI \par R: 6.7 cm nonmass enhancement in inferior R breast --> rec BIOPSY of anterior and posterior aspect \par L: lateral mid depth, irregular enhancing mass measuring 0.9 cm consistent with known cancer \par -b/l breast cysts \par -abnormal signal in anterior right lung base \par \par 19 -- CT Chest \par -3 mm solid nodule in RUL \par -rec: no further imaging if patient is low risk \par \par 19 -- R MRI guided biopsy x 2 \par -benign proliferative type FC changes x 2\par \par COLOR genetic testing \par -negative for any mutations \par \par 6/3/19 -- L WLE with SLN Bx\par -IDC, well differentiated, focal lobular features, SBR 1\par -T=9mm\par -EIC, low nuclear grade\par -invasive cancer extends to broadly involve inferior and medial margins\par -cLCIS, radial scar \par -0/5 SLN + for metastatic disease \par -ER/CA pos, Her 2 neg on IHC\par -ONCOTYPE score of 7\par \par 19 -- L breast mass re-excision \par -residual foci of IDC, 2mm, located 0.5 mm from new superior/anterior margin\par -extensive DCIS, low nuclear grade, 1 mm from medial/anterior margin and 1.5 mm from lateral margin \par \par HISTORICAL RISK FACTORS: \par -1 prior breast biopsy as above \par -no family history of breast or ovarian cancer \par - , age at first live birth was 24\par -no prior OCP use \par -COLOR genetic testing negative\par \par INTERVAL HISTORY: \par Frances is a 48 premenopausal F with left breast cancer.  She presents today for a 3 month follow up visit, s/p L WLE with SLN Bx on 6/3/19, s/p L breast mass re-excision on 19.  \par \par Her final pathology from her re-excision revealed an additional 2mm of IDC, located 0.5 mm from her new superior/anterior margin and extensive DCIS, located 1 mm from her medial/anterior margin, and 1.5 mm from her lateral margin. \par \par She has since completed L WBI successfully on 19.  \par \par She started the tamoxifen on 10/1/19 is tolerating it well.  She does have hot flashes but denies any other complaints. \par \par She is due for a b/l dx mammogram and US on 10/18/19. \par \par She has occasional sharp pains and hyperpigmentation from her radiation therapy, but otherwise has not palpated any abnormal masses, denies any nipple discharge, and denies any right sided breast complaints. \par \par As review: \par Her final pathology revealed a 9mm IDC, well differentiated, SBR 1, extensive DCIS that was low nuclear grade, however the medial and inferior margin were positive.  She had 5 LN removed which were all negative, and the tumor was ER/CA positive, Her 2 negative on IHC, oncotype RS 7.

## 2019-10-17 NOTE — HISTORY OF PRESENT ILLNESS
[FreeTextEntry1] : EDITH REYESCARINO returns in follow up visit.    As you know, EDITH REYESCARINO is a 49 year old woman with invasive ductal carcinoma of the left breast, well differentiated, ER positive, ME positive, HER 2 negative, [id number], Stage IA.  She is s/p breast conserving surgery.  She received left breast radiation to a dose of 5240cGy from 8/20/19 through 9/17/19.  In the interim, she has done well.  She is on endocrine therapy.  \par \par 10/17/19 Nursing: Pt returns for a post treatment follow up after completing RT to left breast 9/17/19. Denies any issues since completion of RT. Left breast hyperpigmentation noted with dry desquamation, continues to moisturize as instructed. Followed up with Dr. Tiwari 10/16/19, scheduled for B/L mammogram 10/29/19. Currently on tamoxifen, tolerating well. Followed up with Dr. Lozada 10/3/19. CT chest f/u RUL pulmonary nodules scheduled for today.

## 2019-10-17 NOTE — DISEASE MANAGEMENT
[Pathological] : TNM Stage: p [IA] : IA [NTNM] : 0 [TTNM] : 1b [FreeTextEntry4] : Invasive ductal carcinoma of the left breast, well differentiated, ER positive, NE positive, HER 2 negative,  [MTNM] : 0 [de-identified] : Left breast

## 2019-10-17 NOTE — REASON FOR VISIT
[Routine Follow-Up] : routine follow-up visit for [Breast Cancer] : breast cancer [[employer] ] : provided by [employer]   [FreeTextEntry1] : [id number] [FreeTextEntry2] : [place] [TWNoteComboBox1] : [place] Island[place]

## 2019-10-17 NOTE — PHYSICAL EXAM
[Normocephalic] : normocephalic [Atraumatic] : atraumatic [EOMI] : extra ocular movement intact [No Supraclavicular Adenopathy] : no supraclavicular adenopathy [No Cervical Adenopathy] : no cervical adenopathy [No Rashes] : no rashes [No Ulceration] : no ulceration [No Axillary Lymphadenopathy] : no left axillary lymphadenopathy [Soft] : abdomen soft [Not Tender] : non-tender [No Edema] : no edema [No dominant masses] : no dominant masses in right breast  [No dominant masses] : no dominant masses left breast [No Nipple Retraction] : no left nipple retraction [No Nipple Discharge] : no left nipple discharge [de-identified] : no suspicious masses palpated  [de-identified] : two surgical incisions are well healed; dense scar tissue in the area of her lumpectomy but no suspicious masses are palpated; she does have hyperpigmentation and some skin excoriation from her recent history of radiation therapy

## 2019-10-17 NOTE — PAST MEDICAL HISTORY
[Menstruating] : The patient is menstruating [Menarche Age ____] : age at menarche was [unfilled] [Total Preg ___] : G[unfilled] [Live Births ___] : P[unfilled]  [Age At Live Birth ___] : Age at live birth: [unfilled] [FreeTextEntry5] : denies  [History of Hormone Replacement Treatment] : has no history of hormone replacement treatment [FreeTextEntry7] : denies  [FreeTextEntry6] : denies [FreeTextEntry8] : yes in past

## 2019-10-17 NOTE — PHYSICAL EXAM
[Normal] : oriented to person, place and time, the affect was normal, the mood was normal and not anxious [de-identified] : Mild residual hyperpigmentation on left breast with dry desquamation in left IM fold.  Mild soft tissue fibrosis in left breast surgical bed.  No palpable mass in either breast.

## 2019-10-17 NOTE — ASSESSMENT
[FreeTextEntry1] : Frances is a 48 premenopausal F with an US detected L breast cancer, [id number], ER/MA positive, her 2 neg stage 1 breast cancer, s/p L WLE + SLN Bx on 6/3/19, s/p L breast mass re-excision on 7/8/19 with negative margins. stage 1 A breast cancer. \par \par -s/p  L WLE and SLN Bx on 6/3/19\par --final pathology revealed positive inferior and medial margins\par \par s/p L breast re-excision on 7/8/19\par -final pathology revealed negative surgical margins\par \par ONCOTYPE RS 7\par -no chemotherapy \par \par s/p L WBI, completed on 9/12/19\par s/p tamoxifen, started on 10/1/19\par \par On exam, she does have post radiaition skin changes in her left breast, but no suspicious masses were palpated in either breast.  She is due for a b/l dx mammogram and US on 10/18/2019. This was already scheduled for her.  I will have her follow up in 6 months after a breast MRI due on 4/19/2020 if her mammogram is unrevealing. \par \par Her final surgical margins were negative, although she did have widespread DCIS which had close margins, but not positive.  There is no indication for further surgical excision at this time. \par \par AS REVIEW: \par She underwent a breast MRI which revealed nonmass enhancement spanning 6.7 cm in her inferior RIGHT breast which was biopsy proven benign proliferative type fibrocystic changes.  No additional abnormalities were noted in her left breast.  We will repeat the MRI 4/19/2020.\par \par She also had a chest CT which revealed a 3 mm solid nodule in her RUL for which no further imaging was indicated as she is a low risk patient.  However, per Dr. Lozada's last note, she will be scheduled for a repeat CT Chest. \par \par SHe also underwent COLOR genetic panel testing which was negative for any mutations. \par \par All of her questions were answered.  She knows to call with any further questions or concerns.\par \par PLAN\par -f/up with Dr. Morgan regarding her RUL nodule, due for follow up CT Chest \par -b/l dx mammogram/US 10/18/19\par -will repeat MRI (4/2020) given large area of nonmass enhancement in her right breast, although it was biopsy proven benign  \par -f/up in 6 months; after breast MRI if mammogram is unrevealing

## 2019-10-17 NOTE — LETTER CLOSING
[Consult Closing:] : Thank you for allowing me to participate in the care of this patient.  If you have any questions, please do not hesitate to contact me. [Sincerely yours,] : Sincerely yours, [FreeTextEntry3] : Arielle Borja M.D. \par \par Electronically proofread and signed by:  Arielle Borja MD\par Attending, Department of Radiation Medicine\par [facility]\par \par CC: Dr. Chung Lozada

## 2019-10-17 NOTE — REVIEW OF SYSTEMS
[As Noted in HPI] : as noted in HPI [Skin Wound] : skin wound [Negative] : Heme/Lymph [Breast Pain] : breast pain [Fever] : no fever [Skin Lesions] : no skin lesions [Chills] : no chills [Breast Lump] : no breast lump

## 2019-10-28 ENCOUNTER — FORM ENCOUNTER (OUTPATIENT)
Age: 49
End: 2019-10-28

## 2019-10-29 ENCOUNTER — OUTPATIENT (OUTPATIENT)
Dept: OUTPATIENT SERVICES | Facility: HOSPITAL | Age: 49
LOS: 1 days | Discharge: HOME | End: 2019-10-29
Payer: OTHER GOVERNMENT

## 2019-10-29 DIAGNOSIS — Z98.890 OTHER SPECIFIED POSTPROCEDURAL STATES: Chronic | ICD-10-CM

## 2019-10-29 DIAGNOSIS — R92.8 OTHER ABNORMAL AND INCONCLUSIVE FINDINGS ON DIAGNOSTIC IMAGING OF BREAST: ICD-10-CM

## 2019-10-29 PROCEDURE — 77066 DX MAMMO INCL CAD BI: CPT | Mod: 26

## 2019-10-29 PROCEDURE — G0279: CPT | Mod: 26

## 2019-10-29 PROCEDURE — 76642 ULTRASOUND BREAST LIMITED: CPT | Mod: 26,50

## 2020-01-02 ENCOUNTER — OUTPATIENT (OUTPATIENT)
Dept: OUTPATIENT SERVICES | Facility: HOSPITAL | Age: 50
LOS: 1 days | Discharge: HOME | End: 2020-01-02

## 2020-01-02 ENCOUNTER — APPOINTMENT (OUTPATIENT)
Dept: HEMATOLOGY ONCOLOGY | Facility: CLINIC | Age: 50
End: 2020-01-02
Payer: COMMERCIAL

## 2020-01-02 VITALS
BODY MASS INDEX: 33.47 KG/M2 | TEMPERATURE: 97.3 F | SYSTOLIC BLOOD PRESSURE: 140 MMHG | DIASTOLIC BLOOD PRESSURE: 76 MMHG | WEIGHT: 166 LBS | HEART RATE: 50 BPM | RESPIRATION RATE: 14 BRPM | HEIGHT: 59 IN

## 2020-01-02 DIAGNOSIS — Z98.890 OTHER SPECIFIED POSTPROCEDURAL STATES: Chronic | ICD-10-CM

## 2020-01-02 PROCEDURE — 99214 OFFICE O/P EST MOD 30 MIN: CPT

## 2020-01-05 NOTE — CONSULT LETTER
[Dear  ___] : Dear  [unfilled], [Courtesy Letter:] : I had the pleasure of seeing your patient, [unfilled], in my office today. [Please see my note below.] : Please see my note below. [Sincerely,] : Sincerely, [DrGumaro  ___] : Dr. BREWSTER [FreeTextEntry3] : Chung Lozada MD

## 2020-01-05 NOTE — PHYSICAL EXAM
[Fully active, able to carry on all pre-disease performance without restriction] : Status 0 - Fully active, able to carry on all pre-disease performance without restriction [Normal Male] : prostate smooth, symmetric with no modularity or induration [Normal] : full range of motion and no deformities appreciated [de-identified] : Status post left lumpectomy and SLNB. The surgical scars are healing well. There is no palpable abnormality. ost radiation erythema noted to left breast, no tenderness to palpation,

## 2020-01-05 NOTE — ASSESSMENT
[FreeTextEntry1] : 47 yo premenopausal female has IDC of the left breast, stage IA ( [id number]), ER/[place] positive, Her 2 neg, G1, s/p lumpectomy and SLNB.\par RS 7, in the low risk range.\par \par Recommendation:\par -- Continue Tamoxifen daily.\par -- Discussed CT chest report. it showed stable 3 mm solid nodule in RUL, Will continue observation. \par -- b/l dx mammogram and left breast US in 4/2020.\par -- Followup with PCP and GYN for health maintenance.\par -- RTO for followup in 6 months.\par \par \par \par \par \par \par

## 2020-01-05 NOTE — HISTORY OF PRESENT ILLNESS
[de-identified] : 48 yr old female is referred by Dr. Tiwari for consultation of adjuvant systemic therapy for newly diagnosed right breast cancer. The patient felt a palpable lump in her left breast  and underwent diagnostic workup.  \par \par Bilateral dx mammo and US on 3/13/19 revealed right breast mass  - @1N2, mass measuring 1.2 x 1.4 x 0.5 cm, no change, deemed BIRADS 3 -@6N4, mass measuring 0.6 x 0.9 x 0.4 cm, no change, deemed BIRADS 3. LEFT breast @12N2, cyst measuring 1 x 1 x 0.8 cm - @5N5, mass measuring 1.2 x 1.4 x 0.5 cm, no change, deemed BIRADS 3 -@3-4N4, ill defined mass, measuring 0.5 x 0.7 x 0.6 cm, mammographically occult, BIOPSY recommended for BIRADS 4 of above lesion\par \par On 3/18/19, she underwent a left US CNBx @3-4N4 which revealed microscopic IDC, well differentiated ER/MA positive, Her 2 negative\par \par On 19, b/l breast MRI showed Right breast 6.7 cm nonmass enhancement in inferior R breast --> rec BIOPSY of anterior and posterior aspect, and left breast lateral mid depth, irregular enhancing mass measuring 0.9 cm consistent with known cancer \par \par 19 -- CT Chest: 3 mm solid nodule in RUL \par \par 19 -- R MRI guided biopsy x 2 \par -benign proliferative type FC changes x 2\par \par The patient had COLOR genetic testing. She is negative for any mutations.\par \par On 6/3/19, she underwent left WLE with SLN Bx. The pathology showed 9 mm IDC, well differentiated, focal lobular features, low nuclear grade, invasive cancer extends to broadly involve inferior and medial margins. There was cLCIS, radial scar. 5 SLNs were negative for malignancy.  The invasive tumor was ER/MA pos 80%, Her 2 neg on IHC, Ki 67 3-5%.\par \par The surgical specimen was sent for Oncotype dx analysis. her RS is 7, in the low risk range and predicting 3% risk of distant recurrence with endocrine therapy alone.\par \par The patient is previously healthy, no PMH, no family history of breast cancer or other cancers. She is , age at first live birth was 24. No prior OCP use.\par She denies smoking or drinking. She is up to date on pap smear and colonoscopy . Her LMP was on 2019. \par \par She is here to discuss systemic adjuvant therapy. She is able to speak English and understands conversation well. We tried to use  service but there was connection problem.\par \par \par \par \par \par \par \par  [de-identified] : \par 10/3/19\par Patient is here today for follow up visit.  She offers no complaints She completed radiation #21 on 9/17/19 and started Tamoxifen 10/1/19.  She is tolerating Tamoxifen so far.  She continues to get monthly menses. She has a follow up appt with Dr. Tiwari on 10/16/19.\par Previous labs and scans reviewed.  She is due to have a CT Chest for re-evaluation of a 3 mm solid nodule RUL.\par \par 1/2/20:\par Patient is here today for follow up visit.  She completed adjuvant WBRT in 9/2019. She has been taking Tamoxifen since 10/1/19.  She is tolerating Tamoxifen well.  \par She had repeat dx mammo and US in 10/2019 which showed stable architectural distortion most consistent with postsurgical change. Two benign right breast masses and one benign left breast mass as above. No evidence of malignancy. As per post lumpectomy routine, a follow-up unilateral left mammogram is recommended. \par She also had CT chest in 10/2019 which showed stable 3 mm solid nodule RUL. There was no other abnormal finding. She is feeling well and has no breast related symptoms.

## 2020-01-07 DIAGNOSIS — C50.412 MALIGNANT NEOPLASM OF UPPER-OUTER QUADRANT OF LEFT FEMALE BREAST: ICD-10-CM

## 2020-01-07 DIAGNOSIS — Z51.81 ENCOUNTER FOR THERAPEUTIC DRUG LEVEL MONITORING: ICD-10-CM

## 2020-04-16 ENCOUNTER — APPOINTMENT (OUTPATIENT)
Dept: RADIATION ONCOLOGY | Facility: HOSPITAL | Age: 50
End: 2020-04-16
Payer: COMMERCIAL

## 2020-04-16 PROCEDURE — 99442: CPT

## 2020-04-16 NOTE — HISTORY OF PRESENT ILLNESS
[Home] : at home, [unfilled] , at the time of the visit. [Other Location: e.g. Home (Enter Location, City,State)___] : at [unfilled] [Patient] : the patient [Self] : self [FreeTextEntry4] : Shante Wood [FreeTextEntry1] : \par EDITH REYESCARINO is evaluated in telehealth follow up.  This was a telephone call only.   As you know, EDITH REYESCARINO is a 49 year old woman with invasive ductal carcinoma of the left breast, well differentiated, ER positive, MS positive, HER 2 negative, [id number], Stage IA.  She is s/p breast conserving surgery.  She received left breast radiation to a dose of 5240cGy from 8/20/19 through 9/17/19.  I last saw her in October 2019.  In the interim, she has done well.  She continues on endocrine therapy.  She has no breast pain.  The dry desquamation on the treated breast has resolved.  She has a mammogram scheduled for next week.

## 2020-04-16 NOTE — LETTER CLOSING
[Consult Closing:] : Thank you for allowing me to participate in the care of this patient.  If you have any questions, please do not hesitate to contact me. [Sincerely yours,] : Sincerely yours, [FreeTextEntry3] : Arielle Borja M.D. \par \par Electronically proofread and signed by:  Arielle Borja MD\par Attending, Department of Radiation Medicine\par [facility]\par \par CC: Dr. Chung Lozada

## 2020-04-21 ENCOUNTER — RESULT REVIEW (OUTPATIENT)
Age: 50
End: 2020-04-21

## 2020-04-21 ENCOUNTER — OUTPATIENT (OUTPATIENT)
Dept: OUTPATIENT SERVICES | Facility: HOSPITAL | Age: 50
LOS: 1 days | Discharge: HOME | End: 2020-04-21
Payer: SUBSIDIZED

## 2020-04-21 DIAGNOSIS — Z98.890 OTHER SPECIFIED POSTPROCEDURAL STATES: Chronic | ICD-10-CM

## 2020-04-21 DIAGNOSIS — C50.412 MALIGNANT NEOPLASM OF UPPER-OUTER QUADRANT OF LEFT FEMALE BREAST: ICD-10-CM

## 2020-04-21 PROCEDURE — 77049 MRI BREAST C-+ W/CAD BI: CPT | Mod: 26

## 2020-04-28 ENCOUNTER — RESULT REVIEW (OUTPATIENT)
Age: 50
End: 2020-04-28

## 2020-04-28 ENCOUNTER — OUTPATIENT (OUTPATIENT)
Dept: OUTPATIENT SERVICES | Facility: HOSPITAL | Age: 50
LOS: 1 days | Discharge: HOME | End: 2020-04-28
Payer: SUBSIDIZED

## 2020-04-28 DIAGNOSIS — R92.8 OTHER ABNORMAL AND INCONCLUSIVE FINDINGS ON DIAGNOSTIC IMAGING OF BREAST: ICD-10-CM

## 2020-04-28 DIAGNOSIS — Z98.890 OTHER SPECIFIED POSTPROCEDURAL STATES: Chronic | ICD-10-CM

## 2020-04-28 PROCEDURE — 88305 TISSUE EXAM BY PATHOLOGIST: CPT | Mod: 26

## 2020-04-28 PROCEDURE — 19085 BX BREAST 1ST LESION MR IMAG: CPT | Mod: RT

## 2020-04-28 PROCEDURE — 77065 DX MAMMO INCL CAD UNI: CPT | Mod: 26,RT

## 2020-04-29 LAB — SURGICAL PATHOLOGY STUDY: SIGNIFICANT CHANGE UP

## 2020-04-30 ENCOUNTER — OUTPATIENT (OUTPATIENT)
Dept: OUTPATIENT SERVICES | Facility: HOSPITAL | Age: 50
LOS: 1 days | Discharge: HOME | End: 2020-04-30
Payer: MEDICAID

## 2020-04-30 ENCOUNTER — RESULT REVIEW (OUTPATIENT)
Age: 50
End: 2020-04-30

## 2020-04-30 DIAGNOSIS — R92.8 OTHER ABNORMAL AND INCONCLUSIVE FINDINGS ON DIAGNOSTIC IMAGING OF BREAST: ICD-10-CM

## 2020-04-30 DIAGNOSIS — Z98.890 OTHER SPECIFIED POSTPROCEDURAL STATES: Chronic | ICD-10-CM

## 2020-04-30 PROCEDURE — G0279: CPT | Mod: 26

## 2020-04-30 PROCEDURE — 77065 DX MAMMO INCL CAD UNI: CPT | Mod: 26,LT

## 2020-06-02 ENCOUNTER — APPOINTMENT (OUTPATIENT)
Dept: BREAST CENTER | Facility: CLINIC | Age: 50
End: 2020-06-02
Payer: MEDICAID

## 2020-06-02 VITALS
WEIGHT: 166 LBS | DIASTOLIC BLOOD PRESSURE: 82 MMHG | TEMPERATURE: 98.7 F | HEIGHT: 59 IN | SYSTOLIC BLOOD PRESSURE: 130 MMHG | BODY MASS INDEX: 33.47 KG/M2

## 2020-06-02 DIAGNOSIS — Z78.9 OTHER SPECIFIED HEALTH STATUS: ICD-10-CM

## 2020-06-02 PROCEDURE — 99213 OFFICE O/P EST LOW 20 MIN: CPT

## 2020-06-03 PROBLEM — Z78.9 NON-SMOKER: Status: ACTIVE | Noted: 2017-07-05

## 2020-06-04 NOTE — PHYSICAL EXAM
[Normocephalic] : normocephalic [Atraumatic] : atraumatic [EOMI] : extra ocular movement intact [No Supraclavicular Adenopathy] : no supraclavicular adenopathy [No Cervical Adenopathy] : no cervical adenopathy [No dominant masses] : no dominant masses in right breast  [No dominant masses] : no dominant masses left breast [No Nipple Retraction] : no left nipple retraction [No Nipple Discharge] : no left nipple discharge [No Axillary Lymphadenopathy] : no left axillary lymphadenopathy [Soft] : abdomen soft [Not Tender] : non-tender [No Edema] : no edema [No Rashes] : no rashes [No Ulceration] : no ulceration [de-identified] : no suspicious masses palpated  [de-identified] : two surgical incisions are well healed; dense scar tissue in the area of her lumpectomy, improved since her last visit, but no suspicious masses are palpated; she does have hyperpigmentation from her history of radiation therapy

## 2020-06-04 NOTE — PAST MEDICAL HISTORY
[Menstruating] : The patient is menstruating [Menarche Age ____] : age at menarche was [unfilled] [Total Preg ___] : G[unfilled] [Live Births ___] : P[unfilled]  [Age At Live Birth ___] : Age at live birth: [unfilled] [FreeTextEntry5] : denies  [History of Hormone Replacement Treatment] : has no history of hormone replacement treatment [FreeTextEntry6] : denies [FreeTextEntry8] : yes in past  [FreeTextEntry7] : denies

## 2020-06-04 NOTE — REVIEW OF SYSTEMS
[As Noted in HPI] : as noted in HPI [Negative] : Heme/Lymph [Hot Flashes] : hot flashes [Fever] : no fever [Skin Lesions] : no skin lesions [Chills] : no chills [Skin Wound] : no skin wound [Breast Pain] : no breast pain [Breast Lump] : no breast lump

## 2020-06-04 NOTE — REASON FOR VISIT
[Source: ______] : History obtained from [unfilled] [FreeTextEntry1] : left breast cancer, right breast radial scar

## 2020-06-04 NOTE — DATA REVIEWED
[FreeTextEntry1] : EXAM: MR BREAST WAW IC BI \par \par \par PROCEDURE DATE: 04/21/2020 \par \par \par \par \par INTERPRETATION: Clinical History / Reason for exam: Left lumpectomy in May \par 2019. \par \par Additional history: None \par \par Technique: Breast MRI is performed at 1.5 T with the patient prone and the \par breasts in a dedicated breast coil. Following a 3 plane localizer, sagittal \par T1 weighted, fat-saturated T1 weighted and fat saturated T2-weighted \par sequence; dynamic contrast enhanced sagittal images; and delayed \par post-contrast axial fat-saturated T1 weighted images were obtained. 8 mL \par gadolinium contrast was injected and 2 mL was discarded. Subtraction and MIP \par images were reviewed. [employer] software was used in interpretation. \par \par Comparison: Priors dating back to June 2017. Prior MRI dated 4/19/2019 \par \par Findings: \par \par Amount of fibroglandular tissue: Heterogeneous fibroglandular tissue \par \par Background parenchymal enhancement: Marked, Symmetric; this lowers the \par sensitivity of breast MRI. \par \par RIGHT BREAST: \par At the 12 o'clock axis, middle depth, there is a 0.5 cm enhancing mass which \par appears indeterminant. (Series 700, image 169 and series 8, image 65). There \par are 2 foci of susceptibility artifact consistent with prior benign biopsies \par in the inferior right breast. Multiple nonenhancing cysts. \par \par The nipple and skin appear normal. \par There is no axillary adenopathy. \par \par LEFT BREAST: \par There is postsurgical distortion in the lateral left breast consistent with \par postlumpectomy changes. Multiple nonenhancing cysts. No suspicious enhancing \par mass or suspicious area of enhancement is identified. \par \par The nipple and skin appear normal. \par There is no axillary adenopathy. \par \par The imaged portions of the chest and abdomen are unremarkable. \par \par Impression: \par \par Postsurgical changes of the left breast, consistent with lumpectomy changes. \par \par At the 12:00 axis of the right breast, there is a 0.5 cm indeterminate \par enhancing mass for which MRI guided biopsy is recommended. \par \par Recommendation: MRI guided biopsy. \par \par BI-RADS Category 4: Suspicious \par \par The above findings and recommendations were discussed with Dr. Tiwari on \par 4/21/2020 at 3:35 PM. \par \par \par \par \par \par \par EDY GRADY M.D., ATTENDING RADIOLOGIST \par This document has been electronically signed. Apr 21 2020 3:40PM \par \par \par \par \par EXAM: MR BX BRST 1ST RT SISC \par \par *** ADDENDUM 04/29/2020 *** \par \par Pathology: \par \par -Complex sclerosing lesion (radial scar) with focal peripheral \par micropapillomatous architecture. \par -Benign breast tissue with proliferative type fibrocystic changes including \par florid duct hyperplasia associated with collagenous spherulosis, stromal \par fibrosis, sclerosing and blunt duct adenosis, apocrine metaplasia-lined \par cysts, and microcalcifications. \par -Focal mammary duct ectasia. \par \par This is benign, high risk, concordant histology. Surgical excision is \par recommended. \par \par Findings and recommendations were discussed with the patient via telephone \par on 4/29/2020 utilizing  #[id number]. This was also discussed \par with Dr. Tiwari's office on 4/29/2020. \par \par \par \par *** END OF ADDENDUM 04/29/2020 *** \par \par \par \par PROCEDURE DATE: 04/28/2020 \par \par \par \par \par INTERPRETATION: MRI GUIDED BIOPSY OF THE RIGHT BREAST. \par \par CLINICAL INDICATION: Indeterminate enhancing mass. \par \par The procedure, its risks, benefits, and alternatives were explained to the \par patient stylizing a Jordanian interpeter, who expressed understanding and gave \par informed written and verbal consent. A time-out, which included patient?s \par full name, date of birth, description of the expected procedure and \par procedure site, was performed immediately before the procedure. \par \par TECHNIQUE: MR imaging of the right breast was performed using a 1.5 abhilash GE \par magnet, dedicated 8 channel breast coil and [employer] platform. 8 cc of \par intravenous Gadavist were administered. 2 cc were discarded. IV access was \par obtained on site. The examination was performed with a vitamin E capsule \par placed on the skin, and biopsy grid in place. Sequences include sagittal \par dynamic fat suppressed pre and post gadolinium gradient echo imaging. \par \par FINDINGS: \par \par Using the usual sterile technique and 1% lidocaine as superficial \par anesthesia, and lidocaine with epinephrine as deep anesthesia, a small \par dermatotomy was made in the skin. Under MR guidance, using a 9 gauge \par vacuum-assisted device, the previously described area in the superior aspect \par of the right breast was sampled, with 12 core biopsies obtained. Post biopsy \par imaging in the sagittal plane demonstrated a visible cavity at the biopsy \par site. A biopsy clip (cork shaped) was placed in the biopsy cavity. \par \par Hemostases was maintained with manual pressure. Sterile dressing was \par applied. \par \par The patient was then escorted[facility], with unilateral \par mammogram performed. Subsequent unilateral right mammogram was performed, \par demonstrating the biopsy clip to be appropriately positioned. \par \par The patient tolerated the procedure well as discharged home in stable \par condition, with written discharge instructions. \par \par IMPRESSION: \par \par Successful MR guided biopsy of right breast. \par \par Histopathology: Pending, to be reported in an addendum. \par \par \par \par ***Please see the addendum at the top of this report. It may contain \par additional important information or changes.**** \par \par \par ALENA SHELLEY M.D., RESIDENT RADIOLOGIST \par This document has been electronically signed. \par KAI ZAVALA M.D., ATTENDING RADIOLOGIST \par This document has been electronically signed. Apr 28 2020 11:32AM \par Addend: KAI ZAVALA M.D., ATTENDING RADIOLOGIST \par This addendum was electronically signed on: Apr 29 2020 1:57PM. \par \par \par EXAM: MG MAMMO DIAG W CRISTHIAN LT# \par \par \par PROCEDURE DATE: 04/30/2020 \par \par \par \par INTERPRETATION: Clinical History / Reason for exam: Left lumpectomy May \par 2019, follow up. \par \par The patient reports her last clinical breast examination was performed \par within the past year. \par \par Family history: None \par \par Comparisons: Priors dating back to 2017. \par \par Views obtained:Full field CC and MLO views of the left breast with \par tomosynthesis. Spot magnification views of the left breast.. \par \par Computer-aided detection was utilized in the interpretation of this \par examination. \par \par Breast composition:The breasts are heterogeneously dense, which may obscure \par small masses. \par \par Findings: \par \par Mammogram: \par \par Stable postsurgical distortion in the left breast upper outer quadrant \par consistent with prior lumpectomy. No suspicious mass, microcalcifications or \par areas of architectural distortion is seen either breast. When compared to \par the previous examinations there is no significant interval change and \par nothing to suggest malignancy. \par \par Impression: No mammographic evidence of malignancy. Stable postsurgical \par changes of the left breast. \par \par Recommendation: As per post lumpectomy routine, a follow-up bilateral \par mammogram is recommended. \par \par BI-RADS Category 2: Benign \par \par The above findings and recommendations were discussed with the patient at \par the time of the examination. \par \par \par \par \par \par \par EDY GRADY M.D., ATTENDING RADIOLOGIST \par This document has been electronically signed. Apr 30 2020 3:10PM

## 2020-06-04 NOTE — HISTORY OF PRESENT ILLNESS
[FreeTextEntry1] : Frances is a 49 premenopausal F who presents with a US detected L breast cancer, [id number], ER/MS positive, her 2 neg stage 1 breast cancer, s/p L WLE + SLN Bx on 6/3/19, s/p L breast re-excision on 19.\par \par She now presents with a R breast radial scar found on MRI. \par \par -oncotype DX 7\par -no chemotherapy offered\par -s/p L WBI on 19 - 19 \par -s/p tamoxifen since 10/1/19\par \par Her work up was as follows: \par 3/13/19 -- L dx tomosynthesis; b/l US \par RIGHT \par -@1N2, mass measuring 1.2 x 1.4 x 0.5 cm, no change, deemed BIRADS 3\par -@6N4, mass measuring 0.6 x 0.9 x 0.4 cm, no change, deemed BIRADS 3\par LEFT: \par -@12N2, cyst measuring 1 x 1 x 0.8 cm \par -@5N5, mass measuring 1.2 x 1.4 x 0.5 cm, no change, deemed BIRADS 3\par -@3-4N4, il defined mass, measuring 0.5 x 0.7 x 0.6 cm, mammographically occult, BIOPSY \par BIRADS 4 of above lesion\par \par 3/18/19 -- L US CNBx @3-4N4\par -micropscopic IDC, well differentiated\par -ER/MS positive, Her 2 pending (Abel )\par \par 19 -- breast MRI \par R: 6.7 cm nonmass enhancement in inferior R breast --> rec BIOPSY of anterior and posterior aspect \par L: lateral mid depth, irregular enhancing mass measuring 0.9 cm consistent with known cancer \par -b/l breast cysts \par -abnormal signal in anterior right lung base \par \par 19 -- CT Chest \par -3 mm solid nodule in RUL \par -rec: no further imaging if patient is low risk \par \par 19 -- R MRI guided biopsy x 2 \par -benign proliferative type FC changes x 2\par \par COLOR genetic testing \par -negative for any mutations \par \par 6/3/19 -- L WLE with SLN Bx\par -IDC, well differentiated, focal lobular features, SBR 1\par -T=9mm\par -EIC, low nuclear grade\par -invasive cancer extends to broadly involve inferior and medial margins\par -cLCIS, radial scar \par -0/5 SLN + for metastatic disease \par -ER/MS pos, Her 2 neg on IHC\par -ONCOTYPE score of 7\par \par 19 -- L breast mass re-excision \par -residual foci of IDC, 2mm, located 0.5 mm from new superior/anterior margin\par -extensive DCIS, low nuclear grade, 1 mm from medial/anterior margin and 1.5 mm from lateral margin \par \par HISTORICAL RISK FACTORS: \par -1 prior breast biopsy as above \par -no family history of breast or ovarian cancer \par - , age at first live birth was 24\par -no prior OCP use \par -COLOR genetic testing negative\par \par INTERVAL HISTORY: \par Frances is a 48 premenopausal F with left breast cancer.  She presents today for a 6 month follow up visit, s/p L WLE with SLN Bx on 6/3/19, s/p L breast mass re-excision on 19.  \par \par Her final pathology revealed a 9mm IDC, well differentiated, SBR 1, extensive DCIS that was low nuclear grade, however the medial and inferior margin were positive.  She had 5 LN removed which were all negative, and the tumor was ER/MS positive, Her 2 negative on IHC, oncotype RS 7. \par \par Her left breast re-excision revealed an additional 2mm of IDC, located 0.5 mm from her new superior/anterior margin and extensive DCIS, located 1 mm from her medial/anterior margin, and 1.5 mm from her lateral margin. \par \par -s/p L WBI completed on 19\par -started tamoxifen on 10/1/19\par \par She has occasional sharp pains and hyperpigmentation from her radiation therapy, but otherwise has not palpated any abnormal masses, denies any nipple discharge, and denies any right sided breast complaints. \par \par She is otherwise tolerating the tamoxifen.  She missed her menses this month, but has not had any abnormal vaginal spotting.  She also is experiencing hot flashes, but denies any shortness of breath, unilateral leg pain or swelling. \par \par She had the following work up since her last visit: \par 2020 -- breast MRI \par -R: @12, mid depth, 0.5 cm enhancing mass, appears indeterminate --> BIOPSY \par -R: 2 foci of susceptibility artifact consistent with prior benign biopsies in inferior R breast, multiple nonenhancing cysts\par -L: post surgical distortion consistent with post lumpectomy changes, no suspicious enhancement \par BIRADS 4\par \par 2020 -- R MRI Biopsy \par -radial scar \par \par 2020 -- L dx mammogram \par -stable post surgical distortion in UOQ, consistent with post lumpectomy changes \par -no suspicious mass, microcalcs, architectural distortion \par BIRADS 2\par \par Her last screening mammogram was performed on 10/29/19 which was deemed BIRADS 2 for stable benign b/l masses.

## 2020-07-02 ENCOUNTER — OUTPATIENT (OUTPATIENT)
Dept: OUTPATIENT SERVICES | Facility: HOSPITAL | Age: 50
LOS: 1 days | Discharge: HOME | End: 2020-07-02

## 2020-07-02 ENCOUNTER — APPOINTMENT (OUTPATIENT)
Dept: HEMATOLOGY ONCOLOGY | Facility: CLINIC | Age: 50
End: 2020-07-02
Payer: COMMERCIAL

## 2020-07-02 VITALS
SYSTOLIC BLOOD PRESSURE: 131 MMHG | HEART RATE: 60 BPM | DIASTOLIC BLOOD PRESSURE: 74 MMHG | WEIGHT: 172 LBS | HEIGHT: 60 IN | BODY MASS INDEX: 33.77 KG/M2 | TEMPERATURE: 98 F

## 2020-07-02 VITALS
TEMPERATURE: 98 F | BODY MASS INDEX: 34.68 KG/M2 | HEART RATE: 60 BPM | DIASTOLIC BLOOD PRESSURE: 74 MMHG | HEIGHT: 59 IN | SYSTOLIC BLOOD PRESSURE: 131 MMHG | WEIGHT: 172 LBS

## 2020-07-02 DIAGNOSIS — Z98.890 OTHER SPECIFIED POSTPROCEDURAL STATES: Chronic | ICD-10-CM

## 2020-07-02 PROCEDURE — 99214 OFFICE O/P EST MOD 30 MIN: CPT

## 2020-07-03 NOTE — END OF VISIT
[FreeTextEntry3] : I was physically present for the key portions of the evaluation and management service provided.  I agree with the history and physical, and plan which I have reviewed and edited where appropriate.\par  [FreeTextEntry2] : I was physically present for the key portions of the evaluation and management service provided.  I agree with the history and physical, and plan which I have reviewed and edited where appropriate.\par

## 2020-07-03 NOTE — PHYSICAL EXAM
[Fully active, able to carry on all pre-disease performance without restriction] : Status 0 - Fully active, able to carry on all pre-disease performance without restriction [Normal Male] : prostate smooth, symmetric with no modularity or induration [Normal] : normal appearance, no rash, nodules, vesicles, ulcers, erythema [de-identified] : Status post left lumpectomy and SLNB. The surgical scars are healing well. There is no palpable abnormality. ost radiation erythema noted to left breast, no tenderness to palpation,

## 2020-07-03 NOTE — ASSESSMENT
[FreeTextEntry1] : 47 yo premenopausal female has IDC of the left breast, stage IA ( [id number]), ER/[place] positive, Her 2 neg, G1, s/p lumpectomy and SLNB.\par RS 7, in the low risk range.\par \par Recommendation:\par -- Continue Tamoxifen daily.\par -- Followup with Dr. Tiwari for right breast excision on 7/14/2020.\par -- Will continue observation of stable 3 mm solid nodule in RUL from CT scan.\par -- b/l dx mammogram and left breast US as directed.\par -- Followup with PCP and GYN for health maintenance.\par -- RTO for followup in 6 months.\par \par Case was seen and discussed with Dr. Lozada who agreed with the assessment and plan.\par \par \par \par

## 2020-07-06 DIAGNOSIS — Z51.81 ENCOUNTER FOR THERAPEUTIC DRUG LEVEL MONITORING: ICD-10-CM

## 2020-07-06 DIAGNOSIS — C50.412 MALIGNANT NEOPLASM OF UPPER-OUTER QUADRANT OF LEFT FEMALE BREAST: ICD-10-CM

## 2020-07-06 DIAGNOSIS — L90.5 SCAR CONDITIONS AND FIBROSIS OF SKIN: ICD-10-CM

## 2020-07-07 ENCOUNTER — RESULT REVIEW (OUTPATIENT)
Age: 50
End: 2020-07-07

## 2020-07-07 ENCOUNTER — OUTPATIENT (OUTPATIENT)
Dept: OUTPATIENT SERVICES | Facility: HOSPITAL | Age: 50
LOS: 1 days | Discharge: HOME | End: 2020-07-07
Payer: MEDICAID

## 2020-07-07 VITALS
HEART RATE: 77 BPM | OXYGEN SATURATION: 96 % | WEIGHT: 174.17 LBS | TEMPERATURE: 98 F | SYSTOLIC BLOOD PRESSURE: 135 MMHG | HEIGHT: 61 IN | DIASTOLIC BLOOD PRESSURE: 70 MMHG | RESPIRATION RATE: 16 BRPM

## 2020-07-07 DIAGNOSIS — Z01.818 ENCOUNTER FOR OTHER PREPROCEDURAL EXAMINATION: ICD-10-CM

## 2020-07-07 DIAGNOSIS — L90.5 SCAR CONDITIONS AND FIBROSIS OF SKIN: ICD-10-CM

## 2020-07-07 DIAGNOSIS — Z98.890 OTHER SPECIFIED POSTPROCEDURAL STATES: Chronic | ICD-10-CM

## 2020-07-07 LAB
ALBUMIN SERPL ELPH-MCNC: 4.1 G/DL — SIGNIFICANT CHANGE UP (ref 3.5–5.2)
ALP SERPL-CCNC: 64 U/L — SIGNIFICANT CHANGE UP (ref 30–115)
ALT FLD-CCNC: 22 U/L — SIGNIFICANT CHANGE UP (ref 0–41)
ANION GAP SERPL CALC-SCNC: 14 MMOL/L — SIGNIFICANT CHANGE UP (ref 7–14)
APTT BLD: 27.7 SEC — SIGNIFICANT CHANGE UP (ref 27–39.2)
AST SERPL-CCNC: 18 U/L — SIGNIFICANT CHANGE UP (ref 0–41)
BASOPHILS # BLD AUTO: 0.03 K/UL — SIGNIFICANT CHANGE UP (ref 0–0.2)
BASOPHILS NFR BLD AUTO: 0.4 % — SIGNIFICANT CHANGE UP (ref 0–1)
BILIRUB SERPL-MCNC: 0.3 MG/DL — SIGNIFICANT CHANGE UP (ref 0.2–1.2)
BUN SERPL-MCNC: 19 MG/DL — SIGNIFICANT CHANGE UP (ref 10–20)
CALCIUM SERPL-MCNC: 9.1 MG/DL — SIGNIFICANT CHANGE UP (ref 8.5–10.1)
CHLORIDE SERPL-SCNC: 109 MMOL/L — SIGNIFICANT CHANGE UP (ref 98–110)
CO2 SERPL-SCNC: 20 MMOL/L — SIGNIFICANT CHANGE UP (ref 17–32)
CREAT SERPL-MCNC: 0.9 MG/DL — SIGNIFICANT CHANGE UP (ref 0.7–1.5)
EOSINOPHIL # BLD AUTO: 0.33 K/UL — SIGNIFICANT CHANGE UP (ref 0–0.7)
EOSINOPHIL NFR BLD AUTO: 4.3 % — SIGNIFICANT CHANGE UP (ref 0–8)
GLUCOSE SERPL-MCNC: 98 MG/DL — SIGNIFICANT CHANGE UP (ref 70–99)
HCT VFR BLD CALC: 36.5 % — LOW (ref 37–47)
HGB BLD-MCNC: 12.2 G/DL — SIGNIFICANT CHANGE UP (ref 12–16)
IMM GRANULOCYTES NFR BLD AUTO: 0.7 % — HIGH (ref 0.1–0.3)
INR BLD: 0.91 RATIO — SIGNIFICANT CHANGE UP (ref 0.65–1.3)
LYMPHOCYTES # BLD AUTO: 2.01 K/UL — SIGNIFICANT CHANGE UP (ref 1.2–3.4)
LYMPHOCYTES # BLD AUTO: 26.4 % — SIGNIFICANT CHANGE UP (ref 20.5–51.1)
MCHC RBC-ENTMCNC: 28.8 PG — SIGNIFICANT CHANGE UP (ref 27–31)
MCHC RBC-ENTMCNC: 33.4 G/DL — SIGNIFICANT CHANGE UP (ref 32–37)
MCV RBC AUTO: 86.3 FL — SIGNIFICANT CHANGE UP (ref 81–99)
MONOCYTES # BLD AUTO: 0.58 K/UL — SIGNIFICANT CHANGE UP (ref 0.1–0.6)
MONOCYTES NFR BLD AUTO: 7.6 % — SIGNIFICANT CHANGE UP (ref 1.7–9.3)
NEUTROPHILS # BLD AUTO: 4.6 K/UL — SIGNIFICANT CHANGE UP (ref 1.4–6.5)
NEUTROPHILS NFR BLD AUTO: 60.6 % — SIGNIFICANT CHANGE UP (ref 42.2–75.2)
NRBC # BLD: 0 /100 WBCS — SIGNIFICANT CHANGE UP (ref 0–0)
PLATELET # BLD AUTO: 169 K/UL — SIGNIFICANT CHANGE UP (ref 130–400)
POTASSIUM SERPL-MCNC: 4.3 MMOL/L — SIGNIFICANT CHANGE UP (ref 3.5–5)
POTASSIUM SERPL-SCNC: 4.3 MMOL/L — SIGNIFICANT CHANGE UP (ref 3.5–5)
PROT SERPL-MCNC: 7.1 G/DL — SIGNIFICANT CHANGE UP (ref 6–8)
PROTHROM AB SERPL-ACNC: 10.5 SEC — SIGNIFICANT CHANGE UP (ref 9.95–12.87)
RBC # BLD: 4.23 M/UL — SIGNIFICANT CHANGE UP (ref 4.2–5.4)
RBC # FLD: 13.1 % — SIGNIFICANT CHANGE UP (ref 11.5–14.5)
SODIUM SERPL-SCNC: 143 MMOL/L — SIGNIFICANT CHANGE UP (ref 135–146)
WBC # BLD: 7.6 K/UL — SIGNIFICANT CHANGE UP (ref 4.8–10.8)
WBC # FLD AUTO: 7.6 K/UL — SIGNIFICANT CHANGE UP (ref 4.8–10.8)

## 2020-07-07 PROCEDURE — 93010 ELECTROCARDIOGRAM REPORT: CPT

## 2020-07-07 PROCEDURE — 71046 X-RAY EXAM CHEST 2 VIEWS: CPT | Mod: 26

## 2020-07-07 NOTE — H&P PST ADULT - HISTORY OF PRESENT ILLNESS
Pt mostly Uzbek speaking. Via  services pt has h/o of breast cancer in left breast but recently discovered a benign mass in right breast which they would like to remove before it turns malignant. Denies any chest pain, difficulty breathing, SOB, palpitations, dysuria, URI, or any other infections in the last 2 weeks. Denies any recent travel, contact, or exposure to any persons with known or suspected COVID-19. Pt also denies COVID testing within the last 2 weeks. Exercise tolerance of 1 flights of stairs without dyspnea. CHRISTIAN reviewed with patient.

## 2020-07-07 NOTE — H&P PST ADULT - NSANTHNECKRD_ENT_A_CORE
From: Marco Caro  To: Sharda Dunne MD  Sent: 2017 7:33 AM CDT  Subject: Physical therapy    This message is being sent by Mary Caro on behalf of Marco Caro    Hi Dr Dunne,    I had a consultation for Royce at [facility] yesterday. They also took a look at Marco as well and wanted me to get a referral for a pediatric phyisical therapist. His plagiocephaly appears to more severe then Vinay which is being caused by torticollis in his neck. Please advise.    Thank you,  Mary Caro   No

## 2020-07-07 NOTE — H&P PST ADULT - REASON FOR ADMISSION
48 yo female presents for PAST in preparation for right breast wide local excision with radiofrequency localizer on 7/14/2020

## 2020-07-09 ENCOUNTER — OUTPATIENT (OUTPATIENT)
Dept: OUTPATIENT SERVICES | Facility: HOSPITAL | Age: 50
LOS: 1 days | Discharge: HOME | End: 2020-07-09
Payer: COMMERCIAL

## 2020-07-09 ENCOUNTER — RESULT REVIEW (OUTPATIENT)
Age: 50
End: 2020-07-09

## 2020-07-09 DIAGNOSIS — Z98.890 OTHER SPECIFIED POSTPROCEDURAL STATES: Chronic | ICD-10-CM

## 2020-07-09 PROCEDURE — 19281 PERQ DEVICE BREAST 1ST IMAG: CPT | Mod: RT

## 2020-07-11 ENCOUNTER — LABORATORY RESULT (OUTPATIENT)
Age: 50
End: 2020-07-11

## 2020-07-11 ENCOUNTER — OUTPATIENT (OUTPATIENT)
Dept: OUTPATIENT SERVICES | Facility: HOSPITAL | Age: 50
LOS: 1 days | Discharge: HOME | End: 2020-07-11

## 2020-07-11 DIAGNOSIS — Z98.890 OTHER SPECIFIED POSTPROCEDURAL STATES: Chronic | ICD-10-CM

## 2020-07-11 DIAGNOSIS — Z11.59 ENCOUNTER FOR SCREENING FOR OTHER VIRAL DISEASES: ICD-10-CM

## 2020-07-13 NOTE — ASU PATIENT PROFILE, ADULT - DOES PATIENT HAVE ADVANCE DIRECTIVE
Normal rate, regular rhythm.  Heart sounds S1, S2.  No murmurs, rubs or gallops. No/Information provided

## 2020-07-14 ENCOUNTER — RESULT REVIEW (OUTPATIENT)
Age: 50
End: 2020-07-14

## 2020-07-14 ENCOUNTER — APPOINTMENT (OUTPATIENT)
Dept: BREAST CENTER | Facility: AMBULATORY SURGERY CENTER | Age: 50
End: 2020-07-14
Payer: MEDICAID

## 2020-07-14 ENCOUNTER — OUTPATIENT (OUTPATIENT)
Dept: OUTPATIENT SERVICES | Facility: HOSPITAL | Age: 50
LOS: 1 days | Discharge: HOME | End: 2020-07-14
Payer: COMMERCIAL

## 2020-07-14 VITALS
OXYGEN SATURATION: 100 % | HEART RATE: 50 BPM | SYSTOLIC BLOOD PRESSURE: 136 MMHG | DIASTOLIC BLOOD PRESSURE: 63 MMHG | TEMPERATURE: 98 F | RESPIRATION RATE: 15 BRPM

## 2020-07-14 VITALS
WEIGHT: 174.17 LBS | TEMPERATURE: 99 F | SYSTOLIC BLOOD PRESSURE: 141 MMHG | HEART RATE: 64 BPM | OXYGEN SATURATION: 100 % | DIASTOLIC BLOOD PRESSURE: 67 MMHG | HEIGHT: 61 IN | RESPIRATION RATE: 20 BRPM

## 2020-07-14 DIAGNOSIS — Z98.890 OTHER SPECIFIED POSTPROCEDURAL STATES: Chronic | ICD-10-CM

## 2020-07-14 PROCEDURE — 19125 EXCISION BREAST LESION: CPT | Mod: RT

## 2020-07-14 PROCEDURE — 88305 TISSUE EXAM BY PATHOLOGIST: CPT | Mod: 26

## 2020-07-14 RX ORDER — TAMOXIFEN CITRATE 20 MG/1
0 TABLET, FILM COATED ORAL
Qty: 0 | Refills: 0 | DISCHARGE

## 2020-07-14 RX ORDER — SODIUM CHLORIDE 9 MG/ML
1000 INJECTION, SOLUTION INTRAVENOUS
Refills: 0 | Status: DISCONTINUED | OUTPATIENT
Start: 2020-07-14 | End: 2020-07-29

## 2020-07-14 RX ORDER — IBUPROFEN 200 MG
1 TABLET ORAL
Qty: 12 | Refills: 0
Start: 2020-07-14 | End: 2020-07-17

## 2020-07-14 RX ORDER — OXYCODONE AND ACETAMINOPHEN 5; 325 MG/1; MG/1
1 TABLET ORAL EVERY 4 HOURS
Refills: 0 | Status: DISCONTINUED | OUTPATIENT
Start: 2020-07-14 | End: 2020-07-14

## 2020-07-14 RX ADMIN — SODIUM CHLORIDE 100 MILLILITER(S): 9 INJECTION, SOLUTION INTRAVENOUS at 13:42

## 2020-07-14 NOTE — ASU DISCHARGE PLAN (ADULT/PEDIATRIC) - CARE PROVIDER_API CALL
Gerri Tiwari)  Surgery  [address] [place] [address]  Phone: [phone]  Fax: [phone]  Scheduled Appointment: 07/24/2020 11:30 AM

## 2020-07-14 NOTE — ASU DISCHARGE PLAN (ADULT/PEDIATRIC) - CALL YOUR DOCTOR IF YOU HAVE ANY OF THE FOLLOWING:
Nausea and vomiting that does not stop/Unable to urinate/Wound/Surgical Site with redness, or foul smelling discharge or pus/Increased irritability or sluggishness/Fever greater than (need to indicate Fahrenheit or Celsius)/Inability to tolerate liquids or foods/Excessive diarrhea/Pain not relieved by Medications/Bleeding that does not stop/Numbness, tingling, color or temperature change to extremity/Swelling that gets worse

## 2020-07-14 NOTE — BRIEF OPERATIVE NOTE - NSICDXBRIEFPREOP_GEN_ALL_CORE_FT
PRE-OP DIAGNOSIS:  Intraductal papilloma of right breast 14-Jul-2020 13:25:02  Gerri Tiwari PRE-OP DIAGNOSIS:  Radial scar of right breast 14-Jul-2020 13:28:39  Gerri Tiwari

## 2020-07-16 DIAGNOSIS — N63.10 UNSPECIFIED LUMP IN THE RIGHT BREAST, UNSPECIFIED QUADRANT: ICD-10-CM

## 2020-07-16 LAB — SURGICAL PATHOLOGY STUDY: SIGNIFICANT CHANGE UP

## 2020-07-18 DIAGNOSIS — D24.1 BENIGN NEOPLASM OF RIGHT BREAST: ICD-10-CM

## 2020-07-18 DIAGNOSIS — Z85.3 PERSONAL HISTORY OF MALIGNANT NEOPLASM OF BREAST: ICD-10-CM

## 2020-07-24 ENCOUNTER — APPOINTMENT (OUTPATIENT)
Dept: BREAST CENTER | Facility: CLINIC | Age: 50
End: 2020-07-24
Payer: MEDICAID

## 2020-07-24 VITALS
HEIGHT: 60 IN | WEIGHT: 172 LBS | DIASTOLIC BLOOD PRESSURE: 86 MMHG | SYSTOLIC BLOOD PRESSURE: 120 MMHG | BODY MASS INDEX: 33.77 KG/M2 | TEMPERATURE: 97.7 F

## 2020-07-24 PROCEDURE — 99024 POSTOP FOLLOW-UP VISIT: CPT

## 2020-07-30 NOTE — REVIEW OF SYSTEMS
[As Noted in HPI] : as noted in HPI [Hot Flashes] : hot flashes [Negative] : Heme/Lymph [Skin Wound] : skin wound [Fever] : no fever [Chills] : no chills [Skin Lesions] : no skin lesions [Abn Vaginal Bleeding] : no unexplained vaginal bleeding [Breast Pain] : no breast pain [Breast Lump] : no breast lump

## 2020-07-30 NOTE — DATA REVIEWED
[FreeTextEntry1] : [facility] Accession Number : [id number]\par \par REYESCARINO, EDITH                    2\par \par \par \par Surgical Final Report\par \par \par \par \par Final Diagnosis\par 1. Breast, right upper inner quadrant mass, radio frequency seed\par localized lumpectomy:\par - Radial sclerosing lesion (radial scar) located adjacent to a\par healing prior biopsy site.\par - Benign breast tissue with a hyalnized fibroadenoma and\par proliferative jaspal fibrocystic changes including florid duct\par hyperplasia, stromal fibrosis, sclerosing and blunt duct\par adenosis, apocrine metaplasia, columnar cell change without\par atypia, and microcalcifications.\par \par 2. Breast, right posterior margin, excision:\par - Benign breast tissue with proliferative type fibrocystic\par changes including focal florid duct hyperplasia, stromal\par fibrosis, sclerosing adenosis, and\par apocrine metaplasia-lined cysts.\par \par Verified by: Ray Butler M.D.\par (Electronic Signature)\par Reported on: 07/16/20 13:22 EDT, [facility],\[place] [address]\par Phone: [phone]   Fax: [phone]\par _________________________________________________________________\par \par Clinical History\par Right breast wide local excision with radio frequency\par localization\par COVID(-)\par \par Specimen(s) Submitted\par 1     Right breast mass\par Time obtained: 12:53 pm\par 2     Right breast posterior margin\par Time obtained: 12:57 pm\par \par Gross Description\par 1. The specimen is received fresh, labeled "left breast mass" and\par consists of a fragment of fibroadipose breast tissue, measuring\par 3.5 x 3.5 x 1.5 cm and weighing 8 gm.The specimen is inked as\par follows; anterior- red, posterior- black, superior- blue,\par inferior- green, medial- orange, lateral- yellow.  The specimen\par is serially sectioned from medial to lateral. The specimen is\par serially sectioned from superior to inferior to reveal a clot-\par filled biopsy cavity, measuring 0.4 x 0.2 x 0.4 cm, which is 0.6\par cm from the medial margin and 0.5 cm from the lateral margin. The\par remainder of the specimen is yellow adipose tissue admixed with\par white fibrous breast tissue. The specimen is submitted entirely.\par (8 blocks)\par \par \par \par \par \par REYESCARINO, EDITH                    2\par \par \par \par Surgical Final Report\par \par \par \par \par \par 2. The specimen is received fresh, labeled "right breast\par posterior margin" and consists of a fragment of fibroadipose\par breast tissue, measuring 2 x 1.7 x 0.5 cm and weighing 2 gm. It\par is oriented by suture, indicating the new posterior margin. The\par new posterior margin is inked black. The specimen is serially\par sectioned to reveal unremarkable yellow adipose tissue. The\par specimen is submitted entirely. (3 blocks)\par \par Specimen was received and underwent gross examination at [facility], [address] [place] [address],\[place] [address].\par \par 07/15/20 06:07 jl\par \par Perioperative Diagnosis\par Right rdial scar\par

## 2020-07-30 NOTE — HISTORY OF PRESENT ILLNESS
[FreeTextEntry1] : Frances is a 49 premenopausal F who presents with a US detected L breast cancer, [id number], ER/IL positive, her 2 neg stage 1 breast cancer, s/p L WLE + SLN Bx on 6/3/19, s/p L breast re-excision on 19.\par \par She now presents with a R breast radial scar found on MRI, s/p R WLE on 2020.\par \par -oncotype DX 7\par -no chemotherapy offered\par -s/p L WBI on 19 - 19 \par -s/p tamoxifen since 10/1/19\par \par 2020 -- L WLE \par -radial scar, no atypia \par \par Her work up was as follows: \par 3/13/19 -- L dx tomosynthesis; b/l US \par RIGHT \par -@1N2, mass measuring 1.2 x 1.4 x 0.5 cm, no change, deemed BIRADS 3\par -@6N4, mass measuring 0.6 x 0.9 x 0.4 cm, no change, deemed BIRADS 3\par LEFT: \par -@12N2, cyst measuring 1 x 1 x 0.8 cm \par -@5N5, mass measuring 1.2 x 1.4 x 0.5 cm, no change, deemed BIRADS 3\par -@3-4N4, il defined mass, measuring 0.5 x 0.7 x 0.6 cm, mammographically occult, BIOPSY \par BIRADS 4 of above lesion\par \par 3/18/19 -- L US CNBx @3-4N4\par -micropscopic IDC, well differentiated\par -ER/IL positive, Her 2 pending (Abel )\par \par 19 -- breast MRI \par R: 6.7 cm nonmass enhancement in inferior R breast --> rec BIOPSY of anterior and posterior aspect \par L: lateral mid depth, irregular enhancing mass measuring 0.9 cm consistent with known cancer \par -b/l breast cysts \par -abnormal signal in anterior right lung base \par \par 19 -- CT Chest \par -3 mm solid nodule in RUL \par -rec: no further imaging if patient is low risk \par \par 19 -- R MRI guided biopsy x 2 \par -benign proliferative type FC changes x 2\par \par COLOR genetic testing \par -negative for any mutations \par \par 6/3/19 -- L WLE with SLN Bx\par -IDC, well differentiated, focal lobular features, SBR 1\par -T=9mm\par -EIC, low nuclear grade\par -invasive cancer extends to broadly involve inferior and medial margins\par -cLCIS, radial scar \par -0/5 SLN + for metastatic disease \par -ER/IL pos, Her 2 neg on IHC\par -ONCOTYPE score of 7\par \par 19 -- L breast mass re-excision \par -residual foci of IDC, 2mm, located 0.5 mm from new superior/anterior margin\par -extensive DCIS, low nuclear grade, 1 mm from medial/anterior margin and 1.5 mm from lateral margin \par \par HISTORICAL RISK FACTORS: \par -1 prior breast biopsy as above \par -no family history of breast or ovarian cancer \par - , age at first live birth was 24\par -no prior OCP use \par -COLOR genetic testing negative\par \par INTERVAL HISTORY: \par Frances is a 48 premenopausal F with left breast cancer.  She presents today for a 6 month follow up visit, s/p L WLE with SLN Bx on 6/3/19, s/p L breast mass re-excision on 19.  \par \par Her final pathology revealed a 9mm IDC, well differentiated, SBR 1, extensive DCIS that was low nuclear grade, however the medial and inferior margin were positive.  She had 5 LN removed which were all negative, and the tumor was ER/IL positive, Her 2 negative on IHC, oncotype RS 7. \par \par Her left breast re-excision revealed an additional 2mm of IDC, located 0.5 mm from her new superior/anterior margin and extensive DCIS, located 1 mm from her medial/anterior margin, and 1.5 mm from her lateral margin. \par \par -s/p L WBI completed on 19\par -started tamoxifen on 10/1/19\par \par She has occasional sharp pains and hyperpigmentation from her radiation therapy, but otherwise has not palpated any abnormal masses, denies any nipple discharge, and denies any right sided breast complaints. \par \par She is otherwise tolerating the tamoxifen.  She missed her menses this month, but has not had any abnormal vaginal spotting.  She also is experiencing hot flashes, but denies any shortness of breath, unilateral leg pain or swelling. \par \par She had the following work up since her last visit: \par 2020 -- breast MRI \par -R: @12, mid depth, 0.5 cm enhancing mass, appears indeterminate --> BIOPSY \par -R: 2 foci of susceptibility artifact consistent with prior benign biopsies in inferior R breast, multiple nonenhancing cysts\par -L: post surgical distortion consistent with post lumpectomy changes, no suspicious enhancement \par BIRADS 4\par \par 2020 -- R MRI Biopsy \par -radial scar \par \par 2020 -- L dx mammogram \par -stable post surgical distortion in UOQ, consistent with post lumpectomy changes \par -no suspicious mass, microcalcs, architectural distortion \par BIRADS 2\par \par Her last screening mammogram was performed on 10/29/19 which was deemed BIRADS 2 for stable benign b/l masses. \par \par INTERVAL HISTORY: \par Frances is a 49 F with left breast cancer, right breast radial scar, s/p R WLE for her radial scar on 2020. \par She returns for her FIRST POST OP VISIT. \par \par She is healing well from her recent surgery.  She denies any pain at her surgical site, denies any redness, drainage, fevers or chills. \par \par Her final pathology revealed a radial scar without atypia.

## 2020-07-30 NOTE — PHYSICAL EXAM
[Normocephalic] : normocephalic [Atraumatic] : atraumatic [EOMI] : extra ocular movement intact [No Supraclavicular Adenopathy] : no supraclavicular adenopathy [No Cervical Adenopathy] : no cervical adenopathy [No dominant masses] : no dominant masses in right breast  [No dominant masses] : no dominant masses left breast [No Nipple Retraction] : no left nipple retraction [No Nipple Discharge] : no left nipple discharge [No Axillary Lymphadenopathy] : no left axillary lymphadenopathy [Soft] : abdomen soft [Not Tender] : non-tender [No Edema] : no edema [No Rashes] : no rashes [No Ulceration] : no ulceration [Examined in the supine and seated position] : examined in the supine and seated position [de-identified] : two surgical incisions are well healed; dense scar tissue in the area of her lumpectomy, improved since her last visit, but no suspicious masses are palpated; she does have hyperpigmentation from her history of radiation therapy  [de-identified] : surgical incision is healing well with dermabond still in place, no erythema, induration, no significant seroma or hematoma formation

## 2020-09-10 LAB
25(OH)D3 SERPL-MCNC: 16 NG/ML
ALBUMIN SERPL ELPH-MCNC: 4.3 G/DL
ALP BLD-CCNC: 71 U/L
ALT SERPL-CCNC: 17 U/L
ANION GAP SERPL CALC-SCNC: 14 MMOL/L
AST SERPL-CCNC: 14 U/L
BASOPHILS # BLD AUTO: 0.06 K/UL
BASOPHILS NFR BLD AUTO: 0.6 %
BILIRUB SERPL-MCNC: 0.2 MG/DL
BUN SERPL-MCNC: 28 MG/DL
CALCIUM SERPL-MCNC: 9.4 MG/DL
CHLORIDE SERPL-SCNC: 107 MMOL/L
CHOLEST SERPL-MCNC: 168 MG/DL
CHOLEST/HDLC SERPL: 2.8 RATIO
CO2 SERPL-SCNC: 20 MMOL/L
CREAT SERPL-MCNC: 1.1 MG/DL
EOSINOPHIL # BLD AUTO: 0.42 K/UL
EOSINOPHIL NFR BLD AUTO: 4.5 %
ESTIMATED AVERAGE GLUCOSE: 117 MG/DL
GLUCOSE SERPL-MCNC: 108 MG/DL
HBA1C MFR BLD HPLC: 5.7 %
HCT VFR BLD CALC: 39 %
HDLC SERPL-MCNC: 60 MG/DL
HGB BLD-MCNC: 12.6 G/DL
IMM GRANULOCYTES NFR BLD AUTO: 0.6 %
LDLC SERPL CALC-MCNC: 86 MG/DL
LYMPHOCYTES # BLD AUTO: 2.36 K/UL
LYMPHOCYTES NFR BLD AUTO: 25.5 %
MAN DIFF?: NORMAL
MCHC RBC-ENTMCNC: 28.2 PG
MCHC RBC-ENTMCNC: 32.3 G/DL
MCV RBC AUTO: 87.2 FL
MONOCYTES # BLD AUTO: 0.74 K/UL
MONOCYTES NFR BLD AUTO: 8 %
NEUTROPHILS # BLD AUTO: 5.61 K/UL
NEUTROPHILS NFR BLD AUTO: 60.8 %
PLATELET # BLD AUTO: 193 K/UL
POTASSIUM SERPL-SCNC: 4.6 MMOL/L
PROT SERPL-MCNC: 7.1 G/DL
RBC # BLD: 4.47 M/UL
RBC # FLD: 13 %
SODIUM SERPL-SCNC: 141 MMOL/L
TRIGL SERPL-MCNC: 177 MG/DL
TSH SERPL-ACNC: 4.1 UIU/ML
WBC # FLD AUTO: 9.25 K/UL

## 2020-10-23 ENCOUNTER — APPOINTMENT (OUTPATIENT)
Dept: RADIATION ONCOLOGY | Facility: HOSPITAL | Age: 50
End: 2020-10-23
Payer: MEDICAID

## 2020-10-23 VITALS
TEMPERATURE: 98.1 F | RESPIRATION RATE: 14 BRPM | BODY MASS INDEX: 33.59 KG/M2 | HEART RATE: 55 BPM | DIASTOLIC BLOOD PRESSURE: 64 MMHG | SYSTOLIC BLOOD PRESSURE: 139 MMHG | WEIGHT: 172 LBS

## 2020-10-23 PROCEDURE — 99213 OFFICE O/P EST LOW 20 MIN: CPT

## 2020-10-23 NOTE — DISEASE MANAGEMENT
[Pathological] : TNM Stage: p [IA] : IA [FreeTextEntry4] : Invasive ductal carcinoma of the left breast, well differentiated, ER positive, UT positive, HER 2 negative,  [TTNM] : 1b [NTNM] : 0 [MTNM] : 0 [de-identified] : 9603 [de-identified] : 8076 [de-identified] : left breast

## 2020-10-23 NOTE — REVIEW OF SYSTEMS
[Negative] : Neurological [Fever] : no fever [Chills] : no chills [Night Sweats] : no night sweats [Fatigue] : no fatigue [Chest Pain] : no chest pain [Palpitations] : no palpitations [Lower Ext Edema] : no lower extremity edema [Shortness Of Breath] : no shortness of breath [Hot Flashes] : no hot flashes

## 2020-10-23 NOTE — LETTER CLOSING
[Consult Closing:] : Thank you for allowing me to participate in the care of this patient.  If you have any questions, please do not hesitate to contact me. [Sincerely yours,] : Sincerely yours, [FreeTextEntry3] : Arielle Borja M.D. \par \par Electronically proofread and signed by:  Arielle Borja MD\par Attending, Department of Radiation Medicine\par [facility]\par \par CC: Dr. Chung Lozada

## 2020-10-23 NOTE — HISTORY OF PRESENT ILLNESS
[FreeTextEntry1] : \par EDITH REYESCARINO is evaluated for follow up   As you know, EDITH REYESCARINO is a 50 year old woman with invasive ductal carcinoma of the left breast, well differentiated, ER positive, TN positive, HER 2 negative, [id number], Stage IA.  She is s/p breast conserving surgery.  She received left breast radiation to a dose of 5240cGy from 8/20/19 through 9/17/19.  I last saw her via telehealth visit in April, 2020  In the interim, she has done well.  She continues on endocrine therapy (Tamoxifen).  She has no breast pain.  The dry desquamation on the treated breast has resolved.  She underwent biopsy for right breast lesion found on MRI on 7/14/2020 with Dr. Tiwari and pathology was benign. \par She is scheduled for bilateral Mammo/US Breast in November, ordered by Dr. Tiwari. \par Dr. Morgan is following a RUL node (last CT 10/17/2019- RUL  3 mm ).  \par Dr. Lozada apt scheduled 1/4/2021.

## 2020-10-23 NOTE — PHYSICAL EXAM
[Obese] : obese [Hearing Threshold Finger Rub Not [place]] : hearing was normal [Heart Rate And Rhythm] : heart rate and rhythm were normal [Heart Sounds] : normal S1 and S2 [Murmurs] : no murmurs present [Edema] : no peripheral edema present [Symmetric] : breasts are symmetric [Breast Abnormal Lactation (Galactorrhea)] : no nipple discharge [No UE Edema] : there is no upper extremity edema [Breast Palpation Mass] : no palpable masses [Abdomen Soft] : soft [Abdomen Tenderness] : non-tender [Normal] : oriented to person, place and time, the affect was normal, the mood was normal and not anxious [de-identified] : She is examined in both the upright and supine positions.  The right breast is unremarkable.   The  left breast scar is well healed s/p lumpectomy. Left mild hyperpigmentation in IM fold.  No palpable mass in either breast.

## 2020-11-10 ENCOUNTER — RESULT REVIEW (OUTPATIENT)
Age: 50
End: 2020-11-10

## 2020-11-10 ENCOUNTER — OUTPATIENT (OUTPATIENT)
Dept: OUTPATIENT SERVICES | Facility: HOSPITAL | Age: 50
LOS: 1 days | Discharge: HOME | End: 2020-11-10
Payer: COMMERCIAL

## 2020-11-10 DIAGNOSIS — R92.8 OTHER ABNORMAL AND INCONCLUSIVE FINDINGS ON DIAGNOSTIC IMAGING OF BREAST: ICD-10-CM

## 2020-11-10 DIAGNOSIS — Z98.890 OTHER SPECIFIED POSTPROCEDURAL STATES: Chronic | ICD-10-CM

## 2020-11-10 PROCEDURE — G0279: CPT | Mod: 26

## 2020-11-10 PROCEDURE — 77066 DX MAMMO INCL CAD BI: CPT | Mod: 26

## 2020-11-10 PROCEDURE — 76641 ULTRASOUND BREAST COMPLETE: CPT | Mod: 26,50

## 2020-11-19 ENCOUNTER — NON-APPOINTMENT (OUTPATIENT)
Age: 50
End: 2020-11-19

## 2020-11-24 ENCOUNTER — APPOINTMENT (OUTPATIENT)
Dept: BREAST CENTER | Facility: CLINIC | Age: 50
End: 2020-11-24
Payer: MEDICAID

## 2020-11-24 VITALS
SYSTOLIC BLOOD PRESSURE: 132 MMHG | BODY MASS INDEX: 33.77 KG/M2 | WEIGHT: 172 LBS | DIASTOLIC BLOOD PRESSURE: 80 MMHG | HEIGHT: 60 IN | TEMPERATURE: 97.1 F

## 2020-11-24 PROCEDURE — 99213 OFFICE O/P EST LOW 20 MIN: CPT

## 2020-11-24 NOTE — HISTORY OF PRESENT ILLNESS
[FreeTextEntry1] : Frances is a 50 premenopausal F who presents with a US detected L breast cancer, [id number], ER/CO positive, her 2 neg stage 1 breast cancer, s/p L WLE + SLN Bx on 6/3/19, s/p L breast re-excision on 19.\par \par She now presents with a R breast radial scar found on MRI, s/p R WLE on 2020.\par \par -oncotype DX 7\par -no chemotherapy offered\par -s/p L WBI on 19 - 19 \par -s/p tamoxifen since 10/1/19\par \par 2020 -- L WLE \par -radial scar, no atypia \par \par Her work up was as follows: \par 3/13/19 -- L dx tomosynthesis; b/l US \par RIGHT \par -@1N2, mass measuring 1.2 x 1.4 x 0.5 cm, no change, deemed BIRADS 3\par -@6N4, mass measuring 0.6 x 0.9 x 0.4 cm, no change, deemed BIRADS 3\par LEFT: \par -@12N2, cyst measuring 1 x 1 x 0.8 cm \par -@5N5, mass measuring 1.2 x 1.4 x 0.5 cm, no change, deemed BIRADS 3\par -@3-4N4, il defined mass, measuring 0.5 x 0.7 x 0.6 cm, mammographically occult, BIOPSY \par BIRADS 4 of above lesion\par \par 3/18/19 -- L US CNBx @3-4N4\par -micropscopic IDC, well differentiated\par -ER/CO positive, Her 2 pending (Abel )\par \par 19 -- breast MRI \par R: 6.7 cm nonmass enhancement in inferior R breast --> rec BIOPSY of anterior and posterior aspect \par L: lateral mid depth, irregular enhancing mass measuring 0.9 cm consistent with known cancer \par -b/l breast cysts \par -abnormal signal in anterior right lung base \par \par 19 -- CT Chest \par -3 mm solid nodule in RUL \par -rec: no further imaging if patient is low risk \par \par 19 -- R MRI guided biopsy x 2 \par -benign proliferative type FC changes x 2\par \par COLOR genetic testing \par -negative for any mutations \par \par 6/3/19 -- L WLE with SLN Bx\par -IDC, well differentiated, focal lobular features, SBR 1\par -T=9mm\par -EIC, low nuclear grade\par -invasive cancer extends to broadly involve inferior and medial margins\par -cLCIS, radial scar \par -0/5 SLN + for metastatic disease \par -ER/CO pos, Her 2 neg on IHC\par -ONCOTYPE score of 7\par \par 19 -- L breast mass re-excision \par -residual foci of IDC, 2mm, located 0.5 mm from new superior/anterior margin\par -extensive DCIS, low nuclear grade, 1 mm from medial/anterior margin and 1.5 mm from lateral margin \par \par HISTORICAL RISK FACTORS: \par -1 prior breast biopsy as above \par -no family history of breast or ovarian cancer \par - , age at first live birth was 24\par -no prior OCP use \par -COLOR genetic testing negative\par \par INTERVAL HISTORY: \par Frances is a 48 premenopausal F with left breast cancer.  She presents today for a 6 month follow up visit, s/p L WLE with SLN Bx on 6/3/19, s/p L breast mass re-excision on 19.  \par \par Her final pathology revealed a 9mm IDC, well differentiated, SBR 1, extensive DCIS that was low nuclear grade, however the medial and inferior margin were positive.  She had 5 LN removed which were all negative, and the tumor was ER/CO positive, Her 2 negative on IHC, oncotype RS 7. \par \par Her left breast re-excision revealed an additional 2mm of IDC, located 0.5 mm from her new superior/anterior margin and extensive DCIS, located 1 mm from her medial/anterior margin, and 1.5 mm from her lateral margin. \par \par -s/p L WBI completed on 19\par -started tamoxifen on 10/1/19\par \par She has occasional sharp pains and hyperpigmentation from her radiation therapy, but otherwise has not palpated any abnormal masses, denies any nipple discharge, and denies any right sided breast complaints. \par \par She is otherwise tolerating the tamoxifen.  She missed her menses this month, but has not had any abnormal vaginal spotting.  She also is experiencing hot flashes, but denies any shortness of breath, unilateral leg pain or swelling. \par \par She had the following work up since her last visit: \par 2020 -- breast MRI \par -R: @12, mid depth, 0.5 cm enhancing mass, appears indeterminate --> BIOPSY \par -R: 2 foci of susceptibility artifact consistent with prior benign biopsies in inferior R breast, multiple nonenhancing cysts\par -L: post surgical distortion consistent with post lumpectomy changes, no suspicious enhancement \par BIRADS 4\par \par 2020 -- R MRI Biopsy \par -radial scar \par \par 2020 -- L dx mammogram \par -stable post surgical distortion in UOQ, consistent with post lumpectomy changes \par -no suspicious mass, microcalcs, architectural distortion \par BIRADS 2\par \par Her last screening mammogram was performed on 10/29/19 which was deemed BIRADS 2 for stable benign b/l masses. \par \par INTERVAL HISTORY: \carlos Daniels is a 50 F with left breast cancer, right breast radial scar, s/p R WLE for her radial scar on 2020. \par Her final pathology revealed a radial scar without atypia.  \par \par 2020 \carlos Daniels returns for a short term follow up visit  for left breast cancer treated in 2019.  She denies any breast related complaints at this time.  She denies any breast pain, nipple discharge or retraction, and has not palpated any new breast masses. \par \par She is still taking the tamoxifen with minimal side effects, however the interval between her menses is increasing.  \par \par Her most recent imaging was a b/l dx mammogram and US on 11/10/2020 which revealed b/l post surgical changes, and a hypoechoic area in her right breast, retroareolar area at her surgical site, measuring 1.7 x 1.3 cm, for which a 6 month follow up US could be considered, deemed BIRADS 3.

## 2020-11-24 NOTE — PHYSICAL EXAM
[Normocephalic] : normocephalic [Atraumatic] : atraumatic [EOMI] : extra ocular movement intact [No Supraclavicular Adenopathy] : no supraclavicular adenopathy [No Cervical Adenopathy] : no cervical adenopathy [Examined in the supine and seated position] : examined in the supine and seated position [No dominant masses] : no dominant masses in right breast  [No dominant masses] : no dominant masses left breast [No Nipple Retraction] : no left nipple retraction [No Nipple Discharge] : no left nipple discharge [No Axillary Lymphadenopathy] : no left axillary lymphadenopathy [Soft] : abdomen soft [Not Tender] : non-tender [No Edema] : no edema [No Rashes] : no rashes [No Ulceration] : no ulceration [de-identified] : surgical incision is well healed, no other suspicious masses palpated  [de-identified] : two surgical incisions are well healed; dense scar tissue in the area of her lumpectomy, improved since her last visit, but no suspicious masses are palpated; she does have hyperpigmentation from her history of radiation therapy

## 2020-11-24 NOTE — DATA REVIEWED
[FreeTextEntry1] : EXAM: MG US BREAST COMPLETE BI\par EXAM: MG MAMMO DIAG W CRISTHIAN BI#\par \par \par PROCEDURE DATE: 11/10/2020\par \par \par \par INTERPRETATION: Clinical History / Reason for exam: 50 years old patient for short interval follow-up as well as bilateral yearly diagnostic mammogram and ultrasound. Status post left lumpectomy in June 2019 and right breast excisional biopsy in July 2020 with diagnosis of radial scar, initially diagnosed on MRI guided biopsy.\par \par History of additional prior right breast MRI guided benign biopsies in the past.\par \par Last CBE: Within the past month\par \par FAMILY HISTORY OF BREAST CANCER: None\par \par TECHNIQUE: Bilateral full field digital mammography including Tomosynthesis in CC and MLO projections. Magnification images of the left breast lumpectomy site in CC and 90 degrees projections CAD was also utilized.\par \par COMPARISON: 7/9/2020, 4/30/2020 through\par \par BREAST COMPOSITION: The breasts are heterogeneously dense, which may obscure small masses.\par \par FINDINGS:\par \par MAMMOGRAM:\par There is a stable area of retraction and distortion with increased density in the left upper outer quadrant, the lumpectomy site, stable.\par \par There is interval excision of the biopsy marker from the right superior central breast. Postsurgical distortion is seen in the region. Two biopsy markers are present in the right inferior medial breast, the sites of prior biopsies.\par \par No new mass or suspicious microcalcifications are seen on either side.\par \par Skin, nipples and subcutaneous tissues are unremarkable.\par \par BREAST ULTRASOUND:\par Complete bilateral breast ultrasound was performed.\par \par Comparison: 10/29/2019 and 9/12/2018\par \par The background parenchymal echotexture is heterogeneous.\par \par There is an approximately 17 x 13 mm hypoechoic area underlying the skin scar at the right surgical site, corresponding to 12:00 axis retroareolar region. There is no associated vascularity in the region.\par \par The previously seen mass at the right 1:00 axis, 2 cm from the nipple is no longer identified.\par \par Stable postsurgical changes are seen at the left lumpectomy site, corresponding to 3-4 o'clock axis.\par \par No new discrete suspicious solid or cystic mass is seen. There is no axillary lymphadenopathy.\par \par IMPRESSION:\par \par Right breast mammogram demonstrates postsurgical changes and probably benign sonographic hypoechoic area/fat necrosis at the surgical site. A follow-up targeted ultrasound in 6 months can be considered.\par \par Stable benign post lumpectomy changes on the left side. Short-term follow-up left breast mammogram as per lumpectomy protocol in 6 months is suggested.\par \par No specific evidence of malignancy.\par \par \par Recommendation: Follow-up unilateral diagnostic mammogram and ultrasound in 6 months.\par \par BI-RADS category 3: Probably Benign\par \par The findings and recommendations were discussed with the patient prior to departure.\par \par \par \par \par RODNEY VITAL MD; Attending Radiologist\par This document has been electronically signed. Nov 10 2020 4:53PM

## 2020-11-24 NOTE — ASSESSMENT
[FreeTextEntry1] : Frances is a 50 premenopausal F with an US detected L breast cancer, [id number], ER/ID positive, her 2 neg stage 1 breast cancer, s/p L WLE + SLN Bx on 6/3/19, s/p L breast mass re-excision on 7/8/19 with negative margins. stage 1 A breast cancer; now with RIGHT breast radial scar, detected on breast MRI, s/p R WLE on 7/14/2020, final pathology revealing a radial scar. \par \par -s/p  L WLE and SLN Bx on 6/3/19\par --final pathology revealed positive inferior and medial margins\par \par s/p L breast re-excision on 7/8/19\par -final pathology revealed negative surgical margins\par \par ONCOTYPE RS 7\par -no chemotherapy \par \par s/p L WBI, completed on 9/12/19\par s/p tamoxifen, started on 10/1/19\par \par 7/14/2020 -- R WLE \par -radial scar, no atypia \par \par On exam, I was not able to palpate any suspicious abnormalities within either breast.  She did have dense scarring at her L lumpectomy site, however.  \par \par Her most recent imaging was a b/l dx mammogram and US on 11/10/2020 which revealed b/l post surgical changes, and a hypoechoic area in her right breast, retroareolar area at her surgical site, measuring 1.7 x 1.3 cm, for which a 6 month follow up US could be considered, deemed BIRADS 3. \par \par She will be due for a L dx mammogram and US on 5/10/2021.  She will also be due for a breast MRI on 4/21/21.  I will have her follow up after for studies. \par \par AS REVIEW: \par We discussed radial scars without atypia.  These lesions are considered fibroproliferative lesions without atypia.  Patients with these lesions were found to have a slightly increased relative risk compared to the reference population.  However the lesions themselves do not have any malignant potential. There is about a 10-20% upgrade rate to a high risk lesion (including atypical hyperplasias) and a <3% upgrade to cancer.  However, when atypia is present there is up to a 15-25% upgrade rate to cancer.  For this reason, we have discussed observation vs. surgical excision of this lesion.  I have recommended that we move forward with surgical excision at this time.\par \par AS REVIEW:\par Her final surgical margins were negative, although she did have widespread DCIS which had close margins, but not positive.  There is no indication for further surgical excision at this time. \par \par She underwent a breast MRI which revealed nonmass enhancement spanning 6.7 cm in her inferior RIGHT breast which was biopsy proven benign proliferative type fibrocystic changes.  No additional abnormalities were noted in her left breast.  We will repeat the MRI 4/19/2020.\par \par She also had a chest CT which revealed a 3 mm solid nodule in her RUL for which no further imaging was indicated as she is a low risk patient.  However, per Dr. Lozada's last note, she will be scheduled for a repeat CT Chest. \par \par SHe also underwent COLOR genetic panel testing which was negative for any mutations. \par \par All of her questions were answered.  She knows to call with any further questions or concerns.\par \par PLAN\par -f/up with Dr. Morgan regarding her RUL nodule, due for follow up CT Chest \par -breast MRI 4/21/21\par -L dx mammogram and US on 5/10/2021\par -f/up after

## 2021-01-04 ENCOUNTER — APPOINTMENT (OUTPATIENT)
Dept: HEMATOLOGY ONCOLOGY | Facility: CLINIC | Age: 51
End: 2021-01-04
Payer: MEDICAID

## 2021-01-04 VITALS
DIASTOLIC BLOOD PRESSURE: 82 MMHG | HEIGHT: 60 IN | SYSTOLIC BLOOD PRESSURE: 128 MMHG | HEART RATE: 53 BPM | TEMPERATURE: 96.8 F | BODY MASS INDEX: 35.34 KG/M2 | WEIGHT: 180 LBS

## 2021-01-04 PROCEDURE — 99214 OFFICE O/P EST MOD 30 MIN: CPT

## 2021-01-04 NOTE — ASSESSMENT
[FreeTextEntry1] : 47 yo premenopausal female has IDC of the left breast, stage IA ( [id number]), ER/AZ positive, Her 2 neg, G1, s/p lumpectomy and SLNB.\par RS 7, in the low risk range.\par \par Recommendation:\par -- Continue Tamoxifen daily.\par -- Will order repeat CT chest without contrast to followup 3 mm solid nodule in RUL.\par -- L dx mammogram and US on 5/10/2021. She will also be due for a breast MRI on 4/21/21. \par -- Followup with PCP and GYN for health maintenance.\par -- RTO for followup in 6 months.\par \par Case was seen and discussed with Dr. Lozada who agreed with the assessment and plan.\par \par \par \par

## 2021-01-04 NOTE — PHYSICAL EXAM
[Fully active, able to carry on all pre-disease performance without restriction] : Status 0 - Fully active, able to carry on all pre-disease performance without restriction [Normal Male] : prostate smooth, symmetric with no modularity or induration [Normal] : normal appearance, no rash, nodules, vesicles, ulcers, erythema [de-identified] : Status post left lumpectomy and SLNB. The surgical scars are healing well. There is no palpable abnormality. post radiation erythema noted to left breast, no tenderness to palpation. S/p right breast excision

## 2021-01-04 NOTE — HISTORY OF PRESENT ILLNESS
[de-identified] : 48 yr old female is referred by Dr. Tiwari for consultation of adjuvant systemic therapy for newly diagnosed right breast cancer. The patient felt a palpable lump in her left breast  and underwent diagnostic workup.  \par \par Bilateral dx mammo and US on 3/13/19 revealed right breast mass  - @1N2, mass measuring 1.2 x 1.4 x 0.5 cm, no change, deemed BIRADS 3 -@6N4, mass measuring 0.6 x 0.9 x 0.4 cm, no change, deemed BIRADS 3. LEFT breast @12N2, cyst measuring 1 x 1 x 0.8 cm - @5N5, mass measuring 1.2 x 1.4 x 0.5 cm, no change, deemed BIRADS 3 -@3-4N4, ill defined mass, measuring 0.5 x 0.7 x 0.6 cm, mammographically occult, BIOPSY recommended for BIRADS 4 of above lesion\par \par On 3/18/19, she underwent a left US CNBx @3-4N4 which revealed microscopic IDC, well differentiated ER/GA positive, Her 2 negative\par \par On 19, b/l breast MRI showed Right breast 6.7 cm nonmass enhancement in inferior R breast --> rec BIOPSY of anterior and posterior aspect, and left breast lateral mid depth, irregular enhancing mass measuring 0.9 cm consistent with known cancer \par \par 19 -- CT Chest: 3 mm solid nodule in RUL \par \par 19 -- R MRI guided biopsy x 2 \par -benign proliferative type FC changes x 2\par \par The patient had COLOR genetic testing. She is negative for any mutations.\par \par On 6/3/19, she underwent left WLE with SLN Bx. The pathology showed 9 mm IDC, well differentiated, focal lobular features, low nuclear grade, invasive cancer extends to broadly involve inferior and medial margins. There was cLCIS, radial scar. 5 SLNs were negative for malignancy.  The invasive tumor was ER/GA pos 80%, Her 2 neg on IHC, Ki 67 3-5%.\par \par The surgical specimen was sent for Oncotype dx analysis. her RS is 7, in the low risk range and predicting 3% risk of distant recurrence with endocrine therapy alone.\par \par The patient is previously healthy, no PMH, no family history of breast cancer or other cancers. She is , age at first live birth was 24. No prior OCP use.\par She denies smoking or drinking. She is up to date on pap smear and colonoscopy . Her LMP was on 2019. \par \par She is here to discuss systemic adjuvant therapy. She is able to speak English and understands conversation well. We tried to use  service but there was connection problem.\par \par \par \par \par \par \par \par  [de-identified] : \par 10/3/19\par Patient is here today for follow up visit.  She offers no complaints She completed radiation #21 on 9/17/19 and started Tamoxifen 10/1/19.  She is tolerating Tamoxifen so far.  She continues to get monthly menses. She has a follow up appt with Dr. Tiwari on 10/16/19.\par Previous labs and scans reviewed.  She is due to have a CT Chest for re-evaluation of a 3 mm solid nodule RUL.\par \par 1/2/20:\par Patient is here today for follow up visit.  She completed adjuvant WBRT in 9/2019. She has been taking Tamoxifen since 10/1/19.  She is tolerating Tamoxifen well.  \par She had repeat dx mammo and US in 10/2019 which showed stable architectural distortion most consistent with postsurgical change. Two benign right breast masses and one benign left breast mass as above. No evidence of malignancy. As per post lumpectomy routine, a follow-up unilateral left mammogram is recommended. \par She also had CT chest in 10/2019 which showed stable 3 mm solid nodule RUL. There was no other abnormal finding. She is feeling well and has no breast related symptoms.\par \par 7/2/2020\par 50 yo female is here for follow up visit for h/o  IDC of the left breast, stage IA ( [id number]), ER/DC positive, Her 2 neg, G1, s/p lumpectomy and SLNB. She has been taking Tamoxifen 10/2019 and tolerating it well. She does not c/o any breast pain or lumps.  On 4/28/2020, she had MRI guided biopsy which showed complex sclerosing lesion (radial scar) with focal peripheral micropapillomatous architecture.  She is scheduled for a surgical excision of right breast on 7/14/2020.  She has regular menstrual periods.  LMP was 6/2020.\par Mammogram: 4/30/2020 shows:\par Stable postsurgical distortion in the left breast upper outer quadrant consistent with prior lumpectomy. No suspicious mass, microcalcifications or areas of architectural distortion is seen either breast. When compared to the previous examinations there is no significant interval change and nothing to suggest malignancy. \par Impression: No mammographic evidence of malignancy. Stable postsurgical changes of the left breast. \par MRI breasts 4/21/2020 shows:\par Postsurgical changes of the left breast, consistent with lumpectomy changes.  At the 12:00 axis of the right breast, there is a 0.5 cm indeterminate \par enhancing mass for which MRI guided biopsy is recommended. Recommendation: MRI guided biopsy. \par MRI Right breast bx: 4/28/2020 pathology shows:\par -Complex sclerosing lesion (radial scar) with focal peripheral micropapillomatous architecture. \par -Benign breast tissue with proliferative type fibrocystic changes including florid duct hyperplasia associated with collagenous spherulosis, stromal \par fibrosis, sclerosing and blunt duct adenosis, apocrine metaplasia-lined cysts, and microcalcifications. \par -Focal mammary duct ectasia. \par This is benign, high risk, concordant histology. Surgical excision is recommended. \par \par 01/04/2021\par Patient is here today for follow up visit h/o IDC of the left breast, stage IA ( [id number]), ER/DC positive, Her 2 neg, G1, s/p lumpectomy and SLNB. She has been taking Tamoxifen since 10/1/19. She is tolerating Tamoxifen well.  On 4/28/2020, she had MRI guided biopsy which showed complex sclerosing lesion (radial scar) with focal peripheral micropapillomatous architecture.  She is s/p R WLE on 7/14/2020, final pathology revealing a radial scar, no atypia. Her most recent imaging was a b/l dx mammogram and US on 11/10/2020 which revealed b/l post surgical changes, and a hypoechoic area in her right breast, retroareolar area at her surgical site, measuring 1.7 x 1.3 cm, for which a 6 month follow up US could be considered, deemed BIRADS 3. She will be due for a L dx mammogram and US on 5/10/2021. She will also be due for a breast MRI on 4/21/21. \par Last LMP was 10/2020. \par \par \par \par

## 2021-01-04 NOTE — RESULTS/DATA
[FreeTextEntry1] : \par  Mammo Diagnostic Digital w/ Cristhian, Bilateral             Final\par \par No Documents Attached\par \par \par \par \par   EXAM:  MG US BREAST COMPLETE BI\par EXAM:  MG MAMMO DIAG W CRISTHIAN BI#\par \par \par PROCEDURE DATE:  11/10/2020\par \par \par \par INTERPRETATION:  Clinical History / Reason for exam:  50 years old patient for short interval follow-up as well as bilateral yearly diagnostic mammogram and ultrasound. Status post left lumpectomy in June 2019 and right breast excisional biopsy in July 2020 with diagnosis of radial scar, initially diagnosed on MRI guided biopsy.\par \par History of additional prior right breast MRI guided benign biopsies in the past.\par \par Last CBE: Within the past month\par \par FAMILY HISTORY OF BREAST CANCER: None\par \par TECHNIQUE: Bilateral  full field digital mammography including Tomosynthesis in CC and MLO projections. Magnification images of the left breast lumpectomy site in CC and 90 degrees projections CAD was also utilized.\par \par COMPARISON: 7/9/2020, 4/30/2020 through\par \par BREAST COMPOSITION: The breasts are heterogeneously dense, which may obscure small masses.\par \par FINDINGS:\par \par MAMMOGRAM:\par There is a stable area of retraction and distortion with increased density in the left upper outer quadrant, the lumpectomy site, stable.\par \par There is interval excision of the biopsy marker from the right superior central breast. Postsurgical distortion is seen in the region. Two biopsy markers are present in the right inferior medial breast, the sites of prior biopsies.\par \par No new mass or suspicious microcalcifications are seen on either side.\par \par Skin, nipples and subcutaneous tissues are unremarkable.\par \par BREAST ULTRASOUND:\par Complete bilateral breast ultrasound was performed.\par \par Comparison: 10/29/2019 and 9/12/2018\par \par The background parenchymal echotexture is heterogeneous.\par \par There is an approximately 17 x 13 mm hypoechoic area underlying the skin scar at the right surgical site, corresponding to 12:00 axis retroareolar region. There is no associated vascularity in the region.\par \par The previously seen mass at the right 1:00 axis, 2 cm from the nipple is no longer identified.\par \par Stable postsurgical changes are seen at the left lumpectomy site, corresponding to 3-4 o'clock axis.\par \par No new discrete suspicious solid or cystic mass is seen. There is no axillary lymphadenopathy.\par \par IMPRESSION:\par \par Right breast mammogram demonstrates postsurgical changes and probably benign sonographic hypoechoic area/fat necrosis at the surgical site. A follow-up targeted ultrasound in 6 months can be considered.\par \par Stable benign post lumpectomy changes on the left side. Short-term follow-up left breast mammogram as per lumpectomy protocol in 6 months is suggested.\par \par No specific evidence of malignancy.\par \par \par Recommendation: Follow-up unilateral diagnostic mammogram and ultrasound in 6 months.\par \par BI-RADS category 3: Probably Benign\par \par The findings and recommendations were discussed with the patient prior to departure.\par \par

## 2021-01-23 ENCOUNTER — RESULT REVIEW (OUTPATIENT)
Age: 51
End: 2021-01-23

## 2021-01-23 ENCOUNTER — OUTPATIENT (OUTPATIENT)
Dept: OUTPATIENT SERVICES | Facility: HOSPITAL | Age: 51
LOS: 1 days | Discharge: HOME | End: 2021-01-23
Payer: MEDICAID

## 2021-01-23 DIAGNOSIS — Z98.890 OTHER SPECIFIED POSTPROCEDURAL STATES: Chronic | ICD-10-CM

## 2021-01-23 DIAGNOSIS — R91.1 SOLITARY PULMONARY NODULE: ICD-10-CM

## 2021-01-23 PROCEDURE — 71250 CT THORAX DX C-: CPT | Mod: 26

## 2021-04-16 ENCOUNTER — RESULT REVIEW (OUTPATIENT)
Age: 51
End: 2021-04-16

## 2021-04-16 ENCOUNTER — OUTPATIENT (OUTPATIENT)
Dept: OUTPATIENT SERVICES | Facility: HOSPITAL | Age: 51
LOS: 1 days | Discharge: HOME | End: 2021-04-16
Payer: COMMERCIAL

## 2021-04-16 DIAGNOSIS — Z98.890 OTHER SPECIFIED POSTPROCEDURAL STATES: Chronic | ICD-10-CM

## 2021-04-16 DIAGNOSIS — C50.412 MALIGNANT NEOPLASM OF UPPER-OUTER QUADRANT OF LEFT FEMALE BREAST: ICD-10-CM

## 2021-04-16 DIAGNOSIS — N64.89 OTHER SPECIFIED DISORDERS OF BREAST: ICD-10-CM

## 2021-04-16 DIAGNOSIS — R92.8 OTHER ABNORMAL AND INCONCLUSIVE FINDINGS ON DIAGNOSTIC IMAGING OF BREAST: ICD-10-CM

## 2021-04-16 PROCEDURE — 77049 MRI BREAST C-+ W/CAD BI: CPT | Mod: 26

## 2021-05-11 ENCOUNTER — RESULT REVIEW (OUTPATIENT)
Age: 51
End: 2021-05-11

## 2021-05-11 ENCOUNTER — OUTPATIENT (OUTPATIENT)
Dept: OUTPATIENT SERVICES | Facility: HOSPITAL | Age: 51
LOS: 1 days | Discharge: HOME | End: 2021-05-11
Payer: COMMERCIAL

## 2021-05-11 DIAGNOSIS — R92.8 OTHER ABNORMAL AND INCONCLUSIVE FINDINGS ON DIAGNOSTIC IMAGING OF BREAST: ICD-10-CM

## 2021-05-11 DIAGNOSIS — Z98.890 OTHER SPECIFIED POSTPROCEDURAL STATES: Chronic | ICD-10-CM

## 2021-05-11 PROCEDURE — G0279: CPT | Mod: 26

## 2021-05-11 PROCEDURE — 76642 ULTRASOUND BREAST LIMITED: CPT | Mod: 26,RT

## 2021-05-11 PROCEDURE — 77065 DX MAMMO INCL CAD UNI: CPT | Mod: 26,LT

## 2021-07-08 ENCOUNTER — APPOINTMENT (OUTPATIENT)
Dept: HEMATOLOGY ONCOLOGY | Facility: CLINIC | Age: 51
End: 2021-07-08
Payer: MEDICAID

## 2021-07-08 VITALS
HEART RATE: 57 BPM | BODY MASS INDEX: 35.81 KG/M2 | DIASTOLIC BLOOD PRESSURE: 69 MMHG | SYSTOLIC BLOOD PRESSURE: 121 MMHG | WEIGHT: 180 LBS | HEIGHT: 59.5 IN | TEMPERATURE: 98 F

## 2021-07-08 DIAGNOSIS — R91.1 SOLITARY PULMONARY NODULE: ICD-10-CM

## 2021-07-08 PROCEDURE — 99214 OFFICE O/P EST MOD 30 MIN: CPT

## 2021-07-08 NOTE — HISTORY OF PRESENT ILLNESS
[de-identified] : 48 yr old female is referred by Dr. Tiwari for consultation of adjuvant systemic therapy for newly diagnosed right breast cancer. The patient felt a palpable lump in her left breast  and underwent diagnostic workup.  \par \par Bilateral dx mammo and US on 3/13/19 revealed right breast mass  - @1N2, mass measuring 1.2 x 1.4 x 0.5 cm, no change, deemed BIRADS 3 -@6N4, mass measuring 0.6 x 0.9 x 0.4 cm, no change, deemed BIRADS 3. LEFT breast @12N2, cyst measuring 1 x 1 x 0.8 cm - @5N5, mass measuring 1.2 x 1.4 x 0.5 cm, no change, deemed BIRADS 3 -@3-4N4, ill defined mass, measuring 0.5 x 0.7 x 0.6 cm, mammographically occult, BIOPSY recommended for BIRADS 4 of above lesion\par \par On 3/18/19, she underwent a left US CNBx @3-4N4 which revealed microscopic IDC, well differentiated ER/NC positive, Her 2 negative\par \par On 19, b/l breast MRI showed Right breast 6.7 cm nonmass enhancement in inferior R breast --> rec BIOPSY of anterior and posterior aspect, and left breast lateral mid depth, irregular enhancing mass measuring 0.9 cm consistent with known cancer \par \par 19 -- CT Chest: 3 mm solid nodule in RUL \par \par 19 -- R MRI guided biopsy x 2 \par -benign proliferative type FC changes x 2\par \par The patient had COLOR genetic testing. She is negative for any mutations.\par \par On 6/3/19, she underwent left WLE with SLN Bx. The pathology showed 9 mm IDC, well differentiated, focal lobular features, low nuclear grade, invasive cancer extends to broadly involve inferior and medial margins. There was cLCIS, radial scar. 5 SLNs were negative for malignancy.  The invasive tumor was ER/NC pos 80%, Her 2 neg on IHC, Ki 67 3-5%.\par \par The surgical specimen was sent for Oncotype dx analysis. her RS is 7, in the low risk range and predicting 3% risk of distant recurrence with endocrine therapy alone.\par \par The patient is previously healthy, no PMH, no family history of breast cancer or other cancers. She is , age at first live birth was 24. No prior OCP use.\par She denies smoking or drinking. She is up to date on pap smear and colonoscopy . Her LMP was on 2019. \par \par She is here to discuss systemic adjuvant therapy. She is able to speak English and understands conversation well. We tried to use  service but there was connection problem.\par \par \par \par \par \par \par \par  [de-identified] : \par 10/3/19\par Patient is here today for follow up visit.  She offers no complaints She completed radiation #21 on 9/17/19 and started Tamoxifen 10/1/19.  She is tolerating Tamoxifen so far.  She continues to get monthly menses. She has a follow up appt with Dr. Tiwari on 10/16/19.\par Previous labs and scans reviewed.  She is due to have a CT Chest for re-evaluation of a 3 mm solid nodule RUL.\par \par 1/2/20:\par Patient is here today for follow up visit.  She completed adjuvant WBRT in 9/2019. She has been taking Tamoxifen since 10/1/19.  She is tolerating Tamoxifen well.  \par She had repeat dx mammo and US in 10/2019 which showed stable architectural distortion most consistent with postsurgical change. Two benign right breast masses and one benign left breast mass as above. No evidence of malignancy. As per post lumpectomy routine, a follow-up unilateral left mammogram is recommended. \par She also had CT chest in 10/2019 which showed stable 3 mm solid nodule RUL. There was no other abnormal finding. She is feeling well and has no breast related symptoms.\par \par 7/2/2020\par 48 yo female is here for follow up visit for h/o  IDC of the left breast, stage IA ( [id number]), ER/VT positive, Her 2 neg, G1, s/p lumpectomy and SLNB. She has been taking Tamoxifen 10/2019 and tolerating it well. She does not c/o any breast pain or lumps.  On 4/28/2020, she had MRI guided biopsy which showed complex sclerosing lesion (radial scar) with focal peripheral micropapillomatous architecture.  She is scheduled for a surgical excision of right breast on 7/14/2020.  She has regular menstrual periods.  LMP was 6/2020.\par Mammogram: 4/30/2020 shows:\par Stable postsurgical distortion in the left breast upper outer quadrant consistent with prior lumpectomy. No suspicious mass, microcalcifications or areas of architectural distortion is seen either breast. When compared to the previous examinations there is no significant interval change and nothing to suggest malignancy. \par Impression: No mammographic evidence of malignancy. Stable postsurgical changes of the left breast. \par MRI breasts 4/21/2020 shows:\par Postsurgical changes of the left breast, consistent with lumpectomy changes.  At the 12:00 axis of the right breast, there is a 0.5 cm indeterminate \par enhancing mass for which MRI guided biopsy is recommended. Recommendation: MRI guided biopsy. \par MRI Right breast bx: 4/28/2020 pathology shows:\par -Complex sclerosing lesion (radial scar) with focal peripheral micropapillomatous architecture. \par -Benign breast tissue with proliferative type fibrocystic changes including florid duct hyperplasia associated with collagenous spherulosis, stromal \par fibrosis, sclerosing and blunt duct adenosis, apocrine metaplasia-lined cysts, and microcalcifications. \par -Focal mammary duct ectasia. \par This is benign, high risk, concordant histology. Surgical excision is recommended. \par \par 01/04/2021\par Patient is here today for follow up visit h/o IDC of the left breast, stage IA ( [id number]), ER/VT positive, Her 2 neg, G1, s/p lumpectomy and SLNB. She has been taking Tamoxifen since 10/1/19. She is tolerating Tamoxifen well.  On 4/28/2020, she had MRI guided biopsy which showed complex sclerosing lesion (radial scar) with focal peripheral micropapillomatous architecture.  She is s/p R WLE on 7/14/2020, final pathology revealing a radial scar, no atypia. Her most recent imaging was a b/l dx mammogram and US on 11/10/2020 which revealed b/l post surgical changes, and a hypoechoic area in her right breast, retroareolar area at her surgical site, measuring 1.7 x 1.3 cm, for which a 6 month follow up US could be considered, deemed BIRADS 3. She will be due for a L dx mammogram and US on 5/10/2021. She will also be due for a breast MRI on 4/21/21. \par Last LMP was 10/2020. \par \par 7/8/21:\par Patient is here today for follow up visit. She has stage IA ( [id number]) IDC of the left breast, ER/VT positive, Her 2 neg, G1, s/p lumpectomy and SLNB. She has been taking Tamoxifen since 10/1/19. She is tolerating Tamoxifen well.  She had screening MRI in 4/2021 which did not show suspicious finding. On 5/11/21, she had left breast dx mammo and US which showed stable postsurgical changes at the lumpectomy site in superior left breast and no new suspicious mass, microcalcifications or areas of architectural distortion seen in the left breast. The annual screening mammography recommended. She still has her periods.

## 2021-07-08 NOTE — PHYSICAL EXAM
[Fully active, able to carry on all pre-disease performance without restriction] : Status 0 - Fully active, able to carry on all pre-disease performance without restriction [Normal Male] : prostate smooth, symmetric with no modularity or induration [Normal] : normal appearance, no rash, nodules, vesicles, ulcers, erythema [de-identified] : Status post left lumpectomy and SLNB. The surgical scars are healing well. There is no palpable abnormality. post radiation erythema noted to left breast, no tenderness to palpation. S/p right breast excision

## 2021-07-19 ENCOUNTER — OUTPATIENT (OUTPATIENT)
Dept: OUTPATIENT SERVICES | Facility: HOSPITAL | Age: 51
LOS: 1 days | Discharge: HOME | End: 2021-07-19

## 2021-07-19 DIAGNOSIS — C50.412 MALIGNANT NEOPLASM OF UPPER-OUTER QUADRANT OF LEFT FEMALE BREAST: ICD-10-CM

## 2021-07-19 DIAGNOSIS — R91.1 SOLITARY PULMONARY NODULE: ICD-10-CM

## 2021-07-19 DIAGNOSIS — Z98.890 OTHER SPECIFIED POSTPROCEDURAL STATES: Chronic | ICD-10-CM

## 2021-07-19 DIAGNOSIS — Z51.81 ENCOUNTER FOR THERAPEUTIC DRUG LEVEL MONITORING: ICD-10-CM

## 2021-07-27 PROBLEM — Z00.00 ENCOUNTER FOR PREVENTIVE HEALTH EXAMINATION: Noted: 2021-07-27

## 2021-10-22 ENCOUNTER — OUTPATIENT (OUTPATIENT)
Dept: OUTPATIENT SERVICES | Facility: HOSPITAL | Age: 51
LOS: 1 days | Discharge: HOME | End: 2021-10-22

## 2021-10-22 ENCOUNTER — APPOINTMENT (OUTPATIENT)
Dept: RADIATION ONCOLOGY | Facility: HOSPITAL | Age: 51
End: 2021-10-22
Payer: MEDICAID

## 2021-10-22 VITALS
TEMPERATURE: 97.7 F | SYSTOLIC BLOOD PRESSURE: 143 MMHG | HEIGHT: 59 IN | RESPIRATION RATE: 12 BRPM | WEIGHT: 175.06 LBS | BODY MASS INDEX: 35.29 KG/M2 | HEART RATE: 59 BPM | DIASTOLIC BLOOD PRESSURE: 67 MMHG | OXYGEN SATURATION: 96 %

## 2021-10-22 DIAGNOSIS — C50.512 MALIGNANT NEOPLASM OF LOWER-OUTER QUADRANT OF LEFT FEMALE BREAST: ICD-10-CM

## 2021-10-22 DIAGNOSIS — Z98.890 OTHER SPECIFIED POSTPROCEDURAL STATES: Chronic | ICD-10-CM

## 2021-10-22 PROCEDURE — 99213 OFFICE O/P EST LOW 20 MIN: CPT

## 2021-10-22 NOTE — REVIEW OF SYSTEMS
[Negative] : Psychiatric [Dysphagia] : no dysphagia [Vaginal Discharge] : no vaginal discharge [Dysmenorrhea/Abn Vaginal Bleeding] : no dysmenorrhea/abnormal vaginal bleeding [Hot Flashes] : no hot flashes

## 2021-10-22 NOTE — DISEASE MANAGEMENT
[Pathological] : TNM Stage: p [IA] : IA [FreeTextEntry4] : Invasive ductal carcinoma of the left breast, well differentiated, ER positive, ND positive, HER 2 negative,  [TTNM] : 1b [NTNM] : 0 [MTNM] : 0 [de-identified] : left breast

## 2021-10-22 NOTE — PHYSICAL EXAM
[Sclera] : the sclera and conjunctiva were normal [Hearing Threshold Finger Rub Not [place]] : hearing was normal [] : no respiratory distress [Respiration, Rhythm And Depth] : normal respiratory rhythm and effort [Exaggerated Use Of Accessory Muscles For Inspiration] : no accessory muscle use [Heart Rate And Rhythm] : heart rate and rhythm were normal [Heart Sounds] : normal S1 and S2 [Murmurs] : no murmurs present [No Discharge] : no discharge [___] : a [unfilled] ~Ucm mastectomy scar [Cervical Lymph Nodes Enlarged Posterior Bilaterally] : posterior cervical [Cervical Lymph Nodes Enlarged Anterior Bilaterally] : anterior cervical [Supraclavicular Lymph Nodes Enlarged Bilaterally] : supraclavicular [Axillary Lymph Nodes Enlarged Bilaterally] : axillary [No Focal Deficits] : no focal deficits [Edema] : no peripheral edema present [Breast Palpation Mass] : no palpable masses [Abdomen Soft] : soft [Nondistended] : nondistended [Abdomen Tenderness] : non-tender [Normal] : no palpable adenopathy [de-identified] : She is examined in both the upright and supine positions.  The right breast is unremarkable.   The left breast has a well healed scar.  No palpable mass in either breast.

## 2021-10-22 NOTE — HISTORY OF PRESENT ILLNESS
[FreeTextEntry1] : \par EDITH REYESCARINO is evaluated for follow up   As you know, EDITH REYESCARINO is a 51 year old woman with invasive ductal carcinoma of the left breast, well differentiated, ER positive, DC positive, HER 2 negative, [id number], Stage IA.  She is s/p breast conserving surgery.  She received left breast radiation to a dose of 5240cGy from 8/20/19 through 9/17/19.  I lsaw her last in October, 2020.   In the interim, she has done well.  She continues on endocrine therapy (Tamoxifen) and tolerating it well.  She denies breast pain.  She offers no new concerns.  \par \par On 5/11/2021, B/L  breast mammo/US shows there is redemonstration of stable postsurgical changes at the lumpectomy site in superior left breast.No new suspicious mass, microcalcifications or areas of architectural distortion seen in the left breast.\par Targeted unilateral right breast ultrasound was performed. There is interval reduction in size of prior noted scar/postsurgical change at the surgical site in the retroareolar region of the right breast 12:00. Impression: No mammographic or sonographic evidence of malignancy. Postsurgical changes in bilateral breasts.  BI-RADS Category 2: Benign\par \par 4/16/2021 MRI b/l breast shows postsurgical changes of the left breast, consistent with lumpectomy changes. No evidence of abnormal enhancement in either breast.  Slightly prominent left axillary nodes are most compatible with recent vaccination.  \par BI-RADS Category 2: Benign\par \par follow f/u for  RUL node (last CT Chest 1/23/2021- RUL  3 mm  unchanged).  No new or growing suspicious pulmonary nodule. \par Dr. Lozada apt scheduled 1/4/2022\par Dr. Tiwari 11/18/2021.

## 2021-10-22 NOTE — LETTER CLOSING
[Consult Closing:] : Thank you for allowing me to participate in the care of this patient.  If you have any questions, please do not hesitate to contact me. [Sincerely yours,] : Sincerely yours, [FreeTextEntry3] : Arielle Borja M.D. \par \par Electronically proofread and signed by:  Arielle Borja MD\par Attending, Department of Radiation Medicine\par [facility]\par \par CC: Dr. Chung Lozada

## 2021-11-18 ENCOUNTER — APPOINTMENT (OUTPATIENT)
Dept: BREAST CENTER | Facility: CLINIC | Age: 51
End: 2021-11-18
Payer: MEDICAID

## 2021-11-18 ENCOUNTER — NON-APPOINTMENT (OUTPATIENT)
Age: 51
End: 2021-11-18

## 2021-11-18 VITALS
DIASTOLIC BLOOD PRESSURE: 76 MMHG | WEIGHT: 175 LBS | TEMPERATURE: 98 F | HEIGHT: 59 IN | BODY MASS INDEX: 35.28 KG/M2 | SYSTOLIC BLOOD PRESSURE: 150 MMHG

## 2021-11-18 PROCEDURE — 99213 OFFICE O/P EST LOW 20 MIN: CPT

## 2021-11-18 NOTE — HISTORY OF PRESENT ILLNESS
[FreeTextEntry1] : Frances is a 50 premenopausal F who presents with a US detected L breast cancer, [id number], ER/DE positive, her 2 neg stage 1 breast cancer, s/p L WLE + SLN Bx on 6/3/19, s/p L breast re-excision on 19.\par \par She now presents with a R breast radial scar found on MRI, s/p R WLE on 2020.\par \par -oncotype DX 7\par -no chemotherapy offered\par -s/p L WBI on 19 - 19 \par -s/p tamoxifen since 10/1/19\par \par 2020 -- L WLE \par -radial scar, no atypia \par \par Her work up was as follows: \par 3/13/19 -- L dx tomosynthesis; b/l US \par RIGHT \par -@1N2, mass measuring 1.2 x 1.4 x 0.5 cm, no change, deemed BIRADS 3\par -@6N4, mass measuring 0.6 x 0.9 x 0.4 cm, no change, deemed BIRADS 3\par LEFT: \par -@12N2, cyst measuring 1 x 1 x 0.8 cm \par -@5N5, mass measuring 1.2 x 1.4 x 0.5 cm, no change, deemed BIRADS 3\par -@3-4N4, il defined mass, measuring 0.5 x 0.7 x 0.6 cm, mammographically occult, BIOPSY \par BIRADS 4 of above lesion\par \par 3/18/19 -- L US CNBx @3-4N4\par -micropscopic IDC, well differentiated\par -ER/DE positive, Her 2 pending (Abel )\par \par 19 -- breast MRI \par R: 6.7 cm nonmass enhancement in inferior R breast --> rec BIOPSY of anterior and posterior aspect \par L: lateral mid depth, irregular enhancing mass measuring 0.9 cm consistent with known cancer \par -b/l breast cysts \par -abnormal signal in anterior right lung base \par \par 19 -- CT Chest \par -3 mm solid nodule in RUL \par -rec: no further imaging if patient is low risk \par \par 19 -- R MRI guided biopsy x 2 \par -benign proliferative type FC changes x 2\par \par COLOR genetic testing \par -negative for any mutations \par \par 6/3/19 -- L WLE with SLN Bx\par -IDC, well differentiated, focal lobular features, SBR 1\par -T=9mm\par -EIC, low nuclear grade\par -invasive cancer extends to broadly involve inferior and medial margins\par -cLCIS, radial scar \par -0/5 SLN + for metastatic disease \par -ER/DE pos, Her 2 neg on IHC\par -ONCOTYPE score of 7\par \par 19 -- L breast mass re-excision \par -residual foci of IDC, 2mm, located 0.5 mm from new superior/anterior margin\par -extensive DCIS, low nuclear grade, 1 mm from medial/anterior margin and 1.5 mm from lateral margin \par \par HISTORICAL RISK FACTORS: \par -1 prior breast biopsy as above \par -no family history of breast or ovarian cancer \par - , age at first live birth was 24\par -no prior OCP use \par -COLOR genetic testing negative\par \par INTERVAL HISTORY: \par Frances is a 48 premenopausal F with left breast cancer.  She presents today for a 6 month follow up visit, s/p L WLE with SLN Bx on 6/3/19, s/p L breast mass re-excision on 19.  \par \par Her final pathology revealed a 9mm IDC, well differentiated, SBR 1, extensive DCIS that was low nuclear grade, however the medial and inferior margin were positive.  She had 5 LN removed which were all negative, and the tumor was ER/DE positive, Her 2 negative on IHC, oncotype RS 7. \par \par Her left breast re-excision revealed an additional 2mm of IDC, located 0.5 mm from her new superior/anterior margin and extensive DCIS, located 1 mm from her medial/anterior margin, and 1.5 mm from her lateral margin. \par \par -s/p L WBI completed on 19\par -started tamoxifen on 10/1/19\par \par She has occasional sharp pains and hyperpigmentation from her radiation therapy, but otherwise has not palpated any abnormal masses, denies any nipple discharge, and denies any right sided breast complaints. \par \par She is otherwise tolerating the tamoxifen.  She missed her menses this month, but has not had any abnormal vaginal spotting.  She also is experiencing hot flashes, but denies any shortness of breath, unilateral leg pain or swelling. \par \par She had the following work up since her last visit: \par 2020 -- breast MRI \par -R: @12, mid depth, 0.5 cm enhancing mass, appears indeterminate --> BIOPSY \par -R: 2 foci of susceptibility artifact consistent with prior benign biopsies in inferior R breast, multiple nonenhancing cysts\par -L: post surgical distortion consistent with post lumpectomy changes, no suspicious enhancement \par BIRADS 4\par \par 2020 -- R MRI Biopsy \par -radial scar \par \par 2020 -- L dx mammogram \par -stable post surgical distortion in UOQ, consistent with post lumpectomy changes \par -no suspicious mass, microcalcs, architectural distortion \par BIRADS 2\par \par Her last screening mammogram was performed on 10/29/19 which was deemed BIRADS 2 for stable benign b/l masses. \par \par INTERVAL HISTORY: \carlos Daniels is a 50 F with left breast cancer, right breast radial scar, s/p R WLE for her radial scar on 2020. \par Her final pathology revealed a radial scar without atypia.  \par \par 2020 \carlos Daniels returns for a short term follow up visit  for left breast cancer treated in 2019.  She denies any breast related complaints at this time.  She denies any breast pain, nipple discharge or retraction, and has not palpated any new breast masses. \par \par She is still taking the tamoxifen with minimal side effects, however the interval between her menses is increasing.  \par \par Her most recent imaging was a b/l dx mammogram and US on 11/10/2020 which revealed b/l post surgical changes, and a hypoechoic area in her right breast, retroareolar area at her surgical site, measuring 1.7 x 1.3 cm, for which a 6 month follow up US could be considered, deemed BIRADS 3. \par \par 2021 --\par Frances returns 1 year follow up visit  for left breast cancer treated in 2019.  \par She denies any breast related complaints at this time.  She denies any breast pain, nipple discharge or retraction, and has not palpated any new breast masses. \par \par She has been taking Tamoxifen since 10/01/2019 and is tolerating well.\par \par She had screening MRI in 2021 which did not show suspicious finding. On 21, she had left breast dx mammo and US which showed stable postsurgical changes at the lumpectomy site in superior left breast and no new suspicious mass, microcalcifications or areas of architectural distortion seen in the left breast.

## 2021-11-18 NOTE — DATA REVIEWED
[FreeTextEntry1] : B/L Breast MRI - 04/16/2021:\par Findings:\par \par Amount of fibroglandular tissue: Heterogeneous fibroglandular tissue\par \par Background parenchymal enhancement: Marked, asymmetric; this lowers the\par sensitivity of breast MRI.\par \par RIGHT BREAST:\par No suspicious enhancement is identified in the right breast. Previously noted suspicious lesion in the right breast is no longer seen. There are 2 foci of susceptibility artifact consistent with prior benign biopsies in the inferior right breast. Multiple nonenhancing cysts.\par \par The nipple and skin appear normal.\par There is no axillary adenopathy.\par \par LEFT BREAST:\par There is postsurgical distortion in the lateral left breast consistent with\par postlumpectomy changes. Multiple nonenhancing cysts. No suspicious enhancing\par mass or suspicious area of enhancement is identified.\par \par The nipple and skin appear normal.\par There are slightly prominent left axillary lymph nodes compatible with recent vaccination.\par \par Nodular peripheral density in the right frontal lobe is stable since 2019 and is compatible with focal atelectasis.\par \par Impression:\par \par Postsurgical changes of the left breast, consistent with lumpectomy changes.\par \par No evidence of abnormal enhancement in either breast.\par \par Slightly prominent left axillary nodes are most compatible with recent vaccination..\par \par Recommendation: Unless otherwise indicated by clinical findings, annual screening mammography recommended.\par \par BI-RADS Category 2: Benign\par \par \par Left Uni Dx Mammo & RIght Breast Sono - 05/11/2021:\par Breast composition: The breasts are heterogeneously dense, which may obscure small masses.\par \par Findings:\par \par Mammogram:\par There is redemonstration of stable postsurgical changes at the lumpectomy site in superior left breast.\par No new suspicious mass, microcalcifications or areas of architectural distortion seen in the left breast.\par \par Ultrasound:\par \par Targeted unilateral right breast ultrasound was performed.\par \par There is interval reduction in size of prior noted scar/postsurgical change at the surgical site in the retroareolar region of the right breast 12:00.\par \par Impression: No mammographic or sonographic evidence of malignancy. Postsurgical changes in bilateral breasts.\par \par Recommendation: Unless otherwise indicated by clinical findings, annual screening mammography recommended.\par \par BI-RADS Category 2: Benign\par

## 2021-11-18 NOTE — PHYSICAL EXAM
[Normocephalic] : normocephalic [Atraumatic] : atraumatic [EOMI] : extra ocular movement intact [No Supraclavicular Adenopathy] : no supraclavicular adenopathy [No Cervical Adenopathy] : no cervical adenopathy [Examined in the supine and seated position] : examined in the supine and seated position [No dominant masses] : no dominant masses in right breast  [No dominant masses] : no dominant masses left breast [No Nipple Retraction] : no left nipple retraction [No Nipple Discharge] : no left nipple discharge [No Axillary Lymphadenopathy] : no left axillary lymphadenopathy [Soft] : abdomen soft [Not Tender] : non-tender [No Edema] : no edema [No Rashes] : no rashes [No Ulceration] : no ulceration [de-identified] : surgical incision is well healed, no other suspicious masses palpated  [de-identified] : two surgical incisions are well healed; dense scar tissue in the area of her lumpectomy, improved since her last visit, but no suspicious masses are palpated; she does have hyperpigmentation from her history of radiation therapy

## 2021-11-18 NOTE — ASSESSMENT
[FreeTextEntry1] : Frances is a 51 premenopausal F with an US detected L breast cancer, [id number], ER/MO positive, her 2 neg stage 1 breast cancer, s/p L WLE + SLN Bx on 6/3/19, s/p L breast mass re-excision on 7/8/19 with negative margins. stage 1 A breast cancer; now with RIGHT breast radial scar, detected on breast MRI, s/p R WLE on 7/14/2020, final pathology revealing a radial scar. \par \par -s/p  L WLE and SLN Bx on 6/3/19\par --final pathology revealed positive inferior and medial margins\par \par s/p L breast re-excision on 7/8/19\par -final pathology revealed negative surgical margins\par \par ONCOTYPE RS 7\par -no chemotherapy \par \par s/p L WBI, completed on 9/12/19\par s/p tamoxifen, started on 10/1/19\par \par 7/14/2020 -- R WLE \par -radial scar, no atypia \par \par On exam, I was not able to palpate any suspicious abnormalities within either breast.  She did have dense scarring at her L lumpectomy site, however.  \par \par She had screening MRI in 4/2021 which did not show suspicious finding. On 5/11/21, she had left breast dx mammo and US which showed stable postsurgical changes at the lumpectomy site in superior left breast and no new suspicious mass, microcalcifications or areas of architectural distortion seen in the left breast. \par \par She will be due now for a B/L dx mammogram and US.  She will also be due for a breast MRI in May 2022.  I will have her follow up after. \par \par AS REVIEW: \par We discussed radial scars without atypia.  These lesions are considered fibroproliferative lesions without atypia.  Patients with these lesions were found to have a slightly increased relative risk compared to the reference population.  However the lesions themselves do not have any malignant potential. There is about a 10-20% upgrade rate to a high risk lesion (including atypical hyperplasias) and a <3% upgrade to cancer.  However, when atypia is present there is up to a 15-25% upgrade rate to cancer.  For this reason, we have discussed observation vs. surgical excision of this lesion.  I have recommended that we move forward with surgical excision at this time.\par \par AS REVIEW:\par Her final surgical margins were negative, although she did have widespread DCIS which had close margins, but not positive.  There is no indication for further surgical excision at this time. \par \par She underwent a breast MRI which revealed nonmass enhancement spanning 6.7 cm in her inferior RIGHT breast which was biopsy proven benign proliferative type fibrocystic changes.  No additional abnormalities were noted in her left breast.  We will repeat the MRI 4/19/2020.\par \par She also had a chest CT which revealed a 3 mm solid nodule in her RUL for which no further imaging was indicated as she is a low risk patient.  However, per Dr. Lozada's last note, she will be scheduled for a repeat CT Chest. \par \par SHe also underwent COLOR genetic panel testing which was negative for any mutations. \par \par All of her questions were answered.  She knows to call with any further questions or concerns.\par \par PLAN:\par -B/L Dx Mammogram and US now; If benign, we will plan to discuss results via telephone.\par -B/L Breast MRI - May 2022.\par -f/up after

## 2021-12-01 ENCOUNTER — RESULT REVIEW (OUTPATIENT)
Age: 51
End: 2021-12-01

## 2021-12-01 ENCOUNTER — OUTPATIENT (OUTPATIENT)
Dept: OUTPATIENT SERVICES | Facility: HOSPITAL | Age: 51
LOS: 1 days | Discharge: HOME | End: 2021-12-01
Payer: MEDICAID

## 2021-12-01 DIAGNOSIS — R92.8 OTHER ABNORMAL AND INCONCLUSIVE FINDINGS ON DIAGNOSTIC IMAGING OF BREAST: ICD-10-CM

## 2021-12-01 DIAGNOSIS — Z98.890 OTHER SPECIFIED POSTPROCEDURAL STATES: Chronic | ICD-10-CM

## 2021-12-01 PROCEDURE — 77062 BREAST TOMOSYNTHESIS BI: CPT | Mod: 26

## 2021-12-01 PROCEDURE — 77066 DX MAMMO INCL CAD BI: CPT | Mod: 26

## 2021-12-01 PROCEDURE — G0279: CPT | Mod: 26

## 2021-12-01 PROCEDURE — 76641 ULTRASOUND BREAST COMPLETE: CPT | Mod: 26,50

## 2022-01-10 ENCOUNTER — OUTPATIENT (OUTPATIENT)
Dept: OUTPATIENT SERVICES | Facility: HOSPITAL | Age: 52
LOS: 1 days | Discharge: HOME | End: 2022-01-10

## 2022-01-10 ENCOUNTER — APPOINTMENT (OUTPATIENT)
Dept: HEMATOLOGY ONCOLOGY | Facility: CLINIC | Age: 52
End: 2022-01-10
Payer: MEDICAID

## 2022-01-10 VITALS
BODY MASS INDEX: 35.28 KG/M2 | HEIGHT: 59 IN | RESPIRATION RATE: 16 BRPM | SYSTOLIC BLOOD PRESSURE: 149 MMHG | WEIGHT: 175 LBS | HEART RATE: 78 BPM | DIASTOLIC BLOOD PRESSURE: 79 MMHG

## 2022-01-10 DIAGNOSIS — Z98.890 OTHER SPECIFIED POSTPROCEDURAL STATES: Chronic | ICD-10-CM

## 2022-01-10 PROCEDURE — 99214 OFFICE O/P EST MOD 30 MIN: CPT

## 2022-01-10 NOTE — ASSESSMENT
[FreeTextEntry1] : 49 yo premenopausal female has IDC of the left breast, stage IA ( [id number]), ER/NH positive, Her 2 neg, G1, s/p lumpectomy and SLNB.\par RS 7, in the low risk range.\par \par Recommendation:\par -- Continue Tamoxifen daily.\par -- Breast exam today reveals stable post treatment change in the left breast. There is no palpable abnormally. She will have left breast dx mammo and UD in 6/2022.  \par -- Followup with Dr. Tiwari as scheduled.\par -- Followup with PCP and GYN for health maintenance.\par -- RTO for followup in 6 months.\par \par \par \par \par

## 2022-01-10 NOTE — HISTORY OF PRESENT ILLNESS
[de-identified] : 48 yr old female is referred by Dr. Tiwari for consultation of adjuvant systemic therapy for newly diagnosed right breast cancer. The patient felt a palpable lump in her left breast  and underwent diagnostic workup.  \par \par Bilateral dx mammo and US on 3/13/19 revealed right breast mass  - @1N2, mass measuring 1.2 x 1.4 x 0.5 cm, no change, deemed BIRADS 3 -@6N4, mass measuring 0.6 x 0.9 x 0.4 cm, no change, deemed BIRADS 3. LEFT breast @12N2, cyst measuring 1 x 1 x 0.8 cm - @5N5, mass measuring 1.2 x 1.4 x 0.5 cm, no change, deemed BIRADS 3 -@3-4N4, ill defined mass, measuring 0.5 x 0.7 x 0.6 cm, mammographically occult, BIOPSY recommended for BIRADS 4 of above lesion\par \par On 3/18/19, she underwent a left US CNBx @3-4N4 which revealed microscopic IDC, well differentiated ER/AK positive, Her 2 negative\par \par On 19, b/l breast MRI showed Right breast 6.7 cm nonmass enhancement in inferior R breast --> rec BIOPSY of anterior and posterior aspect, and left breast lateral mid depth, irregular enhancing mass measuring 0.9 cm consistent with known cancer \par \par 19 -- CT Chest: 3 mm solid nodule in RUL \par \par 19 -- R MRI guided biopsy x 2 \par -benign proliferative type FC changes x 2\par \par The patient had COLOR genetic testing. She is negative for any mutations.\par \par On 6/3/19, she underwent left WLE with SLN Bx. The pathology showed 9 mm IDC, well differentiated, focal lobular features, low nuclear grade, invasive cancer extends to broadly involve inferior and medial margins. There was cLCIS, radial scar. 5 SLNs were negative for malignancy.  The invasive tumor was ER/AK pos 80%, Her 2 neg on IHC, Ki 67 3-5%.\par \par The surgical specimen was sent for Oncotype dx analysis. her RS is 7, in the low risk range and predicting 3% risk of distant recurrence with endocrine therapy alone.\par \par The patient is previously healthy, no PMH, no family history of breast cancer or other cancers. She is , age at first live birth was 24. No prior OCP use.\par She denies smoking or drinking. She is up to date on pap smear and colonoscopy . Her LMP was on 2019. \par \par She is here to discuss systemic adjuvant therapy. She is able to speak English and understands conversation well. We tried to use  service but there was connection problem.\par \par \par \par \par \par \par \par  [de-identified] : \par 10/3/19\par Patient is here today for follow up visit.  She offers no complaints She completed radiation #21 on 9/17/19 and started Tamoxifen 10/1/19.  She is tolerating Tamoxifen so far.  She continues to get monthly menses. She has a follow up appt with Dr. Tiwari on 10/16/19.\par Previous labs and scans reviewed.  She is due to have a CT Chest for re-evaluation of a 3 mm solid nodule RUL.\par \par 1/2/20:\par Patient is here today for follow up visit.  She completed adjuvant WBRT in 9/2019. She has been taking Tamoxifen since 10/1/19.  She is tolerating Tamoxifen well.  \par She had repeat dx mammo and US in 10/2019 which showed stable architectural distortion most consistent with postsurgical change. Two benign right breast masses and one benign left breast mass as above. No evidence of malignancy. As per post lumpectomy routine, a follow-up unilateral left mammogram is recommended. \par She also had CT chest in 10/2019 which showed stable 3 mm solid nodule RUL. There was no other abnormal finding. She is feeling well and has no breast related symptoms.\par \par 7/2/2020\par 50 yo female is here for follow up visit for h/o  IDC of the left breast, stage IA ( [id number]), ER/IA positive, Her 2 neg, G1, s/p lumpectomy and SLNB. She has been taking Tamoxifen 10/2019 and tolerating it well. She does not c/o any breast pain or lumps.  On 4/28/2020, she had MRI guided biopsy which showed complex sclerosing lesion (radial scar) with focal peripheral micropapillomatous architecture.  She is scheduled for a surgical excision of right breast on 7/14/2020.  She has regular menstrual periods.  LMP was 6/2020.\par Mammogram: 4/30/2020 shows:\par Stable postsurgical distortion in the left breast upper outer quadrant consistent with prior lumpectomy. No suspicious mass, microcalcifications or areas of architectural distortion is seen either breast. When compared to the previous examinations there is no significant interval change and nothing to suggest malignancy. \par Impression: No mammographic evidence of malignancy. Stable postsurgical changes of the left breast. \par MRI breasts 4/21/2020 shows:\par Postsurgical changes of the left breast, consistent with lumpectomy changes.  At the 12:00 axis of the right breast, there is a 0.5 cm indeterminate \par enhancing mass for which MRI guided biopsy is recommended. Recommendation: MRI guided biopsy. \par MRI Right breast bx: 4/28/2020 pathology shows:\par -Complex sclerosing lesion (radial scar) with focal peripheral micropapillomatous architecture. \par -Benign breast tissue with proliferative type fibrocystic changes including florid duct hyperplasia associated with collagenous spherulosis, stromal \par fibrosis, sclerosing and blunt duct adenosis, apocrine metaplasia-lined cysts, and microcalcifications. \par -Focal mammary duct ectasia. \par This is benign, high risk, concordant histology. Surgical excision is recommended. \par \par 01/04/2021\par Patient is here today for follow up visit h/o IDC of the left breast, stage IA ( [id number]), ER/IA positive, Her 2 neg, G1, s/p lumpectomy and SLNB. She has been taking Tamoxifen since 10/1/19. She is tolerating Tamoxifen well.  On 4/28/2020, she had MRI guided biopsy which showed complex sclerosing lesion (radial scar) with focal peripheral micropapillomatous architecture.  She is s/p R WLE on 7/14/2020, final pathology revealing a radial scar, no atypia. Her most recent imaging was a b/l dx mammogram and US on 11/10/2020 which revealed b/l post surgical changes, and a hypoechoic area in her right breast, retroareolar area at her surgical site, measuring 1.7 x 1.3 cm, for which a 6 month follow up US could be considered, deemed BIRADS 3. She will be due for a L dx mammogram and US on 5/10/2021. She will also be due for a breast MRI on 4/21/21. \par Last LMP was 10/2020. \par \par 7/8/21:\par Patient is here today for follow up visit. She has stage IA ( [id number]) IDC of the left breast, ER/IA positive, Her 2 neg, G1, s/p lumpectomy and SLNB. She has been taking Tamoxifen since 10/1/19. She is tolerating Tamoxifen well.  She had screening MRI in 4/2021 which did not show suspicious finding. On 5/11/21, she had left breast dx mammo and US which showed stable postsurgical changes at the lumpectomy site in superior left breast and no new suspicious mass, microcalcifications or areas of architectural distortion seen in the left breast. The annual screening mammography recommended. She still has her periods.\par \par 1/10/22:\par Patient is here today for follow up visit. She has stage IA ( [id number]) IDC of the left breast, ER/IA positive, Her 2 neg, G1, s/p lumpectomy and SLNB. She has been taking Tamoxifen since 10/1/19. She is tolerating Tamoxifen well. She had screening MRI in 4/2021 which did not show suspicious finding. On 12/1/2, she had b/l breast dx mammo and US which showed bilateral stable postsurgical changes. No sonographic evidence of malignancy of the right breast. Probably benign left breast masses for which short-term follow-up is recommended. She has not had her period since 6/2021.\par

## 2022-01-10 NOTE — CONSULT LETTER
[Dear  ___] : Dear  [unfilled], [Courtesy Letter:] : I had the pleasure of seeing your patient, [unfilled], in my office today. [Please see my note below.] : Please see my note below. [Sincerely,] : Sincerely, [FreeTextEntry3] : Chung Lozada MD [DrGumaro  ___] : Dr. BREWSTER

## 2022-01-10 NOTE — PHYSICAL EXAM
[Fully active, able to carry on all pre-disease performance without restriction] : Status 0 - Fully active, able to carry on all pre-disease performance without restriction [Normal Male] : prostate smooth, symmetric with no modularity or induration [Normal] : normal appearance, no rash, nodules, vesicles, ulcers, erythema [de-identified] : Status post left lumpectomy and SLNB. The surgical scars are healing well. There is no palpable abnormality. post radiation erythema noted to left breast, no tenderness to palpation. S/p right breast excision

## 2022-01-17 DIAGNOSIS — C50.412 MALIGNANT NEOPLASM OF UPPER-OUTER QUADRANT OF LEFT FEMALE BREAST: ICD-10-CM

## 2022-01-17 DIAGNOSIS — Z51.81 ENCOUNTER FOR THERAPEUTIC DRUG LEVEL MONITORING: ICD-10-CM

## 2022-02-02 ENCOUNTER — APPOINTMENT (OUTPATIENT)
Dept: INTERNAL MEDICINE | Facility: CLINIC | Age: 52
End: 2022-02-02
Payer: MEDICAID

## 2022-02-02 ENCOUNTER — OUTPATIENT (OUTPATIENT)
Dept: OUTPATIENT SERVICES | Facility: HOSPITAL | Age: 52
LOS: 1 days | Discharge: HOME | End: 2022-02-02

## 2022-02-02 VITALS
HEART RATE: 64 BPM | BODY MASS INDEX: 34.88 KG/M2 | DIASTOLIC BLOOD PRESSURE: 75 MMHG | HEIGHT: 59 IN | TEMPERATURE: 97.3 F | WEIGHT: 173 LBS | OXYGEN SATURATION: 98 % | SYSTOLIC BLOOD PRESSURE: 123 MMHG

## 2022-02-02 DIAGNOSIS — E55.9 VITAMIN D DEFICIENCY, UNSPECIFIED: ICD-10-CM

## 2022-02-02 DIAGNOSIS — Z00.00 ENCOUNTER FOR GENERAL ADULT MEDICAL EXAMINATION W/OUT ABNORMAL FINDINGS: ICD-10-CM

## 2022-02-02 DIAGNOSIS — Z98.890 OTHER SPECIFIED POSTPROCEDURAL STATES: Chronic | ICD-10-CM

## 2022-02-02 DIAGNOSIS — C50.412 MALIGNANT NEOPLASM OF UPPER-OUTER QUADRANT OF LEFT FEMALE BREAST: ICD-10-CM

## 2022-02-02 DIAGNOSIS — Z00.00 ENCOUNTER FOR GENERAL ADULT MEDICAL EXAMINATION WITHOUT ABNORMAL FINDINGS: ICD-10-CM

## 2022-02-02 PROCEDURE — 99214 OFFICE O/P EST MOD 30 MIN: CPT | Mod: GC

## 2022-02-17 NOTE — HISTORY OF PRESENT ILLNESS
[de-identified] : Patient is 51 year old with hx of  stage IA ( [id number]) IDC of the left breast, ER/NE positive, Her 2 neg, G1, s/p lumpectomy and SLNB. Right breast lumpectomy. She has been taking Tamoxifen since 10/1/19. She is tolerating Tamoxifen well. She completed radiation therapy. no chemotherapy. She follows with Dr. Tiwari and Dr. Lozada. She had screening MRI in 4/2021 which did not show suspicious finding. On 12/1/22, she had b/l breast dx mammo and US which showed bilateral stable postsurgical changes. No sonographic evidence of malignancy of the right breast. Probably benign left breast masses for which short-term follow-up is recommended. She has not had her period since 6/2021. Patient feels fine today with no complaints. She never had colonoscopy and never been to GYN. non smoker. no alcohol. her last blood work 6 months ago showed some vit D def.  [FreeTextEntry1] : initial evaluation

## 2022-02-17 NOTE — ASSESSMENT
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 Right breast lumpectomy on tamoxifen s/p radiation presenting to establish care\par \par # Breast Cancer IDC s/p nakul lumpectomy \par - on tamoxifen\par - s/p radiation therapy completed\par - follows with Dr. Tiwari and Dr. Lozada\par \par # Vitamin D def\par - will start supplement\par \par # Pre diabetes\par - A1c 5.7\par - advised on diet and exercise\par \par # Health maintenance\par - patient referred to GI for colonoscopy\par - patient referred to GYN for pap smear\par - labs and fu in 6 months\par - Flu vaccine given today\par - COVID shots uptodate

## 2022-06-02 ENCOUNTER — OUTPATIENT (OUTPATIENT)
Dept: OUTPATIENT SERVICES | Facility: HOSPITAL | Age: 52
LOS: 1 days | Discharge: HOME | End: 2022-06-02
Payer: COMMERCIAL

## 2022-06-02 DIAGNOSIS — Z98.890 OTHER SPECIFIED POSTPROCEDURAL STATES: Chronic | ICD-10-CM

## 2022-06-02 DIAGNOSIS — R92.8 OTHER ABNORMAL AND INCONCLUSIVE FINDINGS ON DIAGNOSTIC IMAGING OF BREAST: ICD-10-CM

## 2022-06-02 PROCEDURE — 77065 DX MAMMO INCL CAD UNI: CPT | Mod: 26,LT

## 2022-06-02 PROCEDURE — 76642 ULTRASOUND BREAST LIMITED: CPT | Mod: 26,LT

## 2022-06-02 PROCEDURE — G0279: CPT | Mod: 26

## 2022-06-03 ENCOUNTER — NON-APPOINTMENT (OUTPATIENT)
Age: 52
End: 2022-06-03

## 2022-06-23 ENCOUNTER — EMERGENCY (EMERGENCY)
Facility: HOSPITAL | Age: 52
LOS: 0 days | Discharge: HOME | End: 2022-06-23
Attending: EMERGENCY MEDICINE | Admitting: EMERGENCY MEDICINE
Payer: COMMERCIAL

## 2022-06-23 VITALS
SYSTOLIC BLOOD PRESSURE: 132 MMHG | DIASTOLIC BLOOD PRESSURE: 63 MMHG | WEIGHT: 199.96 LBS | RESPIRATION RATE: 18 BRPM | TEMPERATURE: 99 F | HEART RATE: 64 BPM | OXYGEN SATURATION: 98 %

## 2022-06-23 DIAGNOSIS — Z85.3 PERSONAL HISTORY OF MALIGNANT NEOPLASM OF BREAST: ICD-10-CM

## 2022-06-23 DIAGNOSIS — M25.561 PAIN IN RIGHT KNEE: ICD-10-CM

## 2022-06-23 DIAGNOSIS — M25.551 PAIN IN RIGHT HIP: ICD-10-CM
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
## 2022-06-23 DIAGNOSIS — Z23 ENCOUNTER FOR IMMUNIZATION: ICD-10-CM

## 2022-06-23 DIAGNOSIS — M25.562 PAIN IN LEFT KNEE: ICD-10-CM

## 2022-06-23 DIAGNOSIS — M25.572 PAIN IN LEFT ANKLE AND JOINTS OF LEFT FOOT: ICD-10-CM
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
## 2022-06-23 DIAGNOSIS — Z98.890 OTHER SPECIFIED POSTPROCEDURAL STATES: Chronic | ICD-10-CM

## 2022-06-23 DIAGNOSIS — M25.571 PAIN IN RIGHT ANKLE AND JOINTS OF RIGHT FOOT: ICD-10-CM

## 2022-06-23 LAB
ALBUMIN SERPL ELPH-MCNC: 3.9 G/DL — SIGNIFICANT CHANGE UP (ref 3.5–5.2)
ALP SERPL-CCNC: 77 U/L — SIGNIFICANT CHANGE UP (ref 30–115)
ALT FLD-CCNC: 14 U/L — SIGNIFICANT CHANGE UP (ref 0–41)
ANION GAP SERPL CALC-SCNC: 10 MMOL/L — SIGNIFICANT CHANGE UP (ref 7–14)
APTT BLD: 27.6 SEC — SIGNIFICANT CHANGE UP (ref 27–39.2)
AST SERPL-CCNC: 17 U/L — SIGNIFICANT CHANGE UP (ref 0–41)
BASOPHILS # BLD AUTO: 0.02 K/UL — SIGNIFICANT CHANGE UP (ref 0–0.2)
BASOPHILS NFR BLD AUTO: 0.1 % — SIGNIFICANT CHANGE UP (ref 0–1)
BILIRUB SERPL-MCNC: <0.2 MG/DL — SIGNIFICANT CHANGE UP (ref 0.2–1.2)
BLD GP AB SCN SERPL QL: SIGNIFICANT CHANGE UP
BUN SERPL-MCNC: 22 MG/DL — HIGH (ref 10–20)
CALCIUM SERPL-MCNC: 8.9 MG/DL — SIGNIFICANT CHANGE UP (ref 8.5–10.1)
CHLORIDE SERPL-SCNC: 105 MMOL/L — SIGNIFICANT CHANGE UP (ref 98–110)
CK SERPL-CCNC: 182 U/L — SIGNIFICANT CHANGE UP (ref 0–225)
CO2 SERPL-SCNC: 24 MMOL/L — SIGNIFICANT CHANGE UP (ref 17–32)
CREAT SERPL-MCNC: 1 MG/DL — SIGNIFICANT CHANGE UP (ref 0.7–1.5)
EGFR: 69 ML/MIN/1.73M2 — SIGNIFICANT CHANGE UP
EOSINOPHIL # BLD AUTO: 0.21 K/UL — SIGNIFICANT CHANGE UP (ref 0–0.7)
EOSINOPHIL NFR BLD AUTO: 1.6 % — SIGNIFICANT CHANGE UP (ref 0–8)
GLUCOSE SERPL-MCNC: 117 MG/DL — HIGH (ref 70–99)
HCG SERPL QL: NEGATIVE — SIGNIFICANT CHANGE UP
HCT VFR BLD CALC: 34.7 % — LOW (ref 37–47)
HGB BLD-MCNC: 11.9 G/DL — LOW (ref 12–16)
IMM GRANULOCYTES NFR BLD AUTO: 0.4 % — HIGH (ref 0.1–0.3)
INR BLD: 0.92 RATIO — SIGNIFICANT CHANGE UP (ref 0.65–1.3)
LYMPHOCYTES # BLD AUTO: 1.9 K/UL — SIGNIFICANT CHANGE UP (ref 1.2–3.4)
LYMPHOCYTES # BLD AUTO: 14.1 % — LOW (ref 20.5–51.1)
MCHC RBC-ENTMCNC: 29 PG — SIGNIFICANT CHANGE UP (ref 27–31)
MCHC RBC-ENTMCNC: 34.3 G/DL — SIGNIFICANT CHANGE UP (ref 32–37)
MCV RBC AUTO: 84.4 FL — SIGNIFICANT CHANGE UP (ref 81–99)
MONOCYTES # BLD AUTO: 0.86 K/UL — HIGH (ref 0.1–0.6)
MONOCYTES NFR BLD AUTO: 6.4 % — SIGNIFICANT CHANGE UP (ref 1.7–9.3)
NEUTROPHILS # BLD AUTO: 10.41 K/UL — HIGH (ref 1.4–6.5)
NEUTROPHILS NFR BLD AUTO: 77.4 % — HIGH (ref 42.2–75.2)
NRBC # BLD: 0 /100 WBCS — SIGNIFICANT CHANGE UP (ref 0–0)
PLATELET # BLD AUTO: 186 K/UL — SIGNIFICANT CHANGE UP (ref 130–400)
POTASSIUM SERPL-MCNC: 4.1 MMOL/L — SIGNIFICANT CHANGE UP (ref 3.5–5)
POTASSIUM SERPL-SCNC: 4.1 MMOL/L — SIGNIFICANT CHANGE UP (ref 3.5–5)
PROT SERPL-MCNC: 6.5 G/DL — SIGNIFICANT CHANGE UP (ref 6–8)
PROTHROM AB SERPL-ACNC: 10.6 SEC — SIGNIFICANT CHANGE UP (ref 9.95–12.87)
RBC # BLD: 4.11 M/UL — LOW (ref 4.2–5.4)
RBC # FLD: 12.6 % — SIGNIFICANT CHANGE UP (ref 11.5–14.5)
SARS-COV-2 RNA SPEC QL NAA+PROBE: SIGNIFICANT CHANGE UP
SODIUM SERPL-SCNC: 139 MMOL/L — SIGNIFICANT CHANGE UP (ref 135–146)
WBC # BLD: 13.46 K/UL — HIGH (ref 4.8–10.8)
WBC # FLD AUTO: 13.46 K/UL — HIGH (ref 4.8–10.8)

## 2022-06-23 PROCEDURE — 73610 X-RAY EXAM OF ANKLE: CPT | Mod: 26,50

## 2022-06-23 PROCEDURE — 99285 EMERGENCY DEPT VISIT HI MDM: CPT

## 2022-06-23 PROCEDURE — 70450 CT HEAD/BRAIN W/O DYE: CPT | Mod: 26,MA

## 2022-06-23 PROCEDURE — 74176 CT ABD & PELVIS W/O CONTRAST: CPT | Mod: 26,MA

## 2022-06-23 PROCEDURE — 72170 X-RAY EXAM OF PELVIS: CPT | Mod: 26

## 2022-06-23 PROCEDURE — 71250 CT THORAX DX C-: CPT | Mod: 26,MA

## 2022-06-23 PROCEDURE — 73562 X-RAY EXAM OF KNEE 3: CPT | Mod: 26,50

## 2022-06-23 RX ORDER — IBUPROFEN 200 MG
600 TABLET ORAL ONCE
Refills: 0 | Status: COMPLETED | OUTPATIENT
Start: 2022-06-23 | End: 2022-06-23

## 2022-06-23 RX ORDER — TETANUS TOXOID, REDUCED DIPHTHERIA TOXOID AND ACELLULAR PERTUSSIS VACCINE, ADSORBED 5; 2.5; 8; 8; 2.5 [IU]/.5ML; [IU]/.5ML; UG/.5ML; UG/.5ML; UG/.5ML
0.5 SUSPENSION INTRAMUSCULAR ONCE
Refills: 0 | Status: COMPLETED | OUTPATIENT
Start: 2022-06-23 | End: 2022-06-23

## 2022-06-23 RX ORDER — OXYCODONE AND ACETAMINOPHEN 5; 325 MG/1; MG/1
1 TABLET ORAL ONCE
Refills: 0 | Status: DISCONTINUED | OUTPATIENT
Start: 2022-06-23 | End: 2022-06-23

## 2022-06-23 RX ADMIN — TETANUS TOXOID, REDUCED DIPHTHERIA TOXOID AND ACELLULAR PERTUSSIS VACCINE, ADSORBED 0.5 MILLILITER(S): 5; 2.5; 8; 8; 2.5 SUSPENSION INTRAMUSCULAR at 21:25

## 2022-06-23 RX ADMIN — Medication 600 MILLIGRAM(S): at 17:28

## 2022-06-23 RX ADMIN — OXYCODONE AND ACETAMINOPHEN 1 TABLET(S): 5; 325 TABLET ORAL at 19:36
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
## 2022-06-23 NOTE — ED ADULT NURSE NOTE - NSFALLRSKASSESSTYPE_ED_ALL_ED
Progress Notes by Dandy Vaca DO at 11/29/17 05:29 PM     Author:  Dandy Vaca DO Service:  (none) Author Type:  Physician     Filed:  11/29/17 05:49 PM Encounter Date:  11/29/2017 Status:  Signed     :  Dandy Vaca DO (Physician)            Last visit with DANDY VACA was on No match found  Last visit with [facility] was on 10/08/2014 at  2:55 PM in [facility]  Match done based on reference date of today 11/29/17  Darron is a 25 year old male who presents to [facility] for a week of worsening sinus symptoms.  Complaining of sinus pressure, cough and purulent drainage.  Not having any wheeze, hemoptysis, orthopnea or chest pain. Denies weight loss, recent travel.  Not responding to OTC medications.      Other ROS:  Fevers:[RI1.1T]no[RI1.2M]  Tobacco Use:[RI1.1T]no[RI1.2M]    No past medical history on file.  Allergies:  Review of patient's allergies indicates no known allergies.    OBJECTIVE:  There were no vitals taken for this visit.  Nontoxic in appearance, normal mental status.  Cranial nerves intact    Ears:  No canal or TM erythema  Conjunctiva- clear  Positive paranasal tenderness  Nasal Mucosa- pale, boggy, purulent rhinorhea noted    Oropharynx- moist mucus membranes, erythematous- some drainage noted  Airways patents  Neck supple, some anterior cervical lymphadenopathy noted.    No meningeal signs    Lungs- some cough, but essentially clear- no wheexing noted  CVS- S1 S2 present, no rubs murmur or gallop noted    Assesement/Plan:    Sinusitis    Discussed this with the patient.  Recommend symptomatic measures, including OTC decongestants and/or antihistamines.    @  No current outpatient prescriptions on file.       Side effects of all medications were discussed.  Patient is instructed to follow up in 2-3 days or as needed.  Patient is to go to the emergency room for any significant change or worsening.  Patient was discharged in a stable condition and was  in agreement with the plan.  No further questions.    Electronically Signed by:    Dandy Vaca DO , 11/29/2017[RI1.1T]              Revision History        User Key Date/Time User Provider Type Action    > RI1.2 11/29/17 05:49 PM Dandy Vaca DO Physician Sign     RI1.1 11/29/17 05:29 PM Dandy Vaca DO Physician     M - Manual, T - Template             Initial (On Arrival)
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
## 2022-06-23 NOTE — ED PROVIDER NOTE - NS ED ATTENDING STATEMENT MOD
This was a shared visit with the PATTI. I reviewed and verified the documentation and independently performed the documented: Attending with

## 2022-06-23 NOTE — ED PROVIDER NOTE - PATIENT PORTAL LINK FT
You can access the FollowMyHealth Patient Portal offered by [facility] by registering at the following website: [email or web address]. By joining [employer]’s FollowMyHealth portal, you will also be able to view your health information using other applications (apps) compatible with our system.

## 2022-06-23 NOTE — ED PROVIDER NOTE - CARE PROVIDER_API CALL
Shania Morgan)  Internal Medicine  [address] [place] [address], [address] [place] [address]  Phone: [phone]  Fax: [phone]  Established Patient  Follow Up Time: 1-3 Days

## 2022-06-23 NOTE — ED ADULT NURSE NOTE - NSIMPLEMENTINTERV_GEN_ALL_ED
Implemented All Universal Safety Interventions:  [place] to call system. Call bell, personal items and telephone within reach. Instruct patient to call for assistance. Room bathroom lighting operational. Non-slip footwear when patient is off stretcher. Physically safe environment: no spills, clutter or unnecessary equipment. Stretcher in lowest position, wheels locked, appropriate side rails in place.

## 2022-07-18 ENCOUNTER — APPOINTMENT (OUTPATIENT)
Dept: INTERNAL MEDICINE | Facility: CLINIC | Age: 52
End: 2022-07-18

## 2022-07-18 ENCOUNTER — NON-APPOINTMENT (OUTPATIENT)
Age: 52
End: 2022-07-18

## 2022-07-18 ENCOUNTER — OUTPATIENT (OUTPATIENT)
Dept: OUTPATIENT SERVICES | Facility: HOSPITAL | Age: 52
LOS: 1 days | Discharge: HOME | End: 2022-07-18

## 2022-07-18 VITALS
TEMPERATURE: 99 F | BODY MASS INDEX: 36.49 KG/M2 | SYSTOLIC BLOOD PRESSURE: 128 MMHG | WEIGHT: 181 LBS | HEART RATE: 76 BPM | OXYGEN SATURATION: 98 % | HEIGHT: 59 IN | DIASTOLIC BLOOD PRESSURE: 72 MMHG

## 2022-07-18 DIAGNOSIS — Z98.890 OTHER SPECIFIED POSTPROCEDURAL STATES: Chronic | ICD-10-CM
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
## 2022-07-18 PROCEDURE — 99214 OFFICE O/P EST MOD 30 MIN: CPT | Mod: GC
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
## 2022-07-19 DIAGNOSIS — Z51.81 ENCOUNTER FOR THERAPEUTIC DRUG LEVEL MONITORING: ICD-10-CM

## 2022-07-19 DIAGNOSIS — E55.9 VITAMIN D DEFICIENCY, UNSPECIFIED: ICD-10-CM
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
## 2022-07-19 DIAGNOSIS — R92.8 OTHER ABNORMAL AND INCONCLUSIVE FINDINGS ON DIAGNOSTIC IMAGING OF BREAST: ICD-10-CM

## 2022-07-21 ENCOUNTER — APPOINTMENT (OUTPATIENT)
Dept: HEMATOLOGY ONCOLOGY | Facility: CLINIC | Age: 52
End: 2022-07-21

## 2022-08-09 ENCOUNTER — OUTPATIENT (OUTPATIENT)
Dept: OUTPATIENT SERVICES | Facility: HOSPITAL | Age: 52
LOS: 1 days | Discharge: HOME | End: 2022-08-09

## 2022-08-09 ENCOUNTER — RESULT REVIEW (OUTPATIENT)
Age: 52
End: 2022-08-09

## 2022-08-09 DIAGNOSIS — M79.606 PAIN IN LEG, UNSPECIFIED: ICD-10-CM

## 2022-08-09 DIAGNOSIS — Z98.890 OTHER SPECIFIED POSTPROCEDURAL STATES: Chronic | ICD-10-CM

## 2022-08-09 PROCEDURE — 93970 EXTREMITY STUDY: CPT | Mod: 26

## 2022-08-18 ENCOUNTER — NON-APPOINTMENT (OUTPATIENT)
Age: 52
End: 2022-08-18

## 2022-08-22 ENCOUNTER — APPOINTMENT (OUTPATIENT)
Dept: BREAST CENTER | Facility: CLINIC | Age: 52
End: 2022-08-22

## 2022-08-22 PROCEDURE — 99213 OFFICE O/P EST LOW 20 MIN: CPT

## 2022-08-22 NOTE — DATA REVIEWED
[FreeTextEntry1] : ACC: [id number] EXAM:  MG US BREAST LIMITED LT                      \par ACC: [id number] EXAM:  MG MAMMO DIAG W CRISTHIAN LT#                      \par 06/02/2022  \par INTERPRETATION:  Clinical History / Reason for exam: Personal history of \par left breast cancer status post lumpectomy in May 2019. Short interval \par follow-up of probably benign left breast masses, first identified in \par December 2021.\par \par The patient reports her last clinical breast examination was performed \par within the past year.\par \par Family history: None\par \par Comparisons: Priors dating back to 2019.\par \par Views obtained:Full-field CC and MLO views with tomosynthesis.  Spot \par magnifications of the left breast.\par \par Computer-aided detection was utilized in the interpretation of this \par examination.\par \par Breast composition:The breasts are heterogeneously dense, which may \par obscure small masses.\par \par Findings:\par \par Mammogram:\par \par Linear marker denotes site of cutaneous scarring in the left breast. \par There is associated postsurgical distortion. No new suspicious mass, \par microcalcifications or areas of architectural distortion is seen the left \par breast. When compared to the previous examinations there is no \par significant interval change and nothing to suggest malignancy.\par \par Ultrasound:\par \par Targeted unilateral left breast ultrasound was performed.\par \par At the 1:00 position 5 cm from the nipple, there is a stable hypoechoic \par mass measuring 0.5 x 0.5 x 0.3 cm, probably benign.\par \par At the 12 to 1:00 position 7 cm from the nipple, there is a stable \par hypoechoic mass measuring 0.3 x 0.6 x 0.3 cm, probably benign.\par \par Impression: Stable postlumpectomy changes of the left breast. Probably \par benign left breast masses for which short-term sonographic follow-up in 6 \par months to ensure stability is recommended.\par \par Recommendation: Follow-up bilateral diagnostic mammogram and ultrasound \par in 6 months.\par \par BI-RADS category 3: Probably Benign\par \par

## 2022-08-22 NOTE — PHYSICAL EXAM
[Normocephalic] : normocephalic [Atraumatic] : atraumatic [EOMI] : extra ocular movement intact [Examined in the supine and seated position] : examined in the supine and seated position [Symmetrical] : symmetrical [No dominant masses] : no dominant masses in right breast  [No dominant masses] : no dominant masses left breast [No Nipple Retraction] : no left nipple retraction [No Nipple Discharge] : no left nipple discharge [No Axillary Lymphadenopathy] : no left axillary lymphadenopathy [No Edema] : no edema [No Ulceration] : no ulceration [de-identified] : two surgical incisions are well healed; no suspicious masses are palpated; she does have hyperpigmentation from her history of radiation therapy \par  [de-identified] : hyperpigmentation from her history of radiation therapy

## 2022-08-22 NOTE — ASSESSMENT
[FreeTextEntry1] : Frances is a 51 premenopausal F with an US detected L breast cancer, [id number], ER/GA positive, her 2 neg stage 1 breast cancer, s/p L WLE + SLN Bx on 6/3/19, s/p L breast mass re-excision on 7/8/19 with negative margins. stage 1 A breast cancer; now with RIGHT breast radial scar, detected on breast MRI, s/p R WLE on 7/14/2020, final pathology revealing a radial scar. \par \par -s/p  L WLE and SLN Bx on 6/3/19\par --final pathology revealed positive inferior and medial margins\par \par s/p L breast re-excision on 7/8/19\par -final pathology revealed negative surgical margins\par \par ONCOTYPE RS 7\par -no chemotherapy \par \par s/p L WBI, completed on 9/12/19\par s/p tamoxifen, started on 10/1/19\par \par 7/14/2020 -- R WLE \par -radial scar, no atypia \par \par On exam, I was not able to palpate any suspicious abnormalities within either breast.   \par \par Her imaging is as follows:\par 06/02/2022  left dx mammo and US\par -breasts are heterogeneously dense\par -Linear marker denotes site of cutaneous scarring in the left breast. \par -There is associated postsurgical distortion.\par \par LEFT US:\par -@1N 5  there is a stable hypoechoic mass measuring 0.5 x 0.5 x 0.3 cm, probably benign.\par -@ 12 to 1:00N 7, there is a stable hypoechoic mass measuring 0.3 x 0.6 x 0.3 cm, probably benign.\par BIRADS3\par \par She will be due now for a B/L dx mammogram and US on 12/2/22.  \par She IS over due for a breast MRI.\par \par We also discussed BIRADS 3 lesions.  These lesions have a 2% chance of harboring malignancy.  There is always an option to obtain a tissue sample with a biopsy to confirm the diagnosis.   The procedure was described in detail including the placement of a tissue marker clip.  The risks of the procedure, including but not limited to bleeding and infection were also explained.  She is not interested in biopsy at this time and agrees to continue with short-term interval imaging.\par \par We discussed dense breasts.  Increasing breast density has been found to increase ones risk of breast cancer, but at this time, there is no clear indication for additional imaging in this setting, as both US and MRI have not been found to improve survival.  One can consider bilateral screening US.  However, out of 1000 women screened, the use of routine US will only identify an additional 3-5 cancers.  The use of US was found to increase the likelihood of undergoing more imaging and more biopsies.  She does have dense breasts.  We have decided to proceed with screening bilateral breast US at this time.  This will be scheduled with her next screening mammogram.\par \par \par AS REVIEW: \par We discussed radial scars without atypia.  These lesions are considered fibroproliferative lesions without atypia.  Patients with these lesions were found to have a slightly increased relative risk compared to the reference population.  However the lesions themselves do not have any malignant potential. There is about a 10-20% upgrade rate to a high risk lesion (including atypical hyperplasias) and a <3% upgrade to cancer.  However, when atypia is present there is up to a 15-25% upgrade rate to cancer.  For this reason, we have discussed observation vs. surgical excision of this lesion.  I have recommended that we move forward with surgical excision at this time.\par \par AS REVIEW:\par Her final surgical margins were negative, although she did have widespread DCIS which had close margins, but not positive.  There is no indication for further surgical excision at this time. \par \par She underwent a breast MRI which revealed nonmass enhancement spanning 6.7 cm in her inferior RIGHT breast which was biopsy proven benign proliferative type fibrocystic changes.  No additional abnormalities were noted in her left breast.  We will repeat the MRI 4/19/2020.\par \par She also had a chest CT which revealed a 3 mm solid nodule in her RUL for which no further imaging was indicated as she is a low risk patient.  However, per Dr. Lozada's last note, she will be scheduled for a repeat CT Chest. \par \par SHe also underwent COLOR genetic panel testing which was negative for any mutations. \par \par All of her questions were answered.  She knows to call with any further questions or concerns.\par \par PLAN:.\par -B/L Breast MRI -now (we will call her with results)\par -B/L dx mammogram and US on 12/2/22\par -f/up after

## 2022-08-22 NOTE — HISTORY OF PRESENT ILLNESS
[FreeTextEntry1] : Frances is a 50 premenopausal F who presents with a US detected L breast cancer, [id number], ER/NC positive, her 2 neg stage 1 breast cancer, s/p L WLE + SLN Bx on 6/3/19, s/p L breast re-excision on 19.\par \par She now presents with a R breast radial scar found on MRI, s/p R WLE on 2020.\par \par -oncotype DX 7\par -no chemotherapy offered\par -s/p L WBI on 19 - 19 \par -s/p tamoxifen since 10/1/19\par \par 2020 -- L WLE \par -radial scar, no atypia \par \par Her work up was as follows: \par 3/13/19 -- L dx tomosynthesis; b/l US \par RIGHT \par -@1N2, mass measuring 1.2 x 1.4 x 0.5 cm, no change, deemed BIRADS 3\par -@6N4, mass measuring 0.6 x 0.9 x 0.4 cm, no change, deemed BIRADS 3\par LEFT: \par -@12N2, cyst measuring 1 x 1 x 0.8 cm \par -@5N5, mass measuring 1.2 x 1.4 x 0.5 cm, no change, deemed BIRADS 3\par -@3-4N4, il defined mass, measuring 0.5 x 0.7 x 0.6 cm, mammographically occult, BIOPSY \par BIRADS 4 of above lesion\par \par 3/18/19 -- L US CNBx @3-4N4\par -micropscopic IDC, well differentiated\par -ER/NC positive, Her 2 pending (Abel )\par \par 19 -- breast MRI \par R: 6.7 cm nonmass enhancement in inferior R breast --> rec BIOPSY of anterior and posterior aspect \par L: lateral mid depth, irregular enhancing mass measuring 0.9 cm consistent with known cancer \par -b/l breast cysts \par -abnormal signal in anterior right lung base \par \par 19 -- CT Chest \par -3 mm solid nodule in RUL \par -rec: no further imaging if patient is low risk \par \par 19 -- R MRI guided biopsy x 2 \par -benign proliferative type FC changes x 2\par \par COLOR genetic testing \par -negative for any mutations \par \par 6/3/19 -- L WLE with SLN Bx\par -IDC, well differentiated, focal lobular features, SBR 1\par -T=9mm\par -EIC, low nuclear grade\par -invasive cancer extends to broadly involve inferior and medial margins\par -cLCIS, radial scar \par -0/5 SLN + for metastatic disease \par -ER/NC pos, Her 2 neg on IHC\par -ONCOTYPE score of 7\par \par 19 -- L breast mass re-excision \par -residual foci of IDC, 2mm, located 0.5 mm from new superior/anterior margin\par -extensive DCIS, low nuclear grade, 1 mm from medial/anterior margin and 1.5 mm from lateral margin \par \par HISTORICAL RISK FACTORS: \par -1 prior breast biopsy as above \par -no family history of breast or ovarian cancer \par - , age at first live birth was 24\par -no prior OCP use \par -COLOR genetic testing negative\par \par INTERVAL HISTORY: \par Frances is a 48 premenopausal F with left breast cancer.  She presents today for a 6 month follow up visit, s/p L WLE with SLN Bx on 6/3/19, s/p L breast mass re-excision on 19.  \par \par Her final pathology revealed a 9mm IDC, well differentiated, SBR 1, extensive DCIS that was low nuclear grade, however the medial and inferior margin were positive.  She had 5 LN removed which were all negative, and the tumor was ER/NC positive, Her 2 negative on IHC, oncotype RS 7. \par \par Her left breast re-excision revealed an additional 2mm of IDC, located 0.5 mm from her new superior/anterior margin and extensive DCIS, located 1 mm from her medial/anterior margin, and 1.5 mm from her lateral margin. \par \par -s/p L WBI completed on 19\par -started tamoxifen on 10/1/19\par \par She has occasional sharp pains and hyperpigmentation from her radiation therapy, but otherwise has not palpated any abnormal masses, denies any nipple discharge, and denies any right sided breast complaints. \par \par She is otherwise tolerating the tamoxifen.  She missed her menses this month, but has not had any abnormal vaginal spotting.  She also is experiencing hot flashes, but denies any shortness of breath, unilateral leg pain or swelling. \par \par She had the following work up since her last visit: \par 2020 -- breast MRI \par -R: @12, mid depth, 0.5 cm enhancing mass, appears indeterminate --> BIOPSY \par -R: 2 foci of susceptibility artifact consistent with prior benign biopsies in inferior R breast, multiple nonenhancing cysts\par -L: post surgical distortion consistent with post lumpectomy changes, no suspicious enhancement \par BIRADS 4\par \par 2020 -- R MRI Biopsy \par -radial scar \par \par 2020 -- L dx mammogram \par -stable post surgical distortion in UOQ, consistent with post lumpectomy changes \par -no suspicious mass, microcalcs, architectural distortion \par BIRADS 2\par \par Her last screening mammogram was performed on 10/29/19 which was deemed BIRADS 2 for stable benign b/l masses. \par \par INTERVAL HISTORY: \carlos Daniels is a 50 F with left breast cancer, right breast radial scar, s/p R WLE for her radial scar on 2020. \par Her final pathology revealed a radial scar without atypia.  \par \par 2020 \carlos Daniels returns for a short term follow up visit  for left breast cancer treated in 2019.  She denies any breast related complaints at this time.  She denies any breast pain, nipple discharge or retraction, and has not palpated any new breast masses. \par \par She is still taking the tamoxifen with minimal side effects, however the interval between her menses is increasing.  \par \par Her most recent imaging was a b/l dx mammogram and US on 11/10/2020 which revealed b/l post surgical changes, and a hypoechoic area in her right breast, retroareolar area at her surgical site, measuring 1.7 x 1.3 cm, for which a 6 month follow up US could be considered, deemed BIRADS 3. \par \par 2021 --\carlos Daniels returns 1 year follow up visit  for left breast cancer treated in 2019.  \par She denies any breast related complaints at this time.  She denies any breast pain, nipple discharge or retraction, and has not palpated any new breast masses. \par \par She has been taking Tamoxifen since 10/01/2019 and is tolerating well.\par \par She had screening MRI in 2021 which did not show suspicious finding. On 21, she had left breast dx mammo and US which showed stable postsurgical changes at the lumpectomy site in superior left breast and no new suspicious mass, microcalcifications or areas of architectural distortion seen in the left breast. \par \par INTERVAL HISTORY 22\carlos Daniels is here for her six months follow up visit \par She has no breast related complaints at this time.  She denies any breast pain, has not palpated any new palpable masses in either breast and denies any nipple discharge or retraction.\par \par Her imaging is as follows:\par 2022  left dx mammo and US\par -breasts are heterogeneously dense\par -Linear marker denotes site of cutaneous scarring in the left breast. \par -There is associated postsurgical distortion.\par \par LEFT US:\par -@1N 5  there is a stable hypoechoic mass measuring 0.5 x 0.5 x 0.3 cm, probably benign.\par -@ 12 to 1:00N 7, there is a stable hypoechoic mass measuring 0.3 x 0.6 x 0.3 cm, probably benign.\par BIRADS3

## 2022-08-24 ENCOUNTER — APPOINTMENT (OUTPATIENT)
Dept: OPHTHALMOLOGY | Facility: CLINIC | Age: 52
End: 2022-08-24

## 2022-08-24 ENCOUNTER — OUTPATIENT (OUTPATIENT)
Dept: OUTPATIENT SERVICES | Facility: HOSPITAL | Age: 52
LOS: 1 days | Discharge: HOME | End: 2022-08-24

## 2022-08-24 DIAGNOSIS — Z98.890 OTHER SPECIFIED POSTPROCEDURAL STATES: Chronic | ICD-10-CM

## 2022-08-24 PROCEDURE — 92012 INTRM OPH EXAM EST PATIENT: CPT

## 2022-09-07 ENCOUNTER — OUTPATIENT (OUTPATIENT)
Dept: OUTPATIENT SERVICES | Facility: HOSPITAL | Age: 52
LOS: 1 days | Discharge: HOME | End: 2022-09-07

## 2022-09-07 ENCOUNTER — APPOINTMENT (OUTPATIENT)
Dept: OPHTHALMOLOGY | Facility: CLINIC | Age: 52
End: 2022-09-07

## 2022-09-07 DIAGNOSIS — Z98.890 OTHER SPECIFIED POSTPROCEDURAL STATES: Chronic | ICD-10-CM

## 2022-09-07 LAB
ALBUMIN SERPL ELPH-MCNC: 4.2 G/DL
ALP BLD-CCNC: 72 U/L
ALT SERPL-CCNC: 11 U/L
ANION GAP SERPL CALC-SCNC: 16 MMOL/L
AST SERPL-CCNC: 16 U/L
BILIRUB SERPL-MCNC: 0.3 MG/DL
BUN SERPL-MCNC: 21 MG/DL
CALCIUM SERPL-MCNC: 9.2 MG/DL
CHLORIDE SERPL-SCNC: 106 MMOL/L
CO2 SERPL-SCNC: 19 MMOL/L
CREAT SERPL-MCNC: 1 MG/DL
EGFR: 69 ML/MIN/1.73M2
GLUCOSE SERPL-MCNC: 90 MG/DL
POTASSIUM SERPL-SCNC: 4.3 MMOL/L
PROT SERPL-MCNC: 7.1 G/DL
SODIUM SERPL-SCNC: 141 MMOL/L

## 2022-09-07 PROCEDURE — 92012 INTRM OPH EXAM EST PATIENT: CPT

## 2022-09-15 PROBLEM — Z00.00 ENCOUNTER FOR PREVENTIVE HEALTH EXAMINATION: Noted: 2022-09-15

## 2022-09-29 ENCOUNTER — RESULT REVIEW (OUTPATIENT)
Age: 52
End: 2022-09-29

## 2022-09-29 ENCOUNTER — OUTPATIENT (OUTPATIENT)
Dept: OUTPATIENT SERVICES | Facility: HOSPITAL | Age: 52
LOS: 1 days | Discharge: HOME | End: 2022-09-29

## 2022-09-29 DIAGNOSIS — Z98.890 OTHER SPECIFIED POSTPROCEDURAL STATES: Chronic | ICD-10-CM

## 2022-09-29 DIAGNOSIS — C50.412 MALIGNANT NEOPLASM OF UPPER-OUTER QUADRANT OF LEFT FEMALE BREAST: ICD-10-CM

## 2022-09-29 PROCEDURE — 77049 MRI BREAST C-+ W/CAD BI: CPT | Mod: 26

## 2022-10-14 ENCOUNTER — OUTPATIENT (OUTPATIENT)
Dept: OUTPATIENT SERVICES | Facility: HOSPITAL | Age: 52
LOS: 1 days | Discharge: HOME | End: 2022-10-14

## 2022-10-14 ENCOUNTER — APPOINTMENT (OUTPATIENT)
Dept: OBGYN | Facility: CLINIC | Age: 52
End: 2022-10-14

## 2022-10-14 VITALS
SYSTOLIC BLOOD PRESSURE: 124 MMHG | BODY MASS INDEX: 36.49 KG/M2 | WEIGHT: 181 LBS | DIASTOLIC BLOOD PRESSURE: 80 MMHG | HEIGHT: 59 IN

## 2022-10-14 DIAGNOSIS — Z98.890 OTHER SPECIFIED POSTPROCEDURAL STATES: Chronic | ICD-10-CM

## 2022-10-14 DIAGNOSIS — Z01.419 ENCOUNTER FOR GYNECOLOGICAL EXAMINATION (GENERAL) (ROUTINE) W/OUT ABNORMAL FINDINGS: ICD-10-CM

## 2022-10-14 PROCEDURE — 99386 PREV VISIT NEW AGE 40-64: CPT

## 2022-10-18 LAB — HPV HIGH+LOW RISK DNA PNL CVX: NOT DETECTED

## 2022-10-19 ENCOUNTER — APPOINTMENT (OUTPATIENT)
Dept: HEMATOLOGY ONCOLOGY | Facility: CLINIC | Age: 52
End: 2022-10-19

## 2022-10-19 ENCOUNTER — OUTPATIENT (OUTPATIENT)
Dept: OUTPATIENT SERVICES | Facility: HOSPITAL | Age: 52
LOS: 1 days | End: 2022-10-19

## 2022-10-19 VITALS
DIASTOLIC BLOOD PRESSURE: 78 MMHG | OXYGEN SATURATION: 99 % | TEMPERATURE: 97.3 F | SYSTOLIC BLOOD PRESSURE: 147 MMHG | BODY MASS INDEX: 36.49 KG/M2 | RESPIRATION RATE: 18 BRPM | WEIGHT: 181 LBS | HEIGHT: 59 IN | HEART RATE: 64 BPM

## 2022-10-19 DIAGNOSIS — Z98.890 OTHER SPECIFIED POSTPROCEDURAL STATES: Chronic | ICD-10-CM

## 2022-10-19 PROCEDURE — 99213 OFFICE O/P EST LOW 20 MIN: CPT

## 2022-10-21 NOTE — ASSESSMENT
[FreeTextEntry1] : 49 yo premenopausal female has IDC of the left breast, stage IA ( [id number]), ER/OR positive, Her 2 neg, G1, s/p lumpectomy and SLNB.\par RS 7, in the low risk range.\par \par Recommendation:\par -- Continue Tamoxifen daily e-refilled today. \par -- Breast exam today reveals stable post treatment change in the left breast. There is no palpable abnormally. She will have left breast dx mammo and US in 12/2022.  \par -- Followup with breast surgeon as scheduled.\par -- Followup with PCP and GYN for health maintenance.\par -- RTO for followup in 6 months.\par \par Case was seen and discussed with Dr. Lozada who agreed with the assessment and plan.\par \par

## 2022-10-21 NOTE — HISTORY OF PRESENT ILLNESS
[de-identified] : 48 yr old female is referred by Dr. Tiwari for consultation of adjuvant systemic therapy for newly diagnosed right breast cancer. The patient felt a palpable lump in her left breast  and underwent diagnostic workup.  \par \par Bilateral dx mammo and US on 3/13/19 revealed right breast mass  - @1N2, mass measuring 1.2 x 1.4 x 0.5 cm, no change, deemed BIRADS 3 -@6N4, mass measuring 0.6 x 0.9 x 0.4 cm, no change, deemed BIRADS 3. LEFT breast @12N2, cyst measuring 1 x 1 x 0.8 cm - @5N5, mass measuring 1.2 x 1.4 x 0.5 cm, no change, deemed BIRADS 3 -@3-4N4, ill defined mass, measuring 0.5 x 0.7 x 0.6 cm, mammographically occult, BIOPSY recommended for BIRADS 4 of above lesion\par \par On 3/18/19, she underwent a left US CNBx @3-4N4 which revealed microscopic IDC, well differentiated ER/ID positive, Her 2 negative\par \par On 19, b/l breast MRI showed Right breast 6.7 cm nonmass enhancement in inferior R breast --> rec BIOPSY of anterior and posterior aspect, and left breast lateral mid depth, irregular enhancing mass measuring 0.9 cm consistent with known cancer \par \par 19 -- CT Chest: 3 mm solid nodule in RUL \par \par 19 -- R MRI guided biopsy x 2 \par -benign proliferative type FC changes x 2\par \par The patient had COLOR genetic testing. She is negative for any mutations.\par \par On 6/3/19, she underwent left WLE with SLN Bx. The pathology showed 9 mm IDC, well differentiated, focal lobular features, low nuclear grade, invasive cancer extends to broadly involve inferior and medial margins. There was cLCIS, radial scar. 5 SLNs were negative for malignancy.  The invasive tumor was ER/ID pos 80%, Her 2 neg on IHC, Ki 67 3-5%.\par \par The surgical specimen was sent for Oncotype dx analysis. her RS is 7, in the low risk range and predicting 3% risk of distant recurrence with endocrine therapy alone.\par \par The patient is previously healthy, no PMH, no family history of breast cancer or other cancers. She is , age at first live birth was 24. No prior OCP use.\par She denies smoking or drinking. She is up to date on pap smear and colonoscopy . Her LMP was on 2019. \par \par She is here to discuss systemic adjuvant therapy. She is able to speak English and understands conversation well. We tried to use  service but there was connection problem.\par \par \par \par \par \par \par \par  [de-identified] : \par 10/3/19\par Patient is here today for follow up visit.  She offers no complaints She completed radiation #21 on 9/17/19 and started Tamoxifen 10/1/19.  She is tolerating Tamoxifen so far.  She continues to get monthly menses. She has a follow up appt with Dr. Tiwari on 10/16/19.\par Previous labs and scans reviewed.  She is due to have a CT Chest for re-evaluation of a 3 mm solid nodule RUL.\par \par 1/2/20:\par Patient is here today for follow up visit.  She completed adjuvant WBRT in 9/2019. She has been taking Tamoxifen since 10/1/19.  She is tolerating Tamoxifen well.  \par She had repeat dx mammo and US in 10/2019 which showed stable architectural distortion most consistent with postsurgical change. Two benign right breast masses and one benign left breast mass as above. No evidence of malignancy. As per post lumpectomy routine, a follow-up unilateral left mammogram is recommended. \par She also had CT chest in 10/2019 which showed stable 3 mm solid nodule RUL. There was no other abnormal finding. She is feeling well and has no breast related symptoms.\par \par 7/2/2020\par 48 yo female is here for follow up visit for h/o  IDC of the left breast, stage IA ( [id number]), ER/IL positive, Her 2 neg, G1, s/p lumpectomy and SLNB. She has been taking Tamoxifen 10/2019 and tolerating it well. She does not c/o any breast pain or lumps.  On 4/28/2020, she had MRI guided biopsy which showed complex sclerosing lesion (radial scar) with focal peripheral micropapillomatous architecture.  She is scheduled for a surgical excision of right breast on 7/14/2020.  She has regular menstrual periods.  LMP was 6/2020.\par Mammogram: 4/30/2020 shows:\par Stable postsurgical distortion in the left breast upper outer quadrant consistent with prior lumpectomy. No suspicious mass, microcalcifications or areas of architectural distortion is seen either breast. When compared to the previous examinations there is no significant interval change and nothing to suggest malignancy. \par Impression: No mammographic evidence of malignancy. Stable postsurgical changes of the left breast. \par MRI breasts 4/21/2020 shows:\par Postsurgical changes of the left breast, consistent with lumpectomy changes.  At the 12:00 axis of the right breast, there is a 0.5 cm indeterminate \par enhancing mass for which MRI guided biopsy is recommended. Recommendation: MRI guided biopsy. \par MRI Right breast bx: 4/28/2020 pathology shows:\par -Complex sclerosing lesion (radial scar) with focal peripheral micropapillomatous architecture. \par -Benign breast tissue with proliferative type fibrocystic changes including florid duct hyperplasia associated with collagenous spherulosis, stromal \par fibrosis, sclerosing and blunt duct adenosis, apocrine metaplasia-lined cysts, and microcalcifications. \par -Focal mammary duct ectasia. \par This is benign, high risk, concordant histology. Surgical excision is recommended. \par \par 01/04/2021\par Patient is here today for follow up visit h/o IDC of the left breast, stage IA ( [id number]), ER/IL positive, Her 2 neg, G1, s/p lumpectomy and SLNB. She has been taking Tamoxifen since 10/1/19. She is tolerating Tamoxifen well.  On 4/28/2020, she had MRI guided biopsy which showed complex sclerosing lesion (radial scar) with focal peripheral micropapillomatous architecture.  She is s/p R WLE on 7/14/2020, final pathology revealing a radial scar, no atypia. Her most recent imaging was a b/l dx mammogram and US on 11/10/2020 which revealed b/l post surgical changes, and a hypoechoic area in her right breast, retroareolar area at her surgical site, measuring 1.7 x 1.3 cm, for which a 6 month follow up US could be considered, deemed BIRADS 3. She will be due for a L dx mammogram and US on 5/10/2021. She will also be due for a breast MRI on 4/21/21. \par Last LMP was 10/2020. \par \par 7/8/21:\par Patient is here today for follow up visit. She has stage IA ( [id number]) IDC of the left breast, ER/IL positive, Her 2 neg, G1, s/p lumpectomy and SLNB. She has been taking Tamoxifen since 10/1/19. She is tolerating Tamoxifen well.  She had screening MRI in 4/2021 which did not show suspicious finding. On 5/11/21, she had left breast dx mammo and US which showed stable postsurgical changes at the lumpectomy site in superior left breast and no new suspicious mass, microcalcifications or areas of architectural distortion seen in the left breast. The annual screening mammography recommended. She still has her periods.\par \par 1/10/22:\par Patient is here today for follow up visit. She has stage IA ( [id number]) IDC of the left breast, ER/IL positive, Her 2 neg, G1, s/p lumpectomy and SLNB. She has been taking Tamoxifen since 10/1/19. She is tolerating Tamoxifen well. She had screening MRI in 4/2021 which did not show suspicious finding. On 12/1/2, she had b/l breast dx mammo and US which showed bilateral stable postsurgical changes. No sonographic evidence of malignancy of the right breast. Probably benign left breast masses for which short-term follow-up is recommended. She has not had her period since 6/2021.\par \par 10/19/22\par Patient is here today for follow up visit. She has stage IA ( [id number]) IDC of the left breast, ER/IL positive, Her 2 neg, G1, s/p lumpectomy and SLNB. She has been taking Tamoxifen since 10/1/19. She is tolerating Tamoxifen well. In June 2022 she had left dx mammo and US which showed at 1:00 5cm from the nipple there is a stable hypoechoic mass measuring 0.5 x 0.5 x 0.3 cm, probably benign. 12 to 1:00N 7cm from the nipple, there is a stable hypoechoic mass measuring 0.3 x 0.6 x 0.3 cm, probably benign. She had MRI in 9/2022 which did not show suspicious finding. Her LMP 1/2022. \par \par \par \par

## 2022-10-21 NOTE — PHYSICAL EXAM
[Fully active, able to carry on all pre-disease performance without restriction] : Status 0 - Fully active, able to carry on all pre-disease performance without restriction [Normal Male] : prostate smooth, symmetric with no modularity or induration [Normal] : normal appearance, no rash, nodules, vesicles, ulcers, erythema [de-identified] : Status post left lumpectomy and SLNB. The surgical scars are healing well. There is no palpable abnormality. post radiation erythema noted to left breast, no tenderness to palpation. S/p right breast excision

## 2022-10-24 DIAGNOSIS — Z51.81 ENCOUNTER FOR THERAPEUTIC DRUG LEVEL MONITORING: ICD-10-CM

## 2022-10-24 DIAGNOSIS — C50.412 MALIGNANT NEOPLASM OF UPPER-OUTER QUADRANT OF LEFT FEMALE BREAST: ICD-10-CM

## 2022-10-31 ENCOUNTER — OUTPATIENT (OUTPATIENT)
Dept: OUTPATIENT SERVICES | Facility: HOSPITAL | Age: 52
LOS: 1 days | Discharge: HOME | End: 2022-10-31

## 2022-10-31 ENCOUNTER — APPOINTMENT (OUTPATIENT)
Dept: INTERNAL MEDICINE | Facility: CLINIC | Age: 52
End: 2022-10-31

## 2022-10-31 VITALS
OXYGEN SATURATION: 97 % | SYSTOLIC BLOOD PRESSURE: 125 MMHG | BODY MASS INDEX: 35.88 KG/M2 | HEIGHT: 59 IN | WEIGHT: 178 LBS | DIASTOLIC BLOOD PRESSURE: 81 MMHG | TEMPERATURE: 98.1 F | HEART RATE: 68 BPM

## 2022-10-31 DIAGNOSIS — Z98.890 OTHER SPECIFIED POSTPROCEDURAL STATES: Chronic | ICD-10-CM

## 2022-10-31 LAB
25(OH)D3 SERPL-MCNC: 20 NG/ML
BASOPHILS # BLD AUTO: 0.03 K/UL
BASOPHILS NFR BLD AUTO: 0.4 %
CHOLEST SERPL-MCNC: 174 MG/DL
CREAT SPEC-SCNC: 95 MG/DL
EOSINOPHIL # BLD AUTO: 0.28 K/UL
EOSINOPHIL NFR BLD AUTO: 4.2 %
ESTIMATED AVERAGE GLUCOSE: 131 MG/DL
HBA1C MFR BLD HPLC: 6.2 %
HCT VFR BLD CALC: 36.5 %
HCV AB SER QL: NONREACTIVE
HCV S/CO RATIO: 0.11 S/CO
HDLC SERPL-MCNC: 61 MG/DL
HGB BLD-MCNC: 11.7 G/DL
HIV1+2 AB SPEC QL IA.RAPID: NONREACTIVE
IMM GRANULOCYTES NFR BLD AUTO: 0.3 %
LDLC SERPL CALC-MCNC: 87 MG/DL
LYMPHOCYTES # BLD AUTO: 2.29 K/UL
LYMPHOCYTES NFR BLD AUTO: 34.2 %
MAN DIFF?: NORMAL
MCHC RBC-ENTMCNC: 27.4 PG
MCHC RBC-ENTMCNC: 32.1 G/DL
MCV RBC AUTO: 85.5 FL
MICROALBUMIN 24H UR DL<=1MG/L-MCNC: 10.4 MG/DL
MICROALBUMIN/CREAT 24H UR-RTO: 110 MG/G
MONOCYTES # BLD AUTO: 0.57 K/UL
MONOCYTES NFR BLD AUTO: 8.5 %
NEUTROPHILS # BLD AUTO: 3.5 K/UL
NEUTROPHILS NFR BLD AUTO: 52.4 %
NONHDLC SERPL-MCNC: 113 MG/DL
PLATELET # BLD AUTO: 191 K/UL
RBC # BLD: 4.27 M/UL
RBC # FLD: 13.3 %
TRIGL SERPL-MCNC: 129 MG/DL
TSH SERPL-ACNC: 3.08 UIU/ML
WBC # FLD AUTO: 6.69 K/UL

## 2022-10-31 PROCEDURE — 99214 OFFICE O/P EST MOD 30 MIN: CPT

## 2022-10-31 NOTE — REVIEW OF SYSTEMS
[Fever] : no fever [Chills] : no chills [Fatigue] : no fatigue [Night Sweats] : no night sweats [Recent Change In Weight] : ~T no recent weight change [Discharge] : no discharge [Redness] : no redness [Dryness] : no dryness  [Vision Problems] : no vision problems [Itching] : no itching [Earache] : no earache [Hearing Loss] : no hearing loss [Nasal Discharge] : no nasal discharge [Sore Throat] : no sore throat [Chest Pain] : no chest pain [Palpitations] : no palpitations [Leg Claudication] : no leg claudication [Orthopnea] : no orthopnea [Shortness Of Breath] : no shortness of breath [Wheezing] : no wheezing [Cough] : no cough [Abdominal Pain] : no abdominal pain [Nausea] : no nausea [Constipation] : no constipation [Vomiting] : no vomiting [Heartburn] : no heartburn [Melena] : no melena [Dysuria] : no dysuria [Incontinence] : no incontinence [Hematuria] : no hematuria [Frequency] : no frequency [Joint Pain] : no joint pain [Joint Stiffness] : no joint stiffness [Muscle Pain] : no muscle pain [Back Pain] : no back pain [Itching] : no itching [Skin Rash] : no skin rash [Headache] : no headache [Dizziness] : no dizziness [Memory Loss] : no memory loss [Unsteady Walking] : no ataxia
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
## 2022-10-31 NOTE — PHYSICAL EXAM
[No Acute Distress] : no acute distress [Well Nourished] : well nourished [Well Developed] : well developed [Normal Sclera/Conjunctiva] : normal sclera/conjunctiva [PERRL] : pupils equal round and reactive to light [Normal Outer Ear/Nose] : the outer ears and nose were normal in appearance [Normal Oropharynx] : the oropharynx was normal [No JVD] : no jugular venous distention [No Lymphadenopathy] : no lymphadenopathy [Supple] : supple [No Respiratory Distress] : no respiratory distress  [No Accessory Muscle Use] : no accessory muscle use [Clear to Auscultation] : lungs were clear to auscultation bilaterally [Normal Rate] : normal rate  [Regular Rhythm] : with a regular rhythm [Normal S1, S2] : normal S1 and S2 [No Carotid Bruits] : no carotid bruits [No Abdominal Bruit] : a ~M bruit was not heard ~T in the abdomen [No Edema] : there was no peripheral edema [Soft] : abdomen soft [Non Tender] : non-tender [Non-distended] : non-distended [No HSM] : no HSM [Normal Bowel Sounds] : normal bowel sounds [No CVA Tenderness] : no CVA  tenderness [No Joint Swelling] : no joint swelling [No Rash] : no rash [Coordination Grossly Intact] : coordination grossly intact
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
## 2022-11-01 DIAGNOSIS — E55.9 VITAMIN D DEFICIENCY, UNSPECIFIED: ICD-10-CM

## 2022-11-01 DIAGNOSIS — R73.03 PREDIABETES: ICD-10-CM

## 2022-11-01 DIAGNOSIS — Z23 ENCOUNTER FOR IMMUNIZATION: ICD-10-CM

## 2022-11-02 LAB — CYTOLOGY CVX/VAG DOC THIN PREP: ABNORMAL

## 2022-11-09 ENCOUNTER — APPOINTMENT (OUTPATIENT)
Dept: NUTRITION | Facility: CLINIC | Age: 52
End: 2022-11-09

## 2022-11-09 ENCOUNTER — OUTPATIENT (OUTPATIENT)
Dept: OUTPATIENT SERVICES | Facility: HOSPITAL | Age: 52
LOS: 1 days | Discharge: HOME | End: 2022-11-09

## 2022-11-09 DIAGNOSIS — Z98.890 OTHER SPECIFIED POSTPROCEDURAL STATES: Chronic | ICD-10-CM

## 2022-12-05 ENCOUNTER — RESULT REVIEW (OUTPATIENT)
Age: 52
End: 2022-12-05

## 2022-12-05 ENCOUNTER — OUTPATIENT (OUTPATIENT)
Dept: OUTPATIENT SERVICES | Facility: HOSPITAL | Age: 52
LOS: 1 days | Discharge: HOME | End: 2022-12-05

## 2022-12-05 DIAGNOSIS — R92.8 OTHER ABNORMAL AND INCONCLUSIVE FINDINGS ON DIAGNOSTIC IMAGING OF BREAST: ICD-10-CM

## 2022-12-05 DIAGNOSIS — Z98.890 OTHER SPECIFIED POSTPROCEDURAL STATES: Chronic | ICD-10-CM

## 2022-12-05 PROCEDURE — G0279: CPT | Mod: 26

## 2022-12-05 PROCEDURE — 76641 ULTRASOUND BREAST COMPLETE: CPT | Mod: 26,50

## 2022-12-05 PROCEDURE — 77066 DX MAMMO INCL CAD BI: CPT | Mod: 26

## 2022-12-09 ENCOUNTER — OUTPATIENT (OUTPATIENT)
Dept: OUTPATIENT SERVICES | Facility: HOSPITAL | Age: 52
LOS: 1 days | Discharge: HOME | End: 2022-12-09

## 2022-12-09 ENCOUNTER — APPOINTMENT (OUTPATIENT)
Dept: NUTRITION | Facility: CLINIC | Age: 52
End: 2022-12-09

## 2022-12-09 DIAGNOSIS — Z98.890 OTHER SPECIFIED POSTPROCEDURAL STATES: Chronic | ICD-10-CM

## 2023-01-09 ENCOUNTER — APPOINTMENT (OUTPATIENT)
Dept: BREAST CENTER | Facility: CLINIC | Age: 53
End: 2023-01-09
Payer: MEDICAID

## 2023-01-09 VITALS
DIASTOLIC BLOOD PRESSURE: 83 MMHG | HEIGHT: 59 IN | WEIGHT: 178 LBS | SYSTOLIC BLOOD PRESSURE: 128 MMHG | BODY MASS INDEX: 35.88 KG/M2

## 2023-01-09 PROCEDURE — 99212 OFFICE O/P EST SF 10 MIN: CPT

## 2023-01-09 NOTE — ASSESSMENT
[FreeTextEntry1] : Frances is a 52 premenopausal F with an US detected L breast cancer, [id number], ER/VT positive, her 2 neg stage 1 breast cancer, s/p L WLE + SLN Bx on 6/3/19, s/p L breast mass re-excision on 7/8/19 with negative margins. stage 1 A breast cancer; now with RIGHT breast radial scar, detected on breast MRI, s/p R WLE on 7/14/2020, final pathology revealing a radial scar. \par She is also here with dx of left breast BIRADS3 abnormality \par -s/p  L WLE and SLN Bx on 6/3/19\par --final pathology revealed positive inferior and medial margins\par \par s/p L breast re-excision on 7/8/19\par -final pathology revealed negative surgical margins\par \par ONCOTYPE RS 7\par -no chemotherapy \par \par s/p L WBI, completed on 9/12/19\par s/p tamoxifen, started on 10/1/19\par \par 7/14/2020 -- R WLE \par -radial scar, no atypia \par \par On exam, I was not able to palpate any suspicious abnormalities within either breast.   \par \par Her imaging is as follows:\par 12/05/2022 b/l dx mammo and US\par -breasts are heterogeneously dense\par -Stable postsurgical changes noted in both breasts.\par \par LEFT US:\par -@12-1 o'clock N7 there is a stable hypoechoic mass measuring 0.3 x 0.6 x 0.3 cm. Short interval follow-up recommended.\par -@1:00 N5 there is a stable hypoechoic mass measuring 0.4 x 0.4 x 0.3 cm. Short interval follow-up recommended.\par BIRADS3\par \par \par We also discussed BIRADS 3 lesions.  These lesions have a 2% chance of harboring malignancy.  There is always an option to obtain a tissue sample with a biopsy to confirm the diagnosis.   The procedure was described in detail including the placement of a tissue marker clip.  The risks of the procedure, including but not limited to bleeding and infection were also explained.  She is not interested in biopsy at this time and agrees to continue with short-term interval imaging.\par \par We discussed dense breasts.  Increasing breast density has been found to increase ones risk of breast cancer, but at this time, there is no clear indication for additional imaging in this setting, as both US and MRI have not been found to improve survival.  One can consider bilateral screening US.  However, out of 1000 women screened, the use of routine US will only identify an additional 3-5 cancers.  The use of US was found to increase the likelihood of undergoing more imaging and more biopsies.  She does have dense breasts.  We have decided to proceed with screening bilateral breast US at this time.  This will be scheduled with her next screening mammogram.\par \par \par AS REVIEW: \par We discussed radial scars without atypia.  These lesions are considered fibroproliferative lesions without atypia.  Patients with these lesions were found to have a slightly increased relative risk compared to the reference population.  However the lesions themselves do not have any malignant potential. There is about a 10-20% upgrade rate to a high risk lesion (including atypical hyperplasias) and a <3% upgrade to cancer.  However, when atypia is present there is up to a 15-25% upgrade rate to cancer.  For this reason, we have discussed observation vs. surgical excision of this lesion.  I have recommended that we move forward with surgical excision at this time.\par \par AS REVIEW:\par Her final surgical margins were negative, although she did have widespread DCIS which had close margins, but not positive.  There is no indication for further surgical excision at this time. \par \par She underwent a breast MRI which revealed nonmass enhancement spanning 6.7 cm in her inferior RIGHT breast which was biopsy proven benign proliferative type fibrocystic changes.  No additional abnormalities were noted in her left breast.  We will repeat the MRI 4/19/2020.\par \par She also had a chest CT which revealed a 3 mm solid nodule in her RUL for which no further imaging was indicated as she is a low risk patient.  However, per Dr. Lozada's last note, she will be scheduled for a repeat CT Chest. \par \par SHe also underwent COLOR genetic panel testing which was negative for any mutations. \par \par All of her questions were answered.  She knows to call with any further questions or concerns.\par \par PLAN:\par -left US on 6/6/23\par -B/L Breast MRI -in September 2023\par -f/up after

## 2023-01-09 NOTE — HISTORY OF PRESENT ILLNESS
[FreeTextEntry1] : Frances is a 50 premenopausal F who presents with a US detected L breast cancer, [id number], ER/AK positive, her 2 neg stage 1 breast cancer, s/p L WLE + SLN Bx on 6/3/19, s/p L breast re-excision on 19.\par \par She now presents with a R breast radial scar found on MRI, s/p R WLE on 2020.\par \par -oncotype DX 7\par -no chemotherapy offered\par -s/p L WBI on 19 - 19 \par -s/p tamoxifen since 10/1/19\par \par 2020 -- L WLE \par -radial scar, no atypia \par \par Her work up was as follows: \par 3/13/19 -- L dx tomosynthesis; b/l US \par RIGHT \par -@1N2, mass measuring 1.2 x 1.4 x 0.5 cm, no change, deemed BIRADS 3\par -@6N4, mass measuring 0.6 x 0.9 x 0.4 cm, no change, deemed BIRADS 3\par LEFT: \par -@12N2, cyst measuring 1 x 1 x 0.8 cm \par -@5N5, mass measuring 1.2 x 1.4 x 0.5 cm, no change, deemed BIRADS 3\par -@3-4N4, il defined mass, measuring 0.5 x 0.7 x 0.6 cm, mammographically occult, BIOPSY \par BIRADS 4 of above lesion\par \par 3/18/19 -- L US CNBx @3-4N4\par -micropscopic IDC, well differentiated\par -ER/AK positive, Her 2 pending (Abel )\par \par 19 -- breast MRI \par R: 6.7 cm nonmass enhancement in inferior R breast --> rec BIOPSY of anterior and posterior aspect \par L: lateral mid depth, irregular enhancing mass measuring 0.9 cm consistent with known cancer \par -b/l breast cysts \par -abnormal signal in anterior right lung base \par \par 19 -- CT Chest \par -3 mm solid nodule in RUL \par -rec: no further imaging if patient is low risk \par \par 19 -- R MRI guided biopsy x 2 \par -benign proliferative type FC changes x 2\par \par COLOR genetic testing \par -negative for any mutations \par \par 6/3/19 -- L WLE with SLN Bx\par -IDC, well differentiated, focal lobular features, SBR 1\par -T=9mm\par -EIC, low nuclear grade\par -invasive cancer extends to broadly involve inferior and medial margins\par -cLCIS, radial scar \par -0/5 SLN + for metastatic disease \par -ER/AK pos, Her 2 neg on IHC\par -ONCOTYPE score of 7\par \par 19 -- L breast mass re-excision \par -residual foci of IDC, 2mm, located 0.5 mm from new superior/anterior margin\par -extensive DCIS, low nuclear grade, 1 mm from medial/anterior margin and 1.5 mm from lateral margin \par \par HISTORICAL RISK FACTORS: \par -1 prior breast biopsy as above \par -no family history of breast or ovarian cancer \par - , age at first live birth was 24\par -no prior OCP use \par -COLOR genetic testing negative\par \par INTERVAL HISTORY: \par Frances is a 48 premenopausal F with left breast cancer.  She presents today for a 6 month follow up visit, s/p L WLE with SLN Bx on 6/3/19, s/p L breast mass re-excision on 19.  \par \par Her final pathology revealed a 9mm IDC, well differentiated, SBR 1, extensive DCIS that was low nuclear grade, however the medial and inferior margin were positive.  She had 5 LN removed which were all negative, and the tumor was ER/AK positive, Her 2 negative on IHC, oncotype RS 7. \par \par Her left breast re-excision revealed an additional 2mm of IDC, located 0.5 mm from her new superior/anterior margin and extensive DCIS, located 1 mm from her medial/anterior margin, and 1.5 mm from her lateral margin. \par \par -s/p L WBI completed on 19\par -started tamoxifen on 10/1/19\par \par She has occasional sharp pains and hyperpigmentation from her radiation therapy, but otherwise has not palpated any abnormal masses, denies any nipple discharge, and denies any right sided breast complaints. \par \par She is otherwise tolerating the tamoxifen.  She missed her menses this month, but has not had any abnormal vaginal spotting.  She also is experiencing hot flashes, but denies any shortness of breath, unilateral leg pain or swelling. \par \par She had the following work up since her last visit: \par 2020 -- breast MRI \par -R: @12, mid depth, 0.5 cm enhancing mass, appears indeterminate --> BIOPSY \par -R: 2 foci of susceptibility artifact consistent with prior benign biopsies in inferior R breast, multiple nonenhancing cysts\par -L: post surgical distortion consistent with post lumpectomy changes, no suspicious enhancement \par BIRADS 4\par \par 2020 -- R MRI Biopsy \par -radial scar \par \par 2020 -- L dx mammogram \par -stable post surgical distortion in UOQ, consistent with post lumpectomy changes \par -no suspicious mass, microcalcs, architectural distortion \par BIRADS 2\par \par Her last screening mammogram was performed on 10/29/19 which was deemed BIRADS 2 for stable benign b/l masses. \par \par INTERVAL HISTORY: \carlos Daniels is a 50 F with left breast cancer, right breast radial scar, s/p R WLE for her radial scar on 2020. \par Her final pathology revealed a radial scar without atypia.  \par \par 2020 \carlos Daniels returns for a short term follow up visit  for left breast cancer treated in 2019.  She denies any breast related complaints at this time.  She denies any breast pain, nipple discharge or retraction, and has not palpated any new breast masses. \par \par She is still taking the tamoxifen with minimal side effects, however the interval between her menses is increasing.  \par \par Her most recent imaging was a b/l dx mammogram and US on 11/10/2020 which revealed b/l post surgical changes, and a hypoechoic area in her right breast, retroareolar area at her surgical site, measuring 1.7 x 1.3 cm, for which a 6 month follow up US could be considered, deemed BIRADS 3. \par \par 2021 --\carlos Daniels returns 1 year follow up visit  for left breast cancer treated in 2019.  \par She denies any breast related complaints at this time.  She denies any breast pain, nipple discharge or retraction, and has not palpated any new breast masses. \par \par She has been taking Tamoxifen since 10/01/2019 and is tolerating well.\par \par She had screening MRI in 2021 which did not show suspicious finding. On 21, she had left breast dx mammo and US which showed stable postsurgical changes at the lumpectomy site in superior left breast and no new suspicious mass, microcalcifications or areas of architectural distortion seen in the left breast. \par \par INTERVAL HISTORY 22\par Frances is here for her six months follow up visit \par She has no breast related complaints at this time.  She denies any breast pain, has not palpated any new palpable masses in either breast and denies any nipple discharge or retraction.\par \par Her imaging is as follows:\par 2022  left dx mammo and US\par -breasts are heterogeneously dense\par -Linear marker denotes site of cutaneous scarring in the left breast. \par -There is associated postsurgical distortion.\par \par LEFT US:\par -@1N 5  there is a stable hypoechoic mass measuring 0.5 x 0.5 x 0.3 cm, probably benign.\par -@ 12 to 1:00N 7, there is a stable hypoechoic mass measuring 0.3 x 0.6 x 0.3 cm, probably benign.\par BIRADS3\par \par \par INTERVAL HISTORY 23\par Frances is here for her six months follow up visit \par She has no breast related complaints at this time.  She denies any breast pain, has not palpated any new palpable masses in either breast and denies any nipple discharge or retraction.\par \par Her imaging is as follows:\par 2022 b/l dx mammo and US\par -breasts are heterogeneously dense\par -Stable postsurgical changes noted in both breasts.\par \par LEFT US:\par -@12-1 o'clock N7 there is a stable hypoechoic mass measuring 0.3 x 0.6 x 0.3 cm. Short interval follow-up recommended.\par -@1:00 N5 there is a stable hypoechoic mass measuring 0.4 x 0.4 x 0.3 cm. Short interval follow-up recommended.\par BIRADS3\par \par

## 2023-01-09 NOTE — DATA REVIEWED
[FreeTextEntry1] : ACC: [id number]     EXAM:  MG US BREAST COMPLETE BI\par ACC: [id number]     EXAM:  MG MAMMO DIAG W [facility] BI#  12/05/2022\par \par \par \par INTERPRETATION:  Clinical History / Reason for exam: Follow-up left breast mass.\par \par Additional history: Left breast malignancy status post lumpectomy 2019.\par \par The patient reports last clinical breast examination was performed about: Within the past year.\par \par Family history of breast cancer: There is no family history of breast cancer.\par \par Comparisons: Mammograms dating back to 2020. Breast ultrasound dating back to 2021.\par \par Views obtained: bilateral full field digital 2D and digital tomosynthesis images as well as magnification views.\par \par Computer-aided detection was utilized in the interpretation of this examination.\par \par Breast composition: The breasts are heterogeneously dense, which may obscure small masses.\par \par Findings:\par \par Mammogram:\par \par No suspicious mass, microcalcifications or areas of architectural distortion seen in either breast. Stable postsurgical changes noted in both breasts.\par \par Ultrasound:\par \par Bilateral whole breast ultrasound was performed.\par At 12-1 o'clock N7 there is a stable hypoechoic mass measuring 0.3 x 0.6 x 0.3 cm. Short interval follow-up recommended.\par At 1:00 N5 there is a stable hypoechoic mass measuring 0.4 x 0.4 x 0.3 cm. Short interval follow-up recommended.\par There is no other solid or cystic mass noted in either breast. No right or left axillary lymphadenopathy.\par \par Impression:\par 1. Stable left breast masses as above. Short interval follow-up recommended.\par 2. No mammographic evidence of malignancy in the right breast, continue annual screening right breast.\par \par Recommendation: Follow-up breast ultrasound in 6 months.\par \par BI-RADS category 3: Probably Benign\par

## 2023-01-09 NOTE — PHYSICAL EXAM
[Normocephalic] : normocephalic [Atraumatic] : atraumatic [EOMI] : extra ocular movement intact [Examined in the supine and seated position] : examined in the supine and seated position [Symmetrical] : symmetrical [No dominant masses] : no dominant masses in right breast  [No dominant masses] : no dominant masses left breast [No Nipple Retraction] : no left nipple retraction [No Nipple Discharge] : no left nipple discharge [No Axillary Lymphadenopathy] : no left axillary lymphadenopathy [No Edema] : no edema [No Rashes] : no rashes [No Ulceration] : no ulceration [de-identified] :  hyperpigmentation from her history of radiation therapy

## 2023-01-28 NOTE — BRIEF OPERATIVE NOTE - CONDITION POST OP
CONST: nontoxic NAD speaking in full sentences  HEAD: atraumatic  EYES: conjunctivae clear, PERRL, EOMI  ENT: mmm  NECK: supple/FROM, nttp  CARD: rrr   CHEST: ctab no r/r/w, no stridor/retractions/tripoding  ABD: gravid  EXT: FROM, symmetric distal pulses intact  SKIN: warm, dry, no rash, no pedal edema/ttp/rash, cap refill <2sec  NEURO: awake alert answering questions following commands moving all extremities
stable

## 2023-03-17 ENCOUNTER — APPOINTMENT (OUTPATIENT)
Dept: GASTROENTEROLOGY | Facility: CLINIC | Age: 53
End: 2023-03-17

## 2023-03-24 ENCOUNTER — APPOINTMENT (OUTPATIENT)
Dept: NUTRITION | Facility: CLINIC | Age: 53
End: 2023-03-24

## 2023-04-19 ENCOUNTER — APPOINTMENT (OUTPATIENT)
Dept: HEMATOLOGY ONCOLOGY | Facility: CLINIC | Age: 53
End: 2023-04-19

## 2023-04-19 ENCOUNTER — APPOINTMENT (OUTPATIENT)
Dept: HEMATOLOGY ONCOLOGY | Facility: CLINIC | Age: 53
End: 2023-04-19
Payer: MEDICAID

## 2023-04-19 ENCOUNTER — OUTPATIENT (OUTPATIENT)
Dept: OUTPATIENT SERVICES | Facility: HOSPITAL | Age: 53
LOS: 1 days | End: 2023-04-19
Payer: MEDICAID

## 2023-04-19 VITALS
HEART RATE: 99 BPM | WEIGHT: 179 LBS | OXYGEN SATURATION: 99 % | TEMPERATURE: 98 F | HEIGHT: 59 IN | RESPIRATION RATE: 20 BRPM | SYSTOLIC BLOOD PRESSURE: 134 MMHG | DIASTOLIC BLOOD PRESSURE: 72 MMHG | BODY MASS INDEX: 36.08 KG/M2

## 2023-04-19 DIAGNOSIS — C50.412 MALIGNANT NEOPLASM OF UPPER-OUTER QUADRANT OF LEFT FEMALE BREAST: ICD-10-CM

## 2023-04-19 DIAGNOSIS — Z98.890 OTHER SPECIFIED POSTPROCEDURAL STATES: Chronic | ICD-10-CM

## 2023-04-19 PROCEDURE — 99214 OFFICE O/P EST MOD 30 MIN: CPT

## 2023-04-20 DIAGNOSIS — C50.412 MALIGNANT NEOPLASM OF UPPER-OUTER QUADRANT OF LEFT FEMALE BREAST: ICD-10-CM

## 2023-04-21 ENCOUNTER — OUTPATIENT (OUTPATIENT)
Dept: OUTPATIENT SERVICES | Facility: HOSPITAL | Age: 53
LOS: 1 days | End: 2023-04-21
Payer: MEDICAID

## 2023-04-21 ENCOUNTER — APPOINTMENT (OUTPATIENT)
Dept: NUTRITION | Facility: CLINIC | Age: 53
End: 2023-04-21

## 2023-04-21 DIAGNOSIS — Z98.890 OTHER SPECIFIED POSTPROCEDURAL STATES: Chronic | ICD-10-CM

## 2023-04-21 DIAGNOSIS — E66.9 OBESITY, UNSPECIFIED: ICD-10-CM

## 2023-04-21 DIAGNOSIS — Z00.00 ENCOUNTER FOR GENERAL ADULT MEDICAL EXAMINATION WITHOUT ABNORMAL FINDINGS: ICD-10-CM

## 2023-04-21 PROCEDURE — 97803 MED NUTRITION INDIV SUBSEQ: CPT

## 2023-04-25 NOTE — ASSESSMENT
[FreeTextEntry1] : 47 yo premenopausal female has IDC of the left breast, stage IA ( [id number]), ER/OK positive, Her 2 neg, G1, s/p lumpectomy and SLNB.\par RS 7, in the low risk range.\par \par Recommendation:\par -- Continue Tamoxifen daily e-refilled today. \par -- Breast exam today reveals stable post treatment change in the left breast. There is no palpable abnormally. She will have left breast  US in 06/2023 and dx mammo 12/2023.  \par -- Followup with breast surgeon as scheduled.\par -- Followup with PCP scheduled for  05/2023 and GYN scheduled for 11/2023 for health maintenance.\par -- RTO for followup in 6 months.\par -- Consider menopausal work up at next visit \par \par \par \par

## 2023-04-25 NOTE — HISTORY OF PRESENT ILLNESS
[de-identified] : 48 yr old female is referred by Dr. Tiwari for consultation of adjuvant systemic therapy for newly diagnosed right breast cancer. The patient felt a palpable lump in her left breast  and underwent diagnostic workup.  \par \par Bilateral dx mammo and US on 3/13/19 revealed right breast mass  - @1N2, mass measuring 1.2 x 1.4 x 0.5 cm, no change, deemed BIRADS 3 -@6N4, mass measuring 0.6 x 0.9 x 0.4 cm, no change, deemed BIRADS 3. LEFT breast @12N2, cyst measuring 1 x 1 x 0.8 cm - @5N5, mass measuring 1.2 x 1.4 x 0.5 cm, no change, deemed BIRADS 3 -@3-4N4, ill defined mass, measuring 0.5 x 0.7 x 0.6 cm, mammographically occult, BIOPSY recommended for BIRADS 4 of above lesion\par \par On 3/18/19, she underwent a left US CNBx @3-4N4 which revealed microscopic IDC, well differentiated ER/MT positive, Her 2 negative\par \par On 19, b/l breast MRI showed Right breast 6.7 cm nonmass enhancement in inferior R breast --> rec BIOPSY of anterior and posterior aspect, and left breast lateral mid depth, irregular enhancing mass measuring 0.9 cm consistent with known cancer \par \par 19 -- CT Chest: 3 mm solid nodule in RUL \par \par 19 -- R MRI guided biopsy x 2 \par -benign proliferative type FC changes x 2\par \par The patient had COLOR genetic testing. She is negative for any mutations.\par \par On 6/3/19, she underwent left WLE with SLN Bx. The pathology showed 9 mm IDC, well differentiated, focal lobular features, low nuclear grade, invasive cancer extends to broadly involve inferior and medial margins. There was cLCIS, radial scar. 5 SLNs were negative for malignancy.  The invasive tumor was ER/MT pos 80%, Her 2 neg on IHC, Ki 67 3-5%.\par \par The surgical specimen was sent for Oncotype dx analysis. her RS is 7, in the low risk range and predicting 3% risk of distant recurrence with endocrine therapy alone.\par \par The patient is previously healthy, no PMH, no family history of breast cancer or other cancers. She is , age at first live birth was 24. No prior OCP use.\par She denies smoking or drinking. She is up to date on pap smear and colonoscopy . Her LMP was on 2019. \par \par She is here to discuss systemic adjuvant therapy. She is able to speak English and understands conversation well. We tried to use  service but there was connection problem.\par \par \par \par \par \par \par \par  [de-identified] : \par 10/3/19\par Patient is here today for follow up visit.  She offers no complaints She completed radiation #21 on 9/17/19 and started Tamoxifen 10/1/19.  She is tolerating Tamoxifen so far.  She continues to get monthly menses. She has a follow up appt with Dr. Tiwari on 10/16/19.\par Previous labs and scans reviewed.  She is due to have a CT Chest for re-evaluation of a 3 mm solid nodule RUL.\par \par 1/2/20:\par Patient is here today for follow up visit.  She completed adjuvant WBRT in 9/2019. She has been taking Tamoxifen since 10/1/19.  She is tolerating Tamoxifen well.  \par She had repeat dx mammo and US in 10/2019 which showed stable architectural distortion most consistent with postsurgical change. Two benign right breast masses and one benign left breast mass as above. No evidence of malignancy. As per post lumpectomy routine, a follow-up unilateral left mammogram is recommended. \par She also had CT chest in 10/2019 which showed stable 3 mm solid nodule RUL. There was no other abnormal finding. She is feeling well and has no breast related symptoms.\par \par 7/2/2020\par 48 yo female is here for follow up visit for h/o  IDC of the left breast, stage IA ( [id number]), ER/NV positive, Her 2 neg, G1, s/p lumpectomy and SLNB. She has been taking Tamoxifen 10/2019 and tolerating it well. She does not c/o any breast pain or lumps.  On 4/28/2020, she had MRI guided biopsy which showed complex sclerosing lesion (radial scar) with focal peripheral micropapillomatous architecture.  She is scheduled for a surgical excision of right breast on 7/14/2020.  She has regular menstrual periods.  LMP was 6/2020.\par Mammogram: 4/30/2020 shows:\par Stable postsurgical distortion in the left breast upper outer quadrant consistent with prior lumpectomy. No suspicious mass, microcalcifications or areas of architectural distortion is seen either breast. When compared to the previous examinations there is no significant interval change and nothing to suggest malignancy. \par Impression: No mammographic evidence of malignancy. Stable postsurgical changes of the left breast. \par MRI breasts 4/21/2020 shows:\par Postsurgical changes of the left breast, consistent with lumpectomy changes.  At the 12:00 axis of the right breast, there is a 0.5 cm indeterminate \par enhancing mass for which MRI guided biopsy is recommended. Recommendation: MRI guided biopsy. \par MRI Right breast bx: 4/28/2020 pathology shows:\par -Complex sclerosing lesion (radial scar) with focal peripheral micropapillomatous architecture. \par -Benign breast tissue with proliferative type fibrocystic changes including florid duct hyperplasia associated with collagenous spherulosis, stromal \par fibrosis, sclerosing and blunt duct adenosis, apocrine metaplasia-lined cysts, and microcalcifications. \par -Focal mammary duct ectasia. \par This is benign, high risk, concordant histology. Surgical excision is recommended. \par \par 01/04/2021\par Patient is here today for follow up visit h/o IDC of the left breast, stage IA ( [id number]), ER/NV positive, Her 2 neg, G1, s/p lumpectomy and SLNB. She has been taking Tamoxifen since 10/1/19. She is tolerating Tamoxifen well.  On 4/28/2020, she had MRI guided biopsy which showed complex sclerosing lesion (radial scar) with focal peripheral micropapillomatous architecture.  She is s/p R WLE on 7/14/2020, final pathology revealing a radial scar, no atypia. Her most recent imaging was a b/l dx mammogram and US on 11/10/2020 which revealed b/l post surgical changes, and a hypoechoic area in her right breast, retroareolar area at her surgical site, measuring 1.7 x 1.3 cm, for which a 6 month follow up US could be considered, deemed BIRADS 3. She will be due for a L dx mammogram and US on 5/10/2021. She will also be due for a breast MRI on 4/21/21. \par Last LMP was 10/2020. \par \par 7/8/21:\par Patient is here today for follow up visit. She has stage IA ( [id number]) IDC of the left breast, ER/NV positive, Her 2 neg, G1, s/p lumpectomy and SLNB. She has been taking Tamoxifen since 10/1/19. She is tolerating Tamoxifen well.  She had screening MRI in 4/2021 which did not show suspicious finding. On 5/11/21, she had left breast dx mammo and US which showed stable postsurgical changes at the lumpectomy site in superior left breast and no new suspicious mass, microcalcifications or areas of architectural distortion seen in the left breast. The annual screening mammography recommended. She still has her periods.\par \par 1/10/22:\par Patient is here today for follow up visit. She has stage IA ( [id number]) IDC of the left breast, ER/NV positive, Her 2 neg, G1, s/p lumpectomy and SLNB. She has been taking Tamoxifen since 10/1/19. She is tolerating Tamoxifen well. She had screening MRI in 4/2021 which did not show suspicious finding. On 12/1/2, she had b/l breast dx mammo and US which showed bilateral stable postsurgical changes. No sonographic evidence of malignancy of the right breast. Probably benign left breast masses for which short-term follow-up is recommended. She has not had her period since 6/2021.\par \par 10/19/22\par Patient is here today for follow up visit. She has stage IA ( [id number]) IDC of the left breast, ER/NV positive, Her 2 neg, G1, s/p lumpectomy and SLNB. She has been taking Tamoxifen since 10/1/19. She is tolerating Tamoxifen well. In June 2022 she had left dx mammo and US which showed at 1:00 5cm from the nipple there is a stable hypoechoic mass measuring 0.5 x 0.5 x 0.3 cm, probably benign. 12 to 1:00N 7cm from the nipple, there is a stable hypoechoic mass measuring 0.3 x 0.6 x 0.3 cm, probably benign. She had MRI in 9/2022 which did not show suspicious finding. Her LMP 1/2022. \par \par 04/19/2023\par Patient is here today for follow up visit. She has stage IA ( [id number]) IDC of the left breast, ER/NV positive, Her 2 neg, G1, s/p lumpectomy and SLNB. She has been taking Tamoxifen since 10/1/19. She is tolerating Tamoxifen well. In June 2022 she had left dx mammo and US which showed at 1:00 5cm from the nipple there is a stable hypoechoic mass measuring 0.5 x 0.5 x 0.3 cm, probably benign. 12 to 1:00N 7cm from the nipple, there is a stable hypoechoic mass measuring 0.3 x 0.6 x 0.3 cm, probably benign. She had MRI in 9/2022 which did not show suspicious finding scheduled for left breast US  on 06/06/2023. Her LMP 1/2022.\par \par \par \par

## 2023-04-25 NOTE — PHYSICAL EXAM
[Fully active, able to carry on all pre-disease performance without restriction] : Status 0 - Fully active, able to carry on all pre-disease performance without restriction [Normal Male] : prostate smooth, symmetric with no modularity or induration [Normal] : normal appearance, no rash, nodules, vesicles, ulcers, erythema [de-identified] : Status post left lumpectomy and SLNB. The surgical scars are healing well. There is no palpable abnormality. post radiation erythema noted to left breast, no tenderness to palpation. S/p right breast excision

## 2023-05-10 DIAGNOSIS — Z51.81 ENCOUNTER FOR THERAPEUTIC DRUG LEVEL MONITORING: ICD-10-CM

## 2023-05-10 NOTE — VITALS
79
78
[90: Able to carry normal activity; minor signs or symptoms of disease.] : 90: Able to carry normal activity; minor signs or symptoms of disease.

## 2023-05-30 ENCOUNTER — OUTPATIENT (OUTPATIENT)
Dept: OUTPATIENT SERVICES | Facility: HOSPITAL | Age: 53
LOS: 1 days | End: 2023-05-30
Payer: MEDICAID

## 2023-05-30 ENCOUNTER — APPOINTMENT (OUTPATIENT)
Dept: INTERNAL MEDICINE | Facility: CLINIC | Age: 53
End: 2023-05-30

## 2023-05-30 ENCOUNTER — APPOINTMENT (OUTPATIENT)
Dept: INTERNAL MEDICINE | Facility: CLINIC | Age: 53
End: 2023-05-30
Payer: MEDICAID

## 2023-05-30 VITALS
HEART RATE: 75 BPM | WEIGHT: 178 LBS | TEMPERATURE: 97.2 F | DIASTOLIC BLOOD PRESSURE: 79 MMHG | SYSTOLIC BLOOD PRESSURE: 118 MMHG | HEIGHT: 59 IN | OXYGEN SATURATION: 98 % | BODY MASS INDEX: 35.88 KG/M2

## 2023-05-30 DIAGNOSIS — Z00.00 ENCOUNTER FOR GENERAL ADULT MEDICAL EXAMINATION WITHOUT ABNORMAL FINDINGS: ICD-10-CM

## 2023-05-30 DIAGNOSIS — Z98.890 OTHER SPECIFIED POSTPROCEDURAL STATES: Chronic | ICD-10-CM

## 2023-05-30 LAB
25(OH)D3 SERPL-MCNC: 14 NG/ML
ALBUMIN SERPL ELPH-MCNC: 4 G/DL
ALP BLD-CCNC: 66 U/L
ALT SERPL-CCNC: 14 U/L
ANION GAP SERPL CALC-SCNC: 12 MMOL/L
AST SERPL-CCNC: 17 U/L
BILIRUB SERPL-MCNC: 0.3 MG/DL
BUN SERPL-MCNC: 23 MG/DL
CALCIUM SERPL-MCNC: 9.1 MG/DL
CHLORIDE SERPL-SCNC: 109 MMOL/L
CHOLEST SERPL-MCNC: 169 MG/DL
CO2 SERPL-SCNC: 21 MMOL/L
CREAT SERPL-MCNC: 1 MG/DL
EGFR: 68 ML/MIN/1.73M2
ESTIMATED AVERAGE GLUCOSE: 108 MG/DL
GLUCOSE SERPL-MCNC: 87 MG/DL
HBA1C MFR BLD HPLC: 5.4 %
HDLC SERPL-MCNC: 66 MG/DL
LDLC SERPL CALC-MCNC: 74 MG/DL
NONHDLC SERPL-MCNC: 103 MG/DL
POTASSIUM SERPL-SCNC: 4.3 MMOL/L
PROT SERPL-MCNC: 6.7 G/DL
SODIUM SERPL-SCNC: 142 MMOL/L
TRIGL SERPL-MCNC: 144 MG/DL

## 2023-05-30 PROCEDURE — 99214 OFFICE O/P EST MOD 30 MIN: CPT

## 2023-05-30 RX ORDER — POLYETHYLENE GLYCOL 3350 AND ELECTROLYTES WITH LEMON FLAVOR 236; 22.74; 6.74; 5.86; 2.97 G/4L; G/4L; G/4L; G/4L; G/4L
236 POWDER, FOR SOLUTION ORAL
Qty: 1 | Refills: 0 | Status: DISCONTINUED | COMMUNITY
Start: 2022-02-02 | End: 2023-05-30

## 2023-05-30 RX ORDER — CHOLECALCIFEROL (VITAMIN D3) 1250 MCG
1.25 MG CAPSULE ORAL
Qty: 8 | Refills: 0 | Status: ACTIVE | COMMUNITY
Start: 2022-02-02 | End: 1900-01-01

## 2023-05-30 NOTE — REVIEW OF SYSTEMS
[Fever] : no fever [Chills] : no chills [Fatigue] : no fatigue [Pain] : no pain [Vision Problems] : no vision problems [Nasal Discharge] : no nasal discharge [Sore Throat] : no sore throat [Chest Pain] : no chest pain [Palpitations] : no palpitations [Shortness Of Breath] : no shortness of breath [Cough] : no cough [Abdominal Pain] : no abdominal pain [Nausea] : no nausea [Joint Pain] : no joint pain [Muscle Pain] : no muscle pain [Itching] : no itching [Skin Rash] : no skin rash [Headache] : no headache [Dizziness] : no dizziness

## 2023-05-30 NOTE — ASSESSMENT
[FreeTextEntry1] : Patient is 52 year old with hx of stage IA ( [id number]) IDC of the left breast, ER/AK positive, Her 2 neg, G1, s/p right breast lumpectomy and SLNB on tamoxifen. \par \par # Breast Cancer IDC s/p nakul lumpectomy \par - on tamoxifen\par - s/p radiation therapy completed\par - follows with Dr. Tiwari and Dr. Lozada\par -left US on 6/6/23 as previous mammo found mass in left breast BIRADs 3\par -B/L Breast MRI -in September 2023\par \par # Vitamin D def\par - vitD level 14 \par - taking V-D supplementation 50 mcg 1 tab daily -> switch to 50,000 U qwk \par \par # Normocytic Anemia \par - hgb 11.7\par - f/u iron panel, b12 and folate \par \par # Pre diabetes\par - A1c 5.2 -> 5.4% \par - advised on diet and exercise\par \par # Health maintenance\par - patient referred to GI for colonoscopy\par - pap smear 10/14/2022 normal \par - labs and fu in 6 months\par - COVID shots uptodate. \par rto in 6 months andprn

## 2023-05-30 NOTE — PHYSICAL EXAM
[No Acute Distress] : no acute distress [Well Nourished] : well nourished [Well Developed] : well developed [Normal Sclera/Conjunctiva] : normal sclera/conjunctiva [EOMI] : extraocular movements intact [Normal Outer Ear/Nose] : the outer ears and nose were normal in appearance [Normal Oropharynx] : the oropharynx was normal [No JVD] : no jugular venous distention [Supple] : supple [No Respiratory Distress] : no respiratory distress  [No Accessory Muscle Use] : no accessory muscle use [Clear to Auscultation] : lungs were clear to auscultation bilaterally [Normal Rate] : normal rate  [Regular Rhythm] : with a regular rhythm [No Varicosities] : no varicosities [No Edema] : there was no peripheral edema [Soft] : abdomen soft [Non Tender] : non-tender [Normal Bowel Sounds] : normal bowel sounds [No Joint Swelling] : no joint swelling [No Rash] : no rash [No Skin Lesions] : no skin lesions [Coordination Grossly Intact] : coordination grossly intact [No Focal Deficits] : no focal deficits [Normal Gait] : normal gait [Normal Affect] : the affect was normal [Normal Insight/Judgement] : insight and judgment were intact

## 2023-05-30 NOTE — HISTORY OF PRESENT ILLNESS
[FreeTextEntry1] : follow up  [de-identified] : Patient is 52 year old with hx of stage IA ( [id number]) IDC of the left breast, ER/IN positive, Her 2 neg, G1, s/p right breast lumpectomy and SLNB on tamoxifen. Has no complaints.

## 2023-05-31 DIAGNOSIS — E55.9 VITAMIN D DEFICIENCY, UNSPECIFIED: ICD-10-CM

## 2023-05-31 DIAGNOSIS — R73.03 PREDIABETES: ICD-10-CM

## 2023-06-06 ENCOUNTER — RESULT REVIEW (OUTPATIENT)
Age: 53
End: 2023-06-06

## 2023-06-06 ENCOUNTER — OUTPATIENT (OUTPATIENT)
Dept: OUTPATIENT SERVICES | Facility: HOSPITAL | Age: 53
LOS: 1 days | End: 2023-06-06
Payer: MEDICAID

## 2023-06-06 DIAGNOSIS — Z98.890 OTHER SPECIFIED POSTPROCEDURAL STATES: Chronic | ICD-10-CM

## 2023-06-06 DIAGNOSIS — R92.8 OTHER ABNORMAL AND INCONCLUSIVE FINDINGS ON DIAGNOSTIC IMAGING OF BREAST: ICD-10-CM

## 2023-06-06 DIAGNOSIS — Z00.8 ENCOUNTER FOR OTHER GENERAL EXAMINATION: ICD-10-CM

## 2023-06-06 PROCEDURE — 76642 ULTRASOUND BREAST LIMITED: CPT | Mod: 26,LT

## 2023-06-06 PROCEDURE — 76642 ULTRASOUND BREAST LIMITED: CPT | Mod: LT

## 2023-06-07 DIAGNOSIS — R92.8 OTHER ABNORMAL AND INCONCLUSIVE FINDINGS ON DIAGNOSTIC IMAGING OF BREAST: ICD-10-CM

## 2023-07-13 ENCOUNTER — APPOINTMENT (OUTPATIENT)
Dept: NUTRITION | Facility: CLINIC | Age: 53
End: 2023-07-13

## 2023-07-25 NOTE — HISTORY OF PRESENT ILLNESS
[de-identified] : \par 10/3/19\par Patient is here today for follow up visit.  She offers no complaints She completed radiation #21 on 9/17/19 and started Tamoxifen 10/1/19.  She is tolerating Tamoxifen so far.  She continues to get monthly menses. She has a follow up appt with Dr. Tiwari on 10/16/19.\par Previous labs and scans reviewed.  She is due to have a CT Chest for re-evaluation of a 3 mm solid nodule RUL.\par \par 1/2/20:\par Patient is here today for follow up visit.  She completed adjuvant WBRT in 9/2019. She has been taking Tamoxifen since 10/1/19.  She is tolerating Tamoxifen well.  \par She had repeat dx mammo and US in 10/2019 which showed stable architectural distortion most consistent with postsurgical change. Two benign right breast masses and one benign left breast mass as above. No evidence of malignancy. As per post lumpectomy routine, a follow-up unilateral left mammogram is recommended. \par She also had CT chest in 10/2019 which showed stable 3 mm solid nodule RUL. There was no other abnormal finding. She is feeling well and has no breast related symptoms.\par \par 7/2/2020\par 48 yo female is here for follow up visit for h/o  IDC of the left breast, stage IA ( [id number]), ER/SC positive, Her 2 neg, G1, s/p lumpectomy and SLNB. She has been taking Tamoxifen 10/2019 and tolerating it well. She does not c/o any breast pain or lumps.  On 4/28/2020, she had MRI guided biopsy which showed complex sclerosing lesion (radial scar) with focal peripheral micropapillomatous architecture.  She is scheduled for a surgical excision of right breast on 7/14/2020.  She has regular menstrual periods.  LMP was 6/2020.\par Mammogram: 4/30/2020 shows:\par Stable postsurgical distortion in the left breast upper outer quadrant consistent with prior lumpectomy. No suspicious mass, microcalcifications or areas of architectural distortion is seen either breast. When compared to the previous examinations there is no significant interval change and nothing to suggest malignancy. \par Impression: No mammographic evidence of malignancy. Stable postsurgical changes of the left breast. \par MRI breasts 4/21/2020 shows:\par Postsurgical changes of the left breast, consistent with lumpectomy changes.  At the 12:00 axis of the right breast, there is a 0.5 cm indeterminate \par enhancing mass for which MRI guided biopsy is recommended. Recommendation: MRI guided biopsy. \par MRI Right breast bx: 4/28/2020 pathology shows:\par -Complex sclerosing lesion (radial scar) with focal peripheral micropapillomatous architecture. \par -Benign breast tissue with proliferative type fibrocystic changes including florid duct hyperplasia associated with collagenous spherulosis, stromal \par fibrosis, sclerosing and blunt duct adenosis, apocrine metaplasia-lined cysts, and microcalcifications. \par -Focal mammary duct ectasia. \par This is benign, high risk, concordant histology. Surgical excision is recommended. \par \par  [de-identified] : 48 yr old female is referred by Dr. Tiwari for consultation of adjuvant systemic therapy for newly diagnosed right breast cancer. The patient felt a palpable lump in her left breast  and underwent diagnostic workup.  \par \par Bilateral dx mammo and US on 3/13/19 revealed right breast mass  - @1N2, mass measuring 1.2 x 1.4 x 0.5 cm, no change, deemed BIRADS 3 -@6N4, mass measuring 0.6 x 0.9 x 0.4 cm, no change, deemed BIRADS 3. LEFT breast @12N2, cyst measuring 1 x 1 x 0.8 cm - @5N5, mass measuring 1.2 x 1.4 x 0.5 cm, no change, deemed BIRADS 3 -@3-4N4, ill defined mass, measuring 0.5 x 0.7 x 0.6 cm, mammographically occult, BIOPSY recommended for BIRADS 4 of above lesion\par \par On 3/18/19, she underwent a left US CNBx @3-4N4 which revealed microscopic IDC, well differentiated ER/NC positive, Her 2 negative\par \par On 19, b/l breast MRI showed Right breast 6.7 cm nonmass enhancement in inferior R breast --> rec BIOPSY of anterior and posterior aspect, and left breast lateral mid depth, irregular enhancing mass measuring 0.9 cm consistent with known cancer \par \par 19 -- CT Chest: 3 mm solid nodule in RUL \par \par 19 -- R MRI guided biopsy x 2 \par -benign proliferative type FC changes x 2\par \par The patient had COLOR genetic testing. She is negative for any mutations.\par \par On 6/3/19, she underwent left WLE with SLN Bx. The pathology showed 9 mm IDC, well differentiated, focal lobular features, low nuclear grade, invasive cancer extends to broadly involve inferior and medial margins. There was cLCIS, radial scar. 5 SLNs were negative for malignancy.  The invasive tumor was ER/NC pos 80%, Her 2 neg on IHC, Ki 67 3-5%.\par \par The surgical specimen was sent for Oncotype dx analysis. her RS is 7, in the low risk range and predicting 3% risk of distant recurrence with endocrine therapy alone.\par \par The patient is previously healthy, no PMH, no family history of breast cancer or other cancers. She is , age at first live birth was 24. No prior OCP use.\par She denies smoking or drinking. She is up to date on pap smear and colonoscopy . Her LMP was on 2019. \par \par She is here to discuss systemic adjuvant therapy. She is able to speak English and understands conversation well. We tried to use  service but there was connection problem.\par \par \par \par \par \par \par \par  Wheelchair/Stroller

## 2023-08-17 NOTE — ASU PATIENT PROFILE, ADULT - NPO AFTER
23:59 Cimzia Pregnancy And Lactation Text: This medication crosses the placenta but can be considered safe in certain situations. Cimzia may be excreted in breast milk.

## 2023-09-20 ENCOUNTER — APPOINTMENT (OUTPATIENT)
Dept: GASTROENTEROLOGY | Facility: CLINIC | Age: 53
End: 2023-09-20

## 2023-09-25 ENCOUNTER — APPOINTMENT (OUTPATIENT)
Dept: BREAST CENTER | Facility: CLINIC | Age: 53
End: 2023-09-25
Payer: MEDICAID

## 2023-09-25 VITALS
WEIGHT: 178 LBS | TEMPERATURE: 98.2 F | BODY MASS INDEX: 35.88 KG/M2 | HEIGHT: 59 IN | DIASTOLIC BLOOD PRESSURE: 78 MMHG | HEART RATE: 66 BPM | SYSTOLIC BLOOD PRESSURE: 147 MMHG

## 2023-09-25 DIAGNOSIS — N64.89 OTHER SPECIFIED DISORDERS OF BREAST: ICD-10-CM

## 2023-09-25 PROCEDURE — 99212 OFFICE O/P EST SF 10 MIN: CPT

## 2023-10-12 ENCOUNTER — APPOINTMENT (OUTPATIENT)
Dept: HEMATOLOGY ONCOLOGY | Facility: CLINIC | Age: 53
End: 2023-10-12
Payer: MEDICAID

## 2023-10-12 ENCOUNTER — OUTPATIENT (OUTPATIENT)
Dept: OUTPATIENT SERVICES | Facility: HOSPITAL | Age: 53
LOS: 1 days | End: 2023-10-12
Payer: MEDICAID

## 2023-10-12 VITALS
RESPIRATION RATE: 19 BRPM | HEART RATE: 67 BPM | BODY MASS INDEX: 35.88 KG/M2 | WEIGHT: 178 LBS | SYSTOLIC BLOOD PRESSURE: 135 MMHG | HEIGHT: 59 IN | DIASTOLIC BLOOD PRESSURE: 70 MMHG | OXYGEN SATURATION: 98 %

## 2023-10-12 DIAGNOSIS — C50.412 MALIGNANT NEOPLASM OF UPPER-OUTER QUADRANT OF LEFT FEMALE BREAST: ICD-10-CM

## 2023-10-12 DIAGNOSIS — Z98.890 OTHER SPECIFIED POSTPROCEDURAL STATES: Chronic | ICD-10-CM

## 2023-10-12 DIAGNOSIS — Z51.81 ENCOUNTER FOR THERAPEUTIC DRUG LEVEL MONITORING: ICD-10-CM

## 2023-10-12 PROCEDURE — 99213 OFFICE O/P EST LOW 20 MIN: CPT

## 2023-10-27 ENCOUNTER — OUTPATIENT (OUTPATIENT)
Dept: OUTPATIENT SERVICES | Facility: HOSPITAL | Age: 53
LOS: 1 days | End: 2023-10-27
Payer: MEDICAID

## 2023-10-27 ENCOUNTER — APPOINTMENT (OUTPATIENT)
Dept: OBGYN | Facility: CLINIC | Age: 53
End: 2023-10-27

## 2023-10-27 ENCOUNTER — APPOINTMENT (OUTPATIENT)
Dept: OBGYN | Facility: CLINIC | Age: 53
End: 2023-10-27
Payer: MEDICAID

## 2023-10-27 VITALS
HEIGHT: 59 IN | WEIGHT: 179 LBS | BODY MASS INDEX: 36.08 KG/M2 | DIASTOLIC BLOOD PRESSURE: 58 MMHG | SYSTOLIC BLOOD PRESSURE: 106 MMHG

## 2023-10-27 DIAGNOSIS — Z98.890 OTHER SPECIFIED POSTPROCEDURAL STATES: Chronic | ICD-10-CM

## 2023-10-27 DIAGNOSIS — Z01.419 ENCOUNTER FOR GYNECOLOGICAL EXAMINATION (GENERAL) (ROUTINE) WITHOUT ABNORMAL FINDINGS: ICD-10-CM

## 2023-10-27 PROCEDURE — 99213 OFFICE O/P EST LOW 20 MIN: CPT

## 2023-10-30 DIAGNOSIS — Z01.419 ENCOUNTER FOR GYNECOLOGICAL EXAMINATION (GENERAL) (ROUTINE) WITHOUT ABNORMAL FINDINGS: ICD-10-CM

## 2023-11-14 ENCOUNTER — OUTPATIENT (OUTPATIENT)
Dept: OUTPATIENT SERVICES | Facility: HOSPITAL | Age: 53
LOS: 1 days | End: 2023-11-14
Payer: MEDICAID

## 2023-11-14 DIAGNOSIS — Z98.890 OTHER SPECIFIED POSTPROCEDURAL STATES: Chronic | ICD-10-CM

## 2023-11-14 DIAGNOSIS — Z00.00 ENCOUNTER FOR GENERAL ADULT MEDICAL EXAMINATION WITHOUT ABNORMAL FINDINGS: ICD-10-CM

## 2023-11-14 LAB
CHOLEST SERPL-MCNC: 172 MG/DL
ESTIMATED AVERAGE GLUCOSE: 108 MG/DL
HBA1C MFR BLD HPLC: 5.4 %
HDLC SERPL-MCNC: 71 MG/DL
IRON SATN MFR SERPL: 25 %
IRON SERPL-MCNC: 84 UG/DL
LDLC SERPL CALC-MCNC: 72 MG/DL
NONHDLC SERPL-MCNC: 101 MG/DL
TIBC SERPL-MCNC: 331 UG/DL
TRIGL SERPL-MCNC: 145 MG/DL
TSH SERPL-ACNC: 3.57 UIU/ML
UIBC SERPL-MCNC: 247 UG/DL

## 2023-11-14 PROCEDURE — 83540 ASSAY OF IRON: CPT

## 2023-11-14 PROCEDURE — 80053 COMPREHEN METABOLIC PANEL: CPT

## 2023-11-14 PROCEDURE — 80061 LIPID PANEL: CPT

## 2023-11-14 PROCEDURE — 82306 VITAMIN D 25 HYDROXY: CPT

## 2023-11-14 PROCEDURE — 82728 ASSAY OF FERRITIN: CPT

## 2023-11-14 PROCEDURE — 82607 VITAMIN B-12: CPT

## 2023-11-14 PROCEDURE — 84443 ASSAY THYROID STIM HORMONE: CPT

## 2023-11-14 PROCEDURE — 83550 IRON BINDING TEST: CPT

## 2023-11-14 PROCEDURE — 36415 COLL VENOUS BLD VENIPUNCTURE: CPT

## 2023-11-14 PROCEDURE — 82746 ASSAY OF FOLIC ACID SERUM: CPT

## 2023-11-14 PROCEDURE — 83036 HEMOGLOBIN GLYCOSYLATED A1C: CPT

## 2023-11-14 PROCEDURE — 85027 COMPLETE CBC AUTOMATED: CPT

## 2023-11-15 DIAGNOSIS — Z00.00 ENCOUNTER FOR GENERAL ADULT MEDICAL EXAMINATION WITHOUT ABNORMAL FINDINGS: ICD-10-CM

## 2023-11-15 LAB
25(OH)D3 SERPL-MCNC: 33 NG/ML
ALBUMIN SERPL ELPH-MCNC: 4.5 G/DL
ALP BLD-CCNC: 234 U/L
ALT SERPL-CCNC: 22 U/L
ANION GAP SERPL CALC-SCNC: 13 MMOL/L
AST SERPL-CCNC: 21 U/L
BILIRUB SERPL-MCNC: 0.3 MG/DL
BUN SERPL-MCNC: 23 MG/DL
CALCIUM SERPL-MCNC: 9.7 MG/DL
CHLORIDE SERPL-SCNC: 108 MMOL/L
CO2 SERPL-SCNC: 20 MMOL/L
CREAT SERPL-MCNC: 1 MG/DL
EGFR: 68 ML/MIN/1.73M2
FERRITIN SERPL-MCNC: 241 NG/ML
FOLATE SERPL-MCNC: 11.5 NG/ML
GLUCOSE SERPL-MCNC: 86 MG/DL
POTASSIUM SERPL-SCNC: 4.2 MMOL/L
PROT SERPL-MCNC: 7.3 G/DL
SODIUM SERPL-SCNC: 141 MMOL/L
VIT B12 SERPL-MCNC: 444 PG/ML

## 2023-11-28 ENCOUNTER — APPOINTMENT (OUTPATIENT)
Dept: INTERNAL MEDICINE | Facility: CLINIC | Age: 53
End: 2023-11-28
Payer: MEDICAID

## 2023-11-28 ENCOUNTER — OUTPATIENT (OUTPATIENT)
Dept: OUTPATIENT SERVICES | Facility: HOSPITAL | Age: 53
LOS: 1 days | End: 2023-11-28
Payer: MEDICAID

## 2023-11-28 VITALS
DIASTOLIC BLOOD PRESSURE: 87 MMHG | HEART RATE: 70 BPM | HEIGHT: 59 IN | OXYGEN SATURATION: 99 % | SYSTOLIC BLOOD PRESSURE: 174 MMHG | BODY MASS INDEX: 34.88 KG/M2 | WEIGHT: 173 LBS | TEMPERATURE: 98.6 F

## 2023-11-28 VITALS — SYSTOLIC BLOOD PRESSURE: 96 MMHG | DIASTOLIC BLOOD PRESSURE: 62 MMHG

## 2023-11-28 VITALS — SYSTOLIC BLOOD PRESSURE: 153 MMHG | DIASTOLIC BLOOD PRESSURE: 84 MMHG

## 2023-11-28 DIAGNOSIS — E55.9 VITAMIN D DEFICIENCY, UNSPECIFIED: ICD-10-CM

## 2023-11-28 DIAGNOSIS — Z23 ENCOUNTER FOR IMMUNIZATION: ICD-10-CM

## 2023-11-28 DIAGNOSIS — R92.8 OTHER ABNORMAL AND INCONCLUSIVE FINDINGS ON DIAGNOSTIC IMAGING OF BREAST: ICD-10-CM

## 2023-11-28 DIAGNOSIS — Z98.890 OTHER SPECIFIED POSTPROCEDURAL STATES: Chronic | ICD-10-CM

## 2023-11-28 DIAGNOSIS — Z00.00 ENCOUNTER FOR GENERAL ADULT MEDICAL EXAMINATION WITHOUT ABNORMAL FINDINGS: ICD-10-CM

## 2023-11-28 DIAGNOSIS — E66.01 MORBID (SEVERE) OBESITY DUE TO EXCESS CALORIES: ICD-10-CM

## 2023-11-28 DIAGNOSIS — R73.03 PREDIABETES.: ICD-10-CM

## 2023-11-28 PROCEDURE — 99214 OFFICE O/P EST MOD 30 MIN: CPT

## 2023-11-28 PROCEDURE — 90471 IMMUNIZATION ADMIN: CPT | Mod: 25

## 2023-11-30 DIAGNOSIS — E66.01 MORBID (SEVERE) OBESITY DUE TO EXCESS CALORIES: ICD-10-CM

## 2023-11-30 DIAGNOSIS — E55.9 VITAMIN D DEFICIENCY, UNSPECIFIED: ICD-10-CM

## 2023-11-30 DIAGNOSIS — Z23 ENCOUNTER FOR IMMUNIZATION: ICD-10-CM

## 2023-11-30 DIAGNOSIS — R92.8 OTHER ABNORMAL AND INCONCLUSIVE FINDINGS ON DIAGNOSTIC IMAGING OF BREAST: ICD-10-CM

## 2023-11-30 DIAGNOSIS — R73.03 PREDIABETES: ICD-10-CM

## 2023-12-08 ENCOUNTER — RESULT REVIEW (OUTPATIENT)
Age: 53
End: 2023-12-08

## 2023-12-08 ENCOUNTER — OUTPATIENT (OUTPATIENT)
Dept: OUTPATIENT SERVICES | Facility: HOSPITAL | Age: 53
LOS: 1 days | End: 2023-12-08
Payer: MEDICAID

## 2023-12-08 DIAGNOSIS — Z98.890 OTHER SPECIFIED POSTPROCEDURAL STATES: Chronic | ICD-10-CM

## 2023-12-08 DIAGNOSIS — R92.2 INCONCLUSIVE MAMMOGRAM: ICD-10-CM

## 2023-12-08 DIAGNOSIS — Z12.31 ENCOUNTER FOR SCREENING MAMMOGRAM FOR MALIGNANT NEOPLASM OF BREAST: ICD-10-CM

## 2023-12-08 PROCEDURE — 77067 SCR MAMMO BI INCL CAD: CPT

## 2023-12-08 PROCEDURE — 77067 SCR MAMMO BI INCL CAD: CPT | Mod: 26

## 2023-12-08 PROCEDURE — 76641 ULTRASOUND BREAST COMPLETE: CPT | Mod: 26,50

## 2023-12-08 PROCEDURE — 76641 ULTRASOUND BREAST COMPLETE: CPT | Mod: 50

## 2023-12-08 PROCEDURE — 77063 BREAST TOMOSYNTHESIS BI: CPT

## 2023-12-08 PROCEDURE — 77063 BREAST TOMOSYNTHESIS BI: CPT | Mod: 26

## 2023-12-09 DIAGNOSIS — R92.2 INCONCLUSIVE MAMMOGRAM: ICD-10-CM

## 2023-12-09 DIAGNOSIS — Z12.31 ENCOUNTER FOR SCREENING MAMMOGRAM FOR MALIGNANT NEOPLASM OF BREAST: ICD-10-CM

## 2023-12-18 ENCOUNTER — APPOINTMENT (OUTPATIENT)
Dept: BREAST CENTER | Facility: CLINIC | Age: 53
End: 2023-12-18
Payer: MEDICAID

## 2023-12-18 VITALS
BODY MASS INDEX: 34.88 KG/M2 | WEIGHT: 173 LBS | HEIGHT: 59 IN | DIASTOLIC BLOOD PRESSURE: 79 MMHG | SYSTOLIC BLOOD PRESSURE: 160 MMHG

## 2023-12-18 VITALS — HEIGHT: 59 IN | BODY MASS INDEX: 35.88 KG/M2 | WEIGHT: 178 LBS

## 2023-12-18 DIAGNOSIS — R92.30 DENSE BREASTS, UNSPECIFIED: ICD-10-CM

## 2023-12-18 DIAGNOSIS — Z92.3 PERSONAL HISTORY OF IRRADIATION: ICD-10-CM

## 2023-12-18 DIAGNOSIS — N60.19 DIFFUSE CYSTIC MASTOPATHY OF UNSPECIFIED BREAST: ICD-10-CM

## 2023-12-18 PROCEDURE — 99213 OFFICE O/P EST LOW 20 MIN: CPT

## 2023-12-18 NOTE — PHYSICAL EXAM
[Normocephalic] : normocephalic [Atraumatic] : atraumatic [EOMI] : extra ocular movement intact [Examined in the supine and seated position] : examined in the supine and seated position [Symmetrical] : symmetrical [No dominant masses] : no dominant masses in right breast  [No dominant masses] : no dominant masses left breast [No Nipple Retraction] : no left nipple retraction [No Nipple Discharge] : no left nipple discharge [No Axillary Lymphadenopathy] : no left axillary lymphadenopathy [No Edema] : no edema [No Rashes] : no rashes [No Ulceration] : no ulceration [de-identified] : On physical exam, there are no discrete masses in either breast or axilla. There is no nipple discharge or inversion bilaterally. There are no skin changes bilaterally.

## 2023-12-18 NOTE — PAST MEDICAL HISTORY
[History of Hormone Replacement Treatment] : has no history of hormone replacement treatment [FreeTextEntry6] : denies [FreeTextEntry5] : denies  [FreeTextEntry7] : denies  [FreeTextEntry8] : yes in past

## 2023-12-18 NOTE — DATA REVIEWED
[FreeTextEntry1] :    EXAM:  MG MAMMO SCREEN W CRISTHIAN BI#   INTERPRETATION:  HISTORY: Bilateral MG MAMMO SCREEN W CRISTHIAN BI# was performed. Patient is 53 years old and is seen for screening.  The patient has a history of right excision in July, 2020 - high risk, left lumpectomy in June, 2019 - malignant and right MRI guided biopsy - high risk. The patient has no family history of breast cancer.  RISK ASSESSMENT: Tyrer-Cuzick Lifetime Risk: 9.4  CLINICAL BREAST EXAM: The patient reports their last clinical breast exam was performed within the past year.  COMPARISON STUDIES: The present examination has been compared to prior imaging studies performed at [facility] on 11/10/2020, 12/01/2021, 06/02/2022 and 12/05/2022.  MAMMOGRAM FINDINGS: Mammography was performed including the following views: bilateral craniocaudal with tomosynthesis, bilateral mediolateral oblique with tomosynthesis.  The examination includes digital synthetic 2D and digital tomosynthesis 3D images. Additional imaging analysis was performed using CAD (computer-aided detection) software.  The breasts are heterogeneously dense, which may obscure small masses.  There are stable areas of benign architectural distortion corresponding to the site of surgery seen in both breasts.  No suspicious mass, grouping of calcifications, or other abnormality is identified.  IMPRESSION: There is no mammographic evidence of malignancy.  RECOMMENDATION: Unless otherwise indicated by clinical findings, annual screening mammography recommended.  ASSESSMENT: BI-RADS Category 2:  Benign   [id number]     EXAM:   US BREAST COMPLETE BI     12/08/2023    INTERPRETATION:  CLINICAL HISTORY: Dense breasts. Screening breast ultrasound.  COMPARISON: Ultrasounds dating back to 2020.  Last clinical breast examination was performed within the past year.  FAMILY HISTORY: None.  TECHNIQUE: Each breast was scanned in its entirety.   FINDINGS:  Scarring is seen at the lumpectomy site in the left breast. No suspicious masses are identified in either breast.  There is no axillary adenopathy.   IMPRESSION:  No sonographic evidence of malignancy in either breast.   Recommendation: Unless otherwise indicated by clinical findings, annual screening mammography recommended.  BI-RADS Category 2: Benign

## 2023-12-18 NOTE — ASSESSMENT
[FreeTextEntry1] : Frances is a 53 premenopausal F with an US detected L breast cancer, [id number], ER/WV positive, her 2 neg stage 1 breast cancer, s/p L WLE + SLN Bx on 6/3/19, s/p L breast mass re-excision on 7/8/19 with negative margins. stage 1 A breast cancer; now with RIGHT breast radial scar, detected on breast MRI, s/p R WLE on 7/14/2020, final pathology revealing a radial scar.   Her imaging is as follows: 12/08/2023 b/l mammo and US -->birads2 breasts are heterogeneously dense  stable areas of benign architectural distortion corresponding to the site of surgery seen in both breasts.  On exam, I was not able to palpate any suspicious abnormalities within either breast.  We discussed dense breasts. Increasing breast density has been found to increase ones risk of breast cancer, but at this time, there is no clear indication for additional imaging in this setting, as both US and MRI have not been found to improve survival. One can consider bilateral screening US. However, out of 1000 women screened, the use of routine US will only identify an additional 3-5 cancers. The use of US was found to increase the likelihood of undergoing more imaging and more biopsies. She does have dense breasts. We have decided to proceed with screening bilateral breast US at this time. This will be scheduled with her next screening mammogram.   PLAN: -MRI- June 2024 -b/l mammo and US on 12/9/24 -follow up after

## 2023-12-18 NOTE — HISTORY OF PRESENT ILLNESS
[FreeTextEntry1] : Frances is a 50 premenopausal F who presents with a US detected L breast cancer, [id number], ER/AZ positive, her 2 neg stage 1 breast cancer, s/p L WLE + SLN Bx on 6/3/19, s/p L breast re-excision on 19.  She now presents with a R breast radial scar found on MRI, s/p R WLE on 2020.  -oncotype DX 7 -no chemotherapy offered -s/p L WBI on 19 - 19  -s/p tamoxifen since 10/1/19  2020 -- L WLE  -radial scar, no atypia   Her work up was as follows:  3/13/19 -- L dx tomosynthesis; b/l US  RIGHT  -@1N2, mass measuring 1.2 x 1.4 x 0.5 cm, no change, deemed BIRADS 3 -@6N4, mass measuring 0.6 x 0.9 x 0.4 cm, no change, deemed BIRADS 3 LEFT:  -@12N2, cyst measuring 1 x 1 x 0.8 cm  -@5N5, mass measuring 1.2 x 1.4 x 0.5 cm, no change, deemed BIRADS 3 -@3-4N4, il defined mass, measuring 0.5 x 0.7 x 0.6 cm, mammographically occult, BIOPSY  BIRADS 4 of above lesion  3/18/19 -- L US CNBx @3-4N4 -micropscopic IDC, well differentiated -ER/AZ positive, Her 2 pending (Abel )  19 -- breast MRI  R: 6.7 cm nonmass enhancement in inferior R breast --> rec BIOPSY of anterior and posterior aspect  L: lateral mid depth, irregular enhancing mass measuring 0.9 cm consistent with known cancer  -b/l breast cysts  -abnormal signal in anterior right lung base   19 -- CT Chest  -3 mm solid nodule in RUL  -rec: no further imaging if patient is low risk   19 -- R MRI guided biopsy x 2  -benign proliferative type FC changes x 2  COLOR genetic testing  -negative for any mutations   6/3/19 -- L WLE with SLN Bx -IDC, well differentiated, focal lobular features, SBR 1 -T=9mm -EIC, low nuclear grade -invasive cancer extends to broadly involve inferior and medial margins -cLCIS, radial scar  -0/5 SLN + for metastatic disease  -ER/AZ pos, Her 2 neg on IHC -ONCOTYPE score of 7  19 -- L breast mass re-excision  -residual foci of IDC, 2mm, located 0.5 mm from new superior/anterior margin -extensive DCIS, low nuclear grade, 1 mm from medial/anterior margin and 1.5 mm from lateral margin   HISTORICAL RISK FACTORS:  -1 prior breast biopsy as above  -no family history of breast or ovarian cancer  - , age at first live birth was 24 -no prior OCP use  -COLOR genetic testing negative  INTERVAL HISTORY:  Frances is a 48 premenopausal F with left breast cancer.  She presents today for a 6 month follow up visit, s/p L WLE with SLN Bx on 6/3/19, s/p L breast mass re-excision on 19.    Her final pathology revealed a 9mm IDC, well differentiated, SBR 1, extensive DCIS that was low nuclear grade, however the medial and inferior margin were positive.  She had 5 LN removed which were all negative, and the tumor was ER/AZ positive, Her 2 negative on IHC, oncotype RS 7.   Her left breast re-excision revealed an additional 2mm of IDC, located 0.5 mm from her new superior/anterior margin and extensive DCIS, located 1 mm from her medial/anterior margin, and 1.5 mm from her lateral margin.   -s/p L WBI completed on 19 -started tamoxifen on 10/1/19  She has occasional sharp pains and hyperpigmentation from her radiation therapy, but otherwise has not palpated any abnormal masses, denies any nipple discharge, and denies any right sided breast complaints.   She is otherwise tolerating the tamoxifen.  She missed her menses this month, but has not had any abnormal vaginal spotting.  She also is experiencing hot flashes, but denies any shortness of breath, unilateral leg pain or swelling.   She had the following work up since her last visit:  2020 -- breast MRI  -R: @12, mid depth, 0.5 cm enhancing mass, appears indeterminate --> BIOPSY  -R: 2 foci of susceptibility artifact consistent with prior benign biopsies in inferior R breast, multiple nonenhancing cysts -L: post surgical distortion consistent with post lumpectomy changes, no suspicious enhancement  BIRADS 4  2020 -- R MRI Biopsy  -radial scar   2020 -- L dx mammogram  -stable post surgical distortion in UOQ, consistent with post lumpectomy changes  -no suspicious mass, microcalcs, architectural distortion  BIRADS 2  Her last screening mammogram was performed on 10/29/19 which was deemed BIRADS 2 for stable benign b/l masses.   INTERVAL HISTORY:  Frances is a 50 F with left breast cancer, right breast radial scar, s/p R WLE for her radial scar on 2020.  Her final pathology revealed a radial scar without atypia.    2020  Frances returns for a short term follow up visit  for left breast cancer treated in 2019.  She denies any breast related complaints at this time.  She denies any breast pain, nipple discharge or retraction, and has not palpated any new breast masses.   She is still taking the tamoxifen with minimal side effects, however the interval between her menses is increasing.    Her most recent imaging was a b/l dx mammogram and US on 11/10/2020 which revealed b/l post surgical changes, and a hypoechoic area in her right breast, retroareolar area at her surgical site, measuring 1.7 x 1.3 cm, for which a 6 month follow up US could be considered, deemed BIRADS 3.   2021 -- Frances returns 1 year follow up visit  for left breast cancer treated in 2019.   She denies any breast related complaints at this time.  She denies any breast pain, nipple discharge or retraction, and has not palpated any new breast masses.   She has been taking Tamoxifen since 10/01/2019 and is tolerating well.  She had screening MRI in 2021 which did not show suspicious finding. On 21, she had left breast dx mammo and US which showed stable postsurgical changes at the lumpectomy site in superior left breast and no new suspicious mass, microcalcifications or areas of architectural distortion seen in the left breast.   INTERVAL HISTORY 22 Frances is here for her six months follow up visit  She has no breast related complaints at this time.  She denies any breast pain, has not palpated any new palpable masses in either breast and denies any nipple discharge or retraction.  Her imaging is as follows: 2022  left dx mammo and US -breasts are heterogeneously dense -Linear marker denotes site of cutaneous scarring in the left breast.  -There is associated postsurgical distortion.  LEFT US: -@1N 5  there is a stable hypoechoic mass measuring 0.5 x 0.5 x 0.3 cm, probably benign. -@ 12 to 1:00N 7, there is a stable hypoechoic mass measuring 0.3 x 0.6 x 0.3 cm, probably benign. BIRADS3   INTERVAL HISTORY 23 Frances is here for her six months follow up visit  She has no breast related complaints at this time.  She denies any breast pain, has not palpated any new palpable masses in either breast and denies any nipple discharge or retraction.  Her imaging is as follows: 2022 b/l dx mammo and US -breasts are heterogeneously dense -Stable postsurgical changes noted in both breasts.  LEFT US: -@12-1 o'clock N7 there is a stable hypoechoic mass measuring 0.3 x 0.6 x 0.3 cm. Short interval follow-up recommended. -@1:00 N5 there is a stable hypoechoic mass measuring 0.4 x 0.4 x 0.3 cm. Short interval follow-up recommended. BIRADS3  INTERVAL HISTORY 23  #[id number] Valencia Daniels is here for her six months follow up visit  She has no breast related complaints at this time.  She denies any breast pain, has not palpated any new palpable masses in either breast and denies any nipple discharge or retraction.  Her imaging is as follows: 2023 left us-->birads1 -Previously identified masses in the left breast, 12-1:00 axis, 7 cm from the nipple and 1:00 axis, 5 cm from the nipple, are no longer seen consistent with a benign etiology.  INTERVAL HISTORY 23 Frances is here for follow up visit  She has no breast related complaints at this time.  She denies any breast pain, has not palpated any new palpable masses in either breast and denies any nipple discharge or retraction.  Her imaging is as follows: 2023 b/l mammo and US -->birads2 -breasts are heterogeneously dense  -stable areas of benign architectural distortion corresponding to the site of surgery seen in both breasts.

## 2024-04-11 ENCOUNTER — OUTPATIENT (OUTPATIENT)
Dept: OUTPATIENT SERVICES | Facility: HOSPITAL | Age: 54
LOS: 1 days | End: 2024-04-11
Payer: COMMERCIAL

## 2024-04-11 ENCOUNTER — APPOINTMENT (OUTPATIENT)
Age: 54
End: 2024-04-11
Payer: COMMERCIAL

## 2024-04-11 DIAGNOSIS — Z51.81 ENCOUNTER FOR THERAPEUTIC DRUG LVL MONITORING: ICD-10-CM

## 2024-04-11 DIAGNOSIS — Z17.0 MALIGNANT NEOPLASM OF UPPER-OUTER QUADRANT OF LEFT FEMALE BREAST: ICD-10-CM

## 2024-04-11 DIAGNOSIS — Z98.890 OTHER SPECIFIED POSTPROCEDURAL STATES: Chronic | ICD-10-CM

## 2024-04-11 DIAGNOSIS — Z79.810 ENCOUNTER FOR THERAPEUTIC DRUG LVL MONITORING: ICD-10-CM

## 2024-04-11 DIAGNOSIS — C50.412 MALIGNANT NEOPLASM OF UPPER-OUTER QUADRANT OF LEFT FEMALE BREAST: ICD-10-CM

## 2024-04-11 PROCEDURE — 99214 OFFICE O/P EST MOD 30 MIN: CPT

## 2024-04-11 NOTE — PHYSICAL EXAM
[Fully active, able to carry on all pre-disease performance without restriction] : Status 0 - Fully active, able to carry on all pre-disease performance without restriction [Obese] : obese [Normal Male] : prostate smooth, symmetric with no modularity or induration [Normal] : normal appearance, no rash, nodules, vesicles, ulcers, erythema [de-identified] : Status post left lumpectomy and SLNB. The surgical scars healed well. There is no palpable abnormality. post radiation erythema noted to left breast, no tenderness to palpation. S/p right breast excision

## 2024-04-11 NOTE — ASSESSMENT
[FreeTextEntry1] : 54 yo premenopausal female has IDC of the left breast, stage IA ( [id number]), ER/CO positive, Her 2 neg, G1, s/p lumpectomy and SLNB. RS 7, in the low-risk range.  Assessment and Plan: -- Continue Tamoxifen daily e-refilled today. She will be completing 5-year endocrine therapy in 10/2024.  -- Breast exam today reveals stable post treatment change in the left breast. There is no palpable abnormally. She will have bilateral screening mammo and left breast US in 12/2024. A referral was given.  -- Follow up with breast surgeon as scheduled. -- Follow up with PCP and GYN for health maintenance. -- RTO for followup in 6 months.

## 2024-04-11 NOTE — HISTORY OF PRESENT ILLNESS
[de-identified] : 48 yr old female is referred by Dr. Tiwari for consultation of adjuvant systemic therapy for newly diagnosed right breast cancer. The patient felt a palpable lump in her left breast  and underwent diagnostic workup.  \par  \par  Bilateral dx mammo and US on 3/13/19 revealed right breast mass  - @1N2, mass measuring 1.2 x 1.4 x 0.5 cm, no change, deemed BIRADS 3 -@6N4, mass measuring 0.6 x 0.9 x 0.4 cm, no change, deemed BIRADS 3. LEFT breast @12N2, cyst measuring 1 x 1 x 0.8 cm - @5N5, mass measuring 1.2 x 1.4 x 0.5 cm, no change, deemed BIRADS 3 -@3-4N4, ill defined mass, measuring 0.5 x 0.7 x 0.6 cm, mammographically occult, BIOPSY recommended for BIRADS 4 of above lesion\par  \par  On 3/18/19, she underwent a left US CNBx @3-4N4 which revealed microscopic IDC, well differentiated ER/CT positive, Her 2 negative\par  \par  On 19, b/l breast MRI showed Right breast 6.7 cm nonmass enhancement in inferior R breast --> rec BIOPSY of anterior and posterior aspect, and left breast lateral mid depth, irregular enhancing mass measuring 0.9 cm consistent with known cancer \par  \par  19 -- CT Chest: 3 mm solid nodule in RUL \par  \par  19 -- R MRI guided biopsy x 2 \par  -benign proliferative type FC changes x 2\par  \par  The patient had COLOR genetic testing. She is negative for any mutations.\par  \par  On 6/3/19, she underwent left WLE with SLN Bx. The pathology showed 9 mm IDC, well differentiated, focal lobular features, low nuclear grade, invasive cancer extends to broadly involve inferior and medial margins. There was cLCIS, radial scar. 5 SLNs were negative for malignancy.  The invasive tumor was ER/CT pos 80%, Her 2 neg on IHC, Ki 67 3-5%.\par  \par  The surgical specimen was sent for Oncotype dx analysis. her RS is 7, in the low risk range and predicting 3% risk of distant recurrence with endocrine therapy alone.\par  \par  The patient is previously healthy, no PMH, no family history of breast cancer or other cancers. She is , age at first live birth was 24. No prior OCP use.\par  She denies smoking or drinking. She is up to date on pap smear and colonoscopy . Her LMP was on 2019. \par  \par  She is here to discuss systemic adjuvant therapy. She is able to speak English and understands conversation well. We tried to use  service but there was connection problem.\par  \par  \par  \par  \par  \par  \par  \par   [de-identified] : 10/3/19 Patient is here today for follow up visit.  She offers no complaints She completed radiation #21 on 9/17/19 and started Tamoxifen 10/1/19.  She is tolerating Tamoxifen so far.  She continues to get monthly menses. She has a follow up appt with Dr. Tiwari on 10/16/19. Previous labs and scans reviewed.  She is due to have a CT Chest for re-evaluation of a 3 mm solid nodule RUL.  1/2/20: Patient is here today for follow up visit.  She completed adjuvant WBRT in 9/2019. She has been taking Tamoxifen since 10/1/19.  She is tolerating Tamoxifen well.   She had repeat dx mammo and US in 10/2019 which showed stable architectural distortion most consistent with postsurgical change. Two benign right breast masses and one benign left breast mass as above. No evidence of malignancy. As per post lumpectomy routine, a follow-up unilateral left mammogram is recommended.  She also had CT chest in 10/2019 which showed stable 3 mm solid nodule RUL. There was no other abnormal finding. She is feeling well and has no breast related symptoms.  7/2/2020 48 yo female is here for follow up visit for h/o  IDC of the left breast, stage IA ( [id number]), ER/AZ positive, Her 2 neg, G1, s/p lumpectomy and SLNB. She has been taking Tamoxifen 10/2019 and tolerating it well. She does not c/o any breast pain or lumps.  On 4/28/2020, she had MRI guided biopsy which showed complex sclerosing lesion (radial scar) with focal peripheral micropapillomatous architecture.  She is scheduled for a surgical excision of right breast on 7/14/2020.  She has regular menstrual periods.  LMP was  6/2020. Mammogram: 4/30/2020 shows: Stable postsurgical distortion in the left breast upper outer quadrant consistent with prior lumpectomy. No suspicious mass, microcalcifications or areas of architectural distortion is seen either breast. When compared to the previous examinations there is no significant interval change and nothing to suggest malignancy.  Impression: No mammographic evidence of malignancy. Stable postsurgical changes of the left breast.  MRI breasts 4/21/2020 shows: Postsurgical changes of the left breast, consistent with lumpectomy changes.  At the 12:00 axis of the right breast, there is a 0.5 cm indeterminate  enhancing mass for which MRI guided biopsy is recommended. Recommendation: MRI guided biopsy.  MRI Right breast bx: 4/28/2020 pathology shows: -Complex sclerosing lesion (radial scar) with focal peripheral micropapillomatous architecture.  -Benign breast tissue with proliferative type fibrocystic changes including florid duct hyperplasia associated with collagenous spherulosis, stromal  fibrosis, sclerosing and blunt duct adenosis, apocrine metaplasia-lined cysts, and microcalcifications.  -Focal mammary duct ectasia.  This is benign, high risk, concordant histology. Surgical excision is recommended.   01/04/2021 Patient is here today for follow up visit h/o IDC of the left breast, stage IA ( [id number]), ER/AZ positive, Her 2 neg, G1, s/p lumpectomy and SLNB. She has been taking Tamoxifen since 10/1/19. She is tolerating Tamoxifen well.  On 4/28/2020, she had MRI guided biopsy which showed complex sclerosing lesion (radial scar) with focal peripheral micropapillomatous architecture.  She is s/p R WLE on 7/14/2020, final pathology revealing a radial scar, no atypia. Her most recent imaging was a b/l dx mammogram and US on 11/10/2020 which revealed b/l post surgical changes, and a hypoechoic area in her right breast, retroareolar area at her surgical site, measuring 1.7 x 1.3 cm, for which a 6 month follow up US could be considered, deemed BIRADS 3. She will be due for a L dx mammogram and US on 5/10/2021. She will also be due for a breast MRI on 4/21/21.  Last LMP was 10/2020.   7/8/21: Patient is here today for follow up visit. She has stage IA ( [id number]) IDC of the left breast, ER/AZ positive, Her 2 neg, G1, s/p lumpectomy and SLNB. She has been taking Tamoxifen since 10/1/19. She is tolerating Tamoxifen well.  She had screening MRI in 4/2021 which did not show suspicious finding. On 5/11/21, she had left breast dx mammo and US which showed stable postsurgical changes at the lumpectomy site in superior left breast and no new suspicious mass, microcalcifications or areas of architectural distortion seen in the left breast. The annual screening mammography recommended. She still has her periods.  1/10/22: Patient is here today for follow up visit. She has stage IA ( [id number]) IDC of the left breast, ER/AZ positive, Her 2 neg, G1, s/p lumpectomy and SLNB. She has been taking Tamoxifen since 10/1/19. She is tolerating Tamoxifen well. She had screening MRI in 4/2021 which did not show suspicious finding. On 12/1/2, she had b/l breast dx mammo and US which showed bilateral stable postsurgical changes. No sonographic evidence of malignancy of the right breast. Probably benign left breast masses for which short-term follow-up is recommended. She has not had her period since 6/2021.  10/19/22 Patient is here today for follow up visit. She has stage IA ( [id number]) IDC of the left breast, ER/AZ positive, Her 2 neg, G1, s/p lumpectomy and SLNB. She has been taking Tamoxifen since 10/1/19. She is tolerating Tamoxifen well. In June 2022 she had left dx mammo and US which showed at 1:00 5cm from the nipple there is a stable hypoechoic mass measuring 0.5 x 0.5 x 0.3 cm, probably benign. 12 to 1:00N 7cm from the nipple, there is a stable hypoechoic mass measuring 0.3 x 0.6 x 0.3 cm, probably benign. She had MRI in 9/2022 which did not show suspicious finding. Her LMP 1/2022.   04/19/2023 Patient is here today for follow up visit. She has stage IA ( [id number]) IDC of the left breast, ER/AZ positive, Her 2 neg, G1, s/p lumpectomy and SLNB. She has been taking Tamoxifen since 10/1/19. She is tolerating Tamoxifen well. In June 2022 she had left dx mammo and US which showed at 1:00 5cm from the nipple there is a stable hypoechoic mass measuring 0.5 x 0.5 x 0.3 cm, probably benign. 12 to 1:00N 7cm from the nipple, there is a stable hypoechoic mass measuring 0.3 x 0.6 x 0.3 cm, probably benign. She had MRI in 9/2022 which did not show suspicious finding scheduled for left breast US  on 06/06/2023. Her LMP 1/2022.  10/12/23 Patient is here today for follow up visit. She has stage IA ( [id number]) IDC of the left breast, ER/AZ positive, Her 2 neg, G1, s/p lumpectomy and SLNB. She has been taking Tamoxifen since 10/1/19, tolerating well. She c/o of hair thinning. She is UTD with gyne, denies any vaginal bleeding. last meses was 2 years ago. Bilateral dx mammo/US (12/5/22) was BI-RADS 3 with stable left breast masses. Left breast US (6/6/23) was negative. Visit was done with  Linda, ID#[id number].  4/11/24: Frances is here today for follow up visit. She has stage IA ( [id number]) IDC of the left breast, ER/AZ positive, Her 2 neg, G1, s/p lumpectomy and SLNB. She has been taking Tamoxifen since 10/1/19. She has not had her periods for 2-3 years. She complains weight gain. b/l screening mammo and left breast US in 12/2023 did not show suspicious finding.

## 2024-04-12 DIAGNOSIS — C50.412 MALIGNANT NEOPLASM OF UPPER-OUTER QUADRANT OF LEFT FEMALE BREAST: ICD-10-CM

## 2024-04-17 RX ORDER — TAMOXIFEN CITRATE 20 MG/1
20 TABLET, FILM COATED ORAL DAILY
Qty: 90 | Refills: 0 | Status: ACTIVE | COMMUNITY
Start: 2019-07-05 | End: 1900-01-01

## 2024-10-16 ENCOUNTER — OUTPATIENT (OUTPATIENT)
Dept: OUTPATIENT SERVICES | Facility: HOSPITAL | Age: 54
LOS: 1 days | End: 2024-10-16
Payer: SELF-PAY

## 2024-10-16 ENCOUNTER — APPOINTMENT (OUTPATIENT)
Age: 54
End: 2024-10-16

## 2024-10-16 VITALS
SYSTOLIC BLOOD PRESSURE: 130 MMHG | OXYGEN SATURATION: 96 % | RESPIRATION RATE: 18 BRPM | TEMPERATURE: 97.4 F | HEIGHT: 59 IN | DIASTOLIC BLOOD PRESSURE: 78 MMHG | BODY MASS INDEX: 36.89 KG/M2 | WEIGHT: 183 LBS | HEART RATE: 61 BPM

## 2024-10-16 DIAGNOSIS — Z51.81 ENCOUNTER FOR THERAPEUTIC DRUG LVL MONITORING: ICD-10-CM

## 2024-10-16 DIAGNOSIS — Z79.810 ENCOUNTER FOR THERAPEUTIC DRUG LVL MONITORING: ICD-10-CM

## 2024-10-16 DIAGNOSIS — C50.412 MALIGNANT NEOPLASM OF UPPER-OUTER QUADRANT OF LEFT FEMALE BREAST: ICD-10-CM

## 2024-10-16 DIAGNOSIS — Z17.0 MALIGNANT NEOPLASM OF UPPER-OUTER QUADRANT OF LEFT FEMALE BREAST: ICD-10-CM

## 2024-10-16 DIAGNOSIS — Z98.890 OTHER SPECIFIED POSTPROCEDURAL STATES: Chronic | ICD-10-CM

## 2024-10-16 PROCEDURE — 99214 OFFICE O/P EST MOD 30 MIN: CPT

## 2024-10-17 DIAGNOSIS — C50.412 MALIGNANT NEOPLASM OF UPPER-OUTER QUADRANT OF LEFT FEMALE BREAST: ICD-10-CM

## 2024-11-01 ENCOUNTER — NON-APPOINTMENT (OUTPATIENT)
Age: 54
End: 2024-11-01

## 2024-11-01 ENCOUNTER — APPOINTMENT (OUTPATIENT)
Dept: OBGYN | Facility: CLINIC | Age: 54
End: 2024-11-01
Payer: COMMERCIAL

## 2024-11-01 ENCOUNTER — OUTPATIENT (OUTPATIENT)
Dept: OUTPATIENT SERVICES | Facility: HOSPITAL | Age: 54
LOS: 1 days | End: 2024-11-01
Payer: COMMERCIAL

## 2024-11-01 VITALS
SYSTOLIC BLOOD PRESSURE: 114 MMHG | BODY MASS INDEX: 37.29 KG/M2 | HEIGHT: 59 IN | WEIGHT: 185 LBS | DIASTOLIC BLOOD PRESSURE: 76 MMHG

## 2024-11-01 DIAGNOSIS — Z01.419 ENCOUNTER FOR GYNECOLOGICAL EXAMINATION (GENERAL) (ROUTINE) WITHOUT ABNORMAL FINDINGS: ICD-10-CM

## 2024-11-01 PROCEDURE — 87491 CHLMYD TRACH DNA AMP PROBE: CPT

## 2024-11-01 PROCEDURE — 99396 PREV VISIT EST AGE 40-64: CPT

## 2024-11-01 PROCEDURE — 87591 N.GONORRHOEAE DNA AMP PROB: CPT

## 2024-11-01 PROCEDURE — 87661 TRICHOMONAS VAGINALIS AMPLIF: CPT

## 2024-11-01 PROCEDURE — 81513 NFCT DS BV RNA VAG FLU ALG: CPT

## 2024-11-01 PROCEDURE — 87481 CANDIDA DNA AMP PROBE: CPT

## 2024-11-01 PROCEDURE — 99459 PELVIC EXAMINATION: CPT

## 2024-11-01 PROCEDURE — 99386 PREV VISIT NEW AGE 40-64: CPT

## 2024-11-01 RX ORDER — CLOTRIMAZOLE AND BETAMETHASONE DIPROPIONATE 10; .5 MG/G; MG/G
1-0.05 CREAM TOPICAL TWICE DAILY
Qty: 1 | Refills: 2 | Status: ACTIVE | COMMUNITY
Start: 2024-11-01 | End: 1900-01-01

## 2024-11-04 DIAGNOSIS — Z01.419 ENCOUNTER FOR GYNECOLOGICAL EXAMINATION (GENERAL) (ROUTINE) WITHOUT ABNORMAL FINDINGS: ICD-10-CM

## 2024-11-06 DIAGNOSIS — Z01.419 ENCOUNTER FOR GYNECOLOGICAL EXAMINATION (GENERAL) (ROUTINE) W/OUT ABNORMAL FINDINGS: ICD-10-CM

## 2024-11-06 LAB
BV BACTERIA RRNA VAG QL NAA+PROBE: DETECTED
C GLABRATA RNA VAG QL NAA+PROBE: NOT DETECTED
C TRACH RRNA SPEC QL NAA+PROBE: NOT DETECTED
CANDIDA RRNA VAG QL PROBE: NOT DETECTED
N GONORRHOEA RRNA SPEC QL NAA+PROBE: NOT DETECTED
T VAGINALIS RRNA SPEC QL NAA+PROBE: NOT DETECTED

## 2024-11-06 RX ORDER — METRONIDAZOLE 500 MG/1
500 TABLET ORAL TWICE DAILY
Qty: 14 | Refills: 0 | Status: ACTIVE | COMMUNITY
Start: 2024-11-06 | End: 1900-01-01

## 2024-11-22 NOTE — H&P PST ADULT - AIRWAY
2024   Janeen Gonzalez (: 1953) is a 71 y.o. female, New patient, here for evaluation of the following chief complaint(s):  Dizziness (Tuesday morning she felt like she was experiencing vertigo that dizzy spell lasted about 15 minutes. This morning pt stated that she experienced another episode of what seem to be vertigo. ) and Other (Pt stated she started seeing colors in the left eye for about 5 minutes that stated Saturday )     ASSESSMENT/PLAN:  Below is the assessment and plan developed based on review of pertinent history, physical exam, labs, studies, and medications.  1. Dizziness  -     EKG 12 Lead; Future  -     AMB POC GLUCOSE BLOOD, BY GLUCOSE MONITORING DEVICE  2. Elevated blood pressure reading  3. Visual disturbance of one eye    We had a long discussion that the symptoms could be coming from fluctuation of her glucose levels (both dizzy spells occurred prior to eating).  She should follow up with her PCP.    The vision disturbance could be ocular migraine or retinal problem -- follow up with Ophthalmology.    Handout given with care instructions  2. OTC for symptom management. Increase fluid intake, ensure adequate nutritional intake.  3. Follow up with PCP as needed.  4. Go to ED with development of any acute symptoms.     Follow up:  No follow-ups on file.  Follow up immediately for any new, worsening or changes or if symptoms are not improving over the next 5-7 days.     SUBJECTIVE/OBJECTIVE:  HPI     There are no diagnoses linked to this encounter.    Dizziness (Tuesday morning she felt like she was experiencing vertigo that dizzy spell lasted about 15 minutes. This morning pt stated that she experienced another episode of what seem to be vertigo. ) and Other (Pt stated she started seeing colors in the left eye for about 5 minutes that stated Saturday )  Recently started rosuvastatin but stopped Monday after these symptoms developed.    Results for orders placed or performed in 
normal

## 2025-01-02 ENCOUNTER — RESULT REVIEW (OUTPATIENT)
Age: 55
End: 2025-01-02

## 2025-01-02 ENCOUNTER — OUTPATIENT (OUTPATIENT)
Dept: OUTPATIENT SERVICES | Facility: HOSPITAL | Age: 55
LOS: 1 days | End: 2025-01-02
Payer: COMMERCIAL

## 2025-01-02 DIAGNOSIS — Z12.31 ENCOUNTER FOR SCREENING MAMMOGRAM FOR MALIGNANT NEOPLASM OF BREAST: ICD-10-CM

## 2025-01-02 DIAGNOSIS — R92.2 INCONCLUSIVE MAMMOGRAM: ICD-10-CM

## 2025-01-02 DIAGNOSIS — C50.412 MALIGNANT NEOPLASM OF UPPER-OUTER QUADRANT OF LEFT FEMALE BREAST: ICD-10-CM

## 2025-01-02 DIAGNOSIS — Z98.890 OTHER SPECIFIED POSTPROCEDURAL STATES: Chronic | ICD-10-CM

## 2025-01-02 PROCEDURE — 76641 ULTRASOUND BREAST COMPLETE: CPT | Mod: 50

## 2025-01-02 PROCEDURE — 77067 SCR MAMMO BI INCL CAD: CPT | Mod: 26

## 2025-01-02 PROCEDURE — 76641 ULTRASOUND BREAST COMPLETE: CPT | Mod: 26,50

## 2025-01-02 PROCEDURE — 77063 BREAST TOMOSYNTHESIS BI: CPT | Mod: 26

## 2025-01-02 PROCEDURE — 77063 BREAST TOMOSYNTHESIS BI: CPT

## 2025-01-02 PROCEDURE — 77067 SCR MAMMO BI INCL CAD: CPT

## 2025-01-03 DIAGNOSIS — Z12.31 ENCOUNTER FOR SCREENING MAMMOGRAM FOR MALIGNANT NEOPLASM OF BREAST: ICD-10-CM

## 2025-01-03 DIAGNOSIS — R92.2 INCONCLUSIVE MAMMOGRAM: ICD-10-CM

## 2025-04-08 ENCOUNTER — APPOINTMENT (OUTPATIENT)
Dept: OBGYN | Facility: CLINIC | Age: 55
End: 2025-04-08
Payer: COMMERCIAL

## 2025-04-08 ENCOUNTER — OUTPATIENT (OUTPATIENT)
Dept: OUTPATIENT SERVICES | Facility: HOSPITAL | Age: 55
LOS: 1 days | End: 2025-04-08
Payer: COMMERCIAL

## 2025-04-08 VITALS
WEIGHT: 190.19 LBS | BODY MASS INDEX: 38.34 KG/M2 | HEIGHT: 59 IN | DIASTOLIC BLOOD PRESSURE: 95 MMHG | SYSTOLIC BLOOD PRESSURE: 123 MMHG

## 2025-04-08 DIAGNOSIS — Z98.890 OTHER SPECIFIED POSTPROCEDURAL STATES: Chronic | ICD-10-CM

## 2025-04-08 DIAGNOSIS — N95.0 POSTMENOPAUSAL BLEEDING: ICD-10-CM

## 2025-04-08 DIAGNOSIS — Z00.00 ENCOUNTER FOR GENERAL ADULT MEDICAL EXAMINATION WITHOUT ABNORMAL FINDINGS: ICD-10-CM

## 2025-04-08 PROCEDURE — 88305 TISSUE EXAM BY PATHOLOGIST: CPT | Mod: 26

## 2025-04-08 PROCEDURE — 88305 TISSUE EXAM BY PATHOLOGIST: CPT

## 2025-04-08 PROCEDURE — 58100 BIOPSY OF UTERUS LINING: CPT

## 2025-04-08 PROCEDURE — T1013: CPT

## 2025-04-08 PROCEDURE — 99459 PELVIC EXAMINATION: CPT

## 2025-04-09 DIAGNOSIS — N95.0 POSTMENOPAUSAL BLEEDING: ICD-10-CM

## 2025-04-14 LAB — CORE LAB BIOPSY: NORMAL

## 2025-04-21 ENCOUNTER — ASOB RESULT (OUTPATIENT)
Age: 55
End: 2025-04-21

## 2025-04-21 ENCOUNTER — APPOINTMENT (OUTPATIENT)
Dept: ANTEPARTUM | Facility: CLINIC | Age: 55
End: 2025-04-21
Payer: COMMERCIAL

## 2025-04-21 ENCOUNTER — OUTPATIENT (OUTPATIENT)
Dept: OUTPATIENT SERVICES | Facility: HOSPITAL | Age: 55
LOS: 1 days | End: 2025-04-21
Payer: COMMERCIAL

## 2025-04-21 DIAGNOSIS — Z98.890 OTHER SPECIFIED POSTPROCEDURAL STATES: Chronic | ICD-10-CM

## 2025-04-21 DIAGNOSIS — Z00.00 ENCOUNTER FOR GENERAL ADULT MEDICAL EXAMINATION WITHOUT ABNORMAL FINDINGS: ICD-10-CM

## 2025-04-21 PROCEDURE — 76830 TRANSVAGINAL US NON-OB: CPT | Mod: 26

## 2025-04-21 PROCEDURE — 76856 US EXAM PELVIC COMPLETE: CPT

## 2025-04-21 PROCEDURE — 76376 3D RENDER W/INTRP POSTPROCES: CPT | Mod: 26

## 2025-04-21 PROCEDURE — 76856 US EXAM PELVIC COMPLETE: CPT | Mod: 26,59

## 2025-04-21 PROCEDURE — 76830 TRANSVAGINAL US NON-OB: CPT

## 2025-04-21 PROCEDURE — 76376 3D RENDER W/INTRP POSTPROCES: CPT

## 2025-04-22 DIAGNOSIS — E66.9 OBESITY, UNSPECIFIED: ICD-10-CM

## 2025-04-22 DIAGNOSIS — N95.0 POSTMENOPAUSAL BLEEDING: ICD-10-CM

## 2025-04-22 DIAGNOSIS — Z79.810 LONG TERM (CURRENT) USE OF SELECTIVE ESTROGEN RECEPTOR MODULATORS (SERMS): ICD-10-CM

## 2025-04-22 DIAGNOSIS — D25.0 SUBMUCOUS LEIOMYOMA OF UTERUS: ICD-10-CM

## 2025-04-29 ENCOUNTER — OUTPATIENT (OUTPATIENT)
Dept: OUTPATIENT SERVICES | Facility: HOSPITAL | Age: 55
LOS: 1 days | End: 2025-04-29
Payer: COMMERCIAL

## 2025-04-29 ENCOUNTER — APPOINTMENT (OUTPATIENT)
Dept: OBGYN | Facility: CLINIC | Age: 55
End: 2025-04-29
Payer: COMMERCIAL

## 2025-04-29 VITALS
BODY MASS INDEX: 38.52 KG/M2 | SYSTOLIC BLOOD PRESSURE: 124 MMHG | HEIGHT: 59 IN | WEIGHT: 191.06 LBS | DIASTOLIC BLOOD PRESSURE: 80 MMHG

## 2025-04-29 DIAGNOSIS — Z71.2 PERSON CONSULTING FOR EXPLANATION OF EXAMINATION OR TEST FINDINGS: ICD-10-CM

## 2025-04-29 DIAGNOSIS — Z98.890 OTHER SPECIFIED POSTPROCEDURAL STATES: Chronic | ICD-10-CM

## 2025-04-29 PROCEDURE — 99213 OFFICE O/P EST LOW 20 MIN: CPT

## 2025-04-29 PROCEDURE — T1013: CPT

## 2025-04-30 DIAGNOSIS — N84.0 POLYP OF CORPUS UTERI: ICD-10-CM

## 2025-06-19 ENCOUNTER — OUTPATIENT (OUTPATIENT)
Dept: OUTPATIENT SERVICES | Facility: HOSPITAL | Age: 55
LOS: 1 days | End: 2025-06-19
Payer: COMMERCIAL

## 2025-06-19 VITALS
WEIGHT: 289.91 LBS | HEART RATE: 64 BPM | TEMPERATURE: 98 F | SYSTOLIC BLOOD PRESSURE: 122 MMHG | HEIGHT: 60 IN | RESPIRATION RATE: 18 BRPM | OXYGEN SATURATION: 98 % | DIASTOLIC BLOOD PRESSURE: 70 MMHG

## 2025-06-19 DIAGNOSIS — N84.0 POLYP OF CORPUS UTERI: ICD-10-CM

## 2025-06-19 DIAGNOSIS — Z98.890 OTHER SPECIFIED POSTPROCEDURAL STATES: Chronic | ICD-10-CM

## 2025-06-19 DIAGNOSIS — Z01.818 ENCOUNTER FOR OTHER PREPROCEDURAL EXAMINATION: ICD-10-CM

## 2025-06-19 PROBLEM — C50.919 MALIGNANT NEOPLASM OF UNSPECIFIED SITE OF UNSPECIFIED FEMALE BREAST: Chronic | Status: INACTIVE | Noted: 2019-05-24 | Resolved: 2025-06-19

## 2025-06-19 LAB
ALBUMIN SERPL ELPH-MCNC: 4.1 G/DL — SIGNIFICANT CHANGE UP (ref 3.5–5.2)
ALP SERPL-CCNC: 108 U/L — SIGNIFICANT CHANGE UP (ref 30–115)
ALT FLD-CCNC: 22 U/L — SIGNIFICANT CHANGE UP (ref 0–41)
ANION GAP SERPL CALC-SCNC: 11 MMOL/L — SIGNIFICANT CHANGE UP (ref 7–14)
AST SERPL-CCNC: 22 U/L — SIGNIFICANT CHANGE UP (ref 0–41)
BASOPHILS # BLD AUTO: 0.04 K/UL — SIGNIFICANT CHANGE UP (ref 0–0.2)
BASOPHILS NFR BLD AUTO: 0.5 % — SIGNIFICANT CHANGE UP (ref 0–1)
BILIRUB SERPL-MCNC: 0.4 MG/DL — SIGNIFICANT CHANGE UP (ref 0.2–1.2)
BUN SERPL-MCNC: 17 MG/DL — SIGNIFICANT CHANGE UP (ref 10–20)
CALCIUM SERPL-MCNC: 9.5 MG/DL — SIGNIFICANT CHANGE UP (ref 8.4–10.5)
CHLORIDE SERPL-SCNC: 106 MMOL/L — SIGNIFICANT CHANGE UP (ref 98–110)
CO2 SERPL-SCNC: 22 MMOL/L — SIGNIFICANT CHANGE UP (ref 17–32)
CREAT SERPL-MCNC: 1.1 MG/DL — SIGNIFICANT CHANGE UP (ref 0.7–1.5)
EGFR: 60 ML/MIN/1.73M2 — SIGNIFICANT CHANGE UP
EGFR: 60 ML/MIN/1.73M2 — SIGNIFICANT CHANGE UP
EOSINOPHIL # BLD AUTO: 0.27 K/UL — SIGNIFICANT CHANGE UP (ref 0–0.7)
EOSINOPHIL NFR BLD AUTO: 3.5 % — SIGNIFICANT CHANGE UP (ref 0–8)
GLUCOSE SERPL-MCNC: 101 MG/DL — HIGH (ref 70–99)
HCT VFR BLD CALC: 39 % — SIGNIFICANT CHANGE UP (ref 37–47)
HGB BLD-MCNC: 12.9 G/DL — SIGNIFICANT CHANGE UP (ref 12–16)
IMM GRANULOCYTES NFR BLD AUTO: 0.4 % — HIGH (ref 0.1–0.3)
LYMPHOCYTES # BLD AUTO: 2.37 K/UL — SIGNIFICANT CHANGE UP (ref 1.2–3.4)
LYMPHOCYTES # BLD AUTO: 30.9 % — SIGNIFICANT CHANGE UP (ref 20.5–51.1)
MCHC RBC-ENTMCNC: 27.6 PG — SIGNIFICANT CHANGE UP (ref 27–31)
MCHC RBC-ENTMCNC: 33.1 G/DL — SIGNIFICANT CHANGE UP (ref 32–37)
MCV RBC AUTO: 83.3 FL — SIGNIFICANT CHANGE UP (ref 81–99)
MONOCYTES # BLD AUTO: 0.55 K/UL — SIGNIFICANT CHANGE UP (ref 0.1–0.6)
MONOCYTES NFR BLD AUTO: 7.2 % — SIGNIFICANT CHANGE UP (ref 1.7–9.3)
NEUTROPHILS # BLD AUTO: 4.4 K/UL — SIGNIFICANT CHANGE UP (ref 1.4–6.5)
NEUTROPHILS NFR BLD AUTO: 57.5 % — SIGNIFICANT CHANGE UP (ref 42.2–75.2)
NRBC BLD AUTO-RTO: 0 /100 WBCS — SIGNIFICANT CHANGE UP (ref 0–0)
PLATELET # BLD AUTO: 209 K/UL — SIGNIFICANT CHANGE UP (ref 130–400)
PMV BLD: 11.9 FL — HIGH (ref 7.4–10.4)
POTASSIUM SERPL-MCNC: 4.4 MMOL/L — SIGNIFICANT CHANGE UP (ref 3.5–5)
POTASSIUM SERPL-SCNC: 4.4 MMOL/L — SIGNIFICANT CHANGE UP (ref 3.5–5)
PROT SERPL-MCNC: 7.2 G/DL — SIGNIFICANT CHANGE UP (ref 6–8)
RBC # BLD: 4.68 M/UL — SIGNIFICANT CHANGE UP (ref 4.2–5.4)
RBC # FLD: 13.6 % — SIGNIFICANT CHANGE UP (ref 11.5–14.5)
SODIUM SERPL-SCNC: 139 MMOL/L — SIGNIFICANT CHANGE UP (ref 135–146)
WBC # BLD: 7.66 K/UL — SIGNIFICANT CHANGE UP (ref 4.8–10.8)
WBC # FLD AUTO: 7.66 K/UL — SIGNIFICANT CHANGE UP (ref 4.8–10.8)

## 2025-06-19 PROCEDURE — 36415 COLL VENOUS BLD VENIPUNCTURE: CPT

## 2025-06-19 PROCEDURE — 85025 COMPLETE CBC W/AUTO DIFF WBC: CPT

## 2025-06-19 PROCEDURE — 99214 OFFICE O/P EST MOD 30 MIN: CPT | Mod: 25

## 2025-06-19 PROCEDURE — 93005 ELECTROCARDIOGRAM TRACING: CPT

## 2025-06-19 PROCEDURE — 93010 ELECTROCARDIOGRAM REPORT: CPT

## 2025-06-19 PROCEDURE — 80053 COMPREHEN METABOLIC PANEL: CPT

## 2025-06-19 NOTE — H&P PST ADULT - HISTORY OF PRESENT ILLNESS
EXAM UNDER ANESTHESIA, DILATION AND CURETTAGE, HYSTEROSCOPY  53yo P1 postmenopausal, h/o ER/WV+/HER2 - stage 1 breast cancer s/p tamoxifen, presents for EMBx and TVUS results follow-up for evaluation of postmenopausal bleeding. Reports one episode of new-onsent PMB in early April which resolved after a few days, denies any further bleeding episodes since. No complaints today. Denies vaginal bleeding/discharge, abdominal pain, or dysuria. EMBx resulted fragments of benign endometrial polyp admixed with fragments of benign endometrium. No malignancy seen. TVUS noted 1cm fundal submucosal fibroid.  PATIENT CURRENTLY DENIES CHEST PAIN  SHORTNESS OF BREATH  PALPITATIONS,  DYSURIA, OR UPPER RESPIRATORY INFECTION IN PAST 2 WEEKS  denies travel outside the [place] in the past 30 days    Anesthesia Alert  NO--Difficult Airway  NO--History of neck surgery or radiation  NO--Limited ROM of neck  NO--History of Malignant hyperthermia  NO--No personal or family history of Pseudocholinesterase deficiency.  NO--Prior Anesthesia Complication  NO--Latex Allergy  NO--Loose teeth  NO--History of Rheumatoid Arthritis  NO--Bleeding risk  NO--CHRISTIAN  NO--Other_____    PT/GUARDIAN DENIES ANY RASHES, ABRASION, OR OPEN WOUNDS OR CUTS    AS PER THE PT/GUARDIAN, THIS IS HIS/HER COMPLETE MEDICAL AND SURGICAL HX, INCLUDING MEDICATIONS PRESCRIBED AND OVER THE COUNTER    Patient/guardian understands the instructions and was given the opportunity to ask questions and have them answered.    pt/guardian denies any suicidal ideation or thoughts, pt states not a threat to self or others      Duke Activity Status Index (DASI)      Revised Cardiac Risk Index for Pre-Operative Risk    Opioid Risk Assessment Tool (Female)       Family history of substance abuse            Alcohol (1)            Illegal Drugs (2)            Prescription drugs (4)       Personal history of substance abuse            Alcohol (3)            Illegal Drugs (4)            Prescription drugs (5)       Age between 16-45 (1)       History of preadolescent sexual abuse (3)       Psychological disease (ADD, ADHD, OCD, Bipolar Disorder, Schizophrenia, Depression) (2)    Scoring Totals:  Low Risk (0-3)  Moderate Risk (4-7)  High Risk (>/=8)   EXAM UNDER ANESTHESIA, DILATION AND CURETTAGE, HYSTEROSCOPY  55yo P1 postmenopausal, h/o ER/NH+/HER2 - stage 1 breast cancer s/p tamoxifen, presents for EMBx and TVUS results follow-up for evaluation of postmenopausal bleeding. Reports one episode of new-onsent PMB in early April which resolved after a few days, denies any further bleeding episodes since. No complaints today. Denies vaginal bleeding/discharge, abdominal pain, or dysuria. EMBx resulted fragments of benign endometrial polyp admixed with fragments of benign endometrium. No malignancy seen. TVUS noted 1cm fundal submucosal fibroid.  PATIENT CURRENTLY DENIES CHEST PAIN  SHORTNESS OF BREATH  PALPITATIONS,  DYSURIA, OR UPPER RESPIRATORY INFECTION IN PAST 2 WEEKS  denies travel outside the [place] in the past 30 days    Anesthesia Alert  NO--Difficult Airway  NO--History of neck surgery or radiation  NO--Limited ROM of neck  NO--History of Malignant hyperthermia  NO--No personal or family history of Pseudocholinesterase deficiency.  NO--Prior Anesthesia Complication  NO--Latex Allergy  NO--Loose teeth  NO--History of Rheumatoid Arthritis  NO--Bleeding risk  NO--CHRISTIAN      PT/GUARDIAN DENIES ANY RASHES, ABRASION, OR OPEN WOUNDS OR CUTS    AS PER THE PT/GUARDIAN, THIS IS HIS/HER COMPLETE MEDICAL AND SURGICAL HX, INCLUDING MEDICATIONS PRESCRIBED AND OVER THE COUNTER    Patient/guardian understands the instructions and was given the opportunity to ask questions and have them answered.    pt/guardian denies any suicidal ideation or thoughts, pt states not a threat to self or others      Duke Activity Status Index (DASI)      Revised Cardiac Risk Index for Pre-Operative Risk    Opioid Risk Assessment Tool (Female)       Family history of substance abuse            Alcohol (1)            Illegal Drugs (2)            Prescription drugs (4)       Personal history of substance abuse            Alcohol (3)            Illegal Drugs (4)            Prescription drugs (5)       Age between 16-45 (1)       History of preadolescent sexual abuse (3)       Psychological disease (ADD, ADHD, OCD, Bipolar Disorder, Schizophrenia, Depression) (2)    Scoring Totals:  Low Risk (0-3)  Moderate Risk (4-7)  High Risk (>/=8)   54 y.o. M presents  for EXAM UNDER ANESTHESIA, DILATION AND CURETTAGE, HYSTEROSCOPY. She admits she has a hx of  ER/TX+/HER2 - stage 1 L breast cancer s/p tamoxifen. She states in April she had one week of postmenopausal bleeding that resolved on its own.  She denies any vaginal bleeding/discharge since April. She had an EMB which was negative for malignancy. Her TVUS showed a 1cm fundal submucosal fibroid. Denies any CP, SOB, headache, dizziness, fever, dysuria, lower abdominal pain, lower back pain, vaginal itchiness/discharge, malodorous urine, hematuria, and N/V/D.     PATIENT CURRENTLY DENIES CHEST PAIN  SHORTNESS OF BREATH  PALPITATIONS,  DYSURIA, OR UPPER RESPIRATORY INFECTION IN PAST 2 WEEKS  denies travel outside the [place] in the past 30 days    Anesthesia Alert  YES--Difficult Airway +4  NO--History of neck surgery or radiation  NO--Limited ROM of neck  NO--History of Malignant hyperthermia  NO--No personal or family history of Pseudocholinesterase deficiency.  NO--Prior Anesthesia Complication  NO--Latex Allergy  NO--Loose teeth  NO--History of Rheumatoid Arthritis  NO--Bleeding risk  NO--CHRISTIAN      PT DENIES ANY RASHES, ABRASION, OR OPEN WOUNDS OR CUTS    AS PER THE PT, THIS IS HER COMPLETE MEDICAL AND SURGICAL HX, INCLUDING MEDICATIONS PRESCRIBED AND OVER THE COUNTER    Patient understands the instructions and was given the opportunity to ask questions and have them answered.    pt denies any suicidal ideation or thoughts, pt states not a threat to self or others      Duke Activity Status Index (DASI)  Duke Activity Status Index (DASI) from [employer]  on 6/19/2025  ** All calculations should be rechecked by clinician prior to use **    RESULT SUMMARY:  58.2 points  The higher the score (maximum 58.2), the higher the functional status.    9.89 METs        INPUTS:  Take care of self —> 2.75 = Yes  Walk indoors —> 1.75 = Yes  Walk 1&ndash;2 blocks on level ground —> 2.75 = Yes  Climb a flight of stairs or walk up a hill —> 5.5 = Yes  Run a short distance —> 8 = Yes  Do light work around the house —> 2.7 = Yes  Do moderate work around the house —> 3.5 = Yes  Do heavy work around the house —> 8 = Yes  Do yardwork —> 4.5 = Yes  Have sexual relations —> 5.25 = Yes  Participate in moderate recreational activities —> 6 = Yes  Participate in strenuous sports —> 7.5 = Yes        Revised Cardiac Risk Index for Pre-Operative Risk    Revised Cardiac Risk Index for Pre-Operative Risk from [employer]  on 6/19/2025  ** All calculations should be rechecked by clinician prior to use **    RESULT SUMMARY:  0 points  RCRI Score    3.9 %  Risk of major cardiac event      INPUTS:  High-risk surgery —> 0 = No  History of ischemic heart disease —> 0 = No  History of congestive heart failure —> 0 = No  History of cerebrovascular disease —> 0 = No  Pre-operative treatment with insulin —> 0 = No  Pre-operative creatinine >2 mg/dL / 176.8 µmol/L —> 0 = No      Opioid Risk Assessment Tool (Female)       Family history of substance abuse            Alcohol (1)            Illegal Drugs (2)            Prescription drugs (4)       Personal history of substance abuse            Alcohol (3)            Illegal Drugs (4)            Prescription drugs (5)       Age between 16-45 (1)       History of preadolescent sexual abuse (3)       Psychological disease (ADD, ADHD, OCD, Bipolar Disorder, Schizophrenia, Depression) (2)    Scoring Totals:  Low Risk (0-3)  Moderate Risk (4-7)  High Risk (>/=8)  low risk

## 2025-06-19 NOTE — H&P PST ADULT - NSICDXPASTMEDICALHX_GEN_ALL_CORE_FT
PAST MEDICAL HISTORY:  Carcinoma of upper-outer quadrant of left breast, estrogen receptor positive     Obesity     Postmenopausal bleeding

## 2025-06-20 DIAGNOSIS — Z01.818 ENCOUNTER FOR OTHER PREPROCEDURAL EXAMINATION: ICD-10-CM

## 2025-06-20 DIAGNOSIS — N84.0 POLYP OF CORPUS UTERI: ICD-10-CM

## 2025-06-30 NOTE — PACU DISCHARGE NOTE - PAIN:
03/08/2024 08:40 AM    BUN 20 05/19/2025 11:18 AM    CREATININE 1.0 05/19/2025 11:18 AM    GLUCOSE 102 05/19/2025 11:18 AM    GLUCOSE 100 06/16/2011 08:35 AM    CALCIUM 9.0 05/19/2025 11:18 AM           Radiology Review:  Pertinent images / reports were reviewed as a part of this visit.      CXR PA/LAT: Results for orders placed during the hospital encounter of 06/17/22    XR CHEST (2 VW)    Narrative  EXAMINATION:  TWO XRAY VIEWS OF THE CHEST    6/17/2022 12:38 pm    COMPARISON:  April 21, 2022    HISTORY:  ORDERING SYSTEM PROVIDED HISTORY: SOB  TECHNOLOGIST PROVIDED HISTORY:  Reason for exam:->SOB  Reason for Exam: SOB    FINDINGS:  Redemonstration of hyperinflation with COPD.  No evidence of focal  abnormality.  The heart, mediastinum and pleural surfaces appear unremarkable.    Impression  COPD.  No acute lung disease.  The heart, mediastinum and pleural surfaces  appear unremarkable.        Pulmonary function testing  PFT with mild airflow obstruction and statistically significant postbronchodilator response        This note was transcribed using Dragon Dictation software. Please disregard any translational errors.    Neno Sevilla MD  [facility] Pulmonary, Sleep and Critical Care   Controlled with current regime

## 2025-07-02 NOTE — ASU PATIENT PROFILE, ADULT - FALL HARM RISK - UNIVERSAL INTERVENTIONS
Bed in lowest position, wheels locked, appropriate side rails in place/Call bell, personal items and telephone in reach/Instruct patient to call for assistance before getting out of bed or chair/Non-slip footwear when patient is out of bed/[place] to call system/Physically safe environment - no spills, clutter or unnecessary equipment/Purposeful Proactive Rounding/Room/bathroom lighting operational, light cord in reach

## 2025-07-03 ENCOUNTER — OUTPATIENT (OUTPATIENT)
Dept: OUTPATIENT SERVICES | Facility: HOSPITAL | Age: 55
LOS: 1 days | Discharge: HOME | End: 2025-07-03
Payer: COMMERCIAL

## 2025-07-03 ENCOUNTER — RESULT REVIEW (OUTPATIENT)
Age: 55
End: 2025-07-03

## 2025-07-03 ENCOUNTER — TRANSCRIPTION ENCOUNTER (OUTPATIENT)
Age: 55
End: 2025-07-03

## 2025-07-03 VITALS
WEIGHT: 190.04 LBS | HEART RATE: 65 BPM | TEMPERATURE: 98 F | OXYGEN SATURATION: 96 % | HEIGHT: 60 IN | DIASTOLIC BLOOD PRESSURE: 83 MMHG | RESPIRATION RATE: 18 BRPM | SYSTOLIC BLOOD PRESSURE: 121 MMHG

## 2025-07-03 VITALS
OXYGEN SATURATION: 99 % | HEART RATE: 79 BPM | RESPIRATION RATE: 18 BRPM | DIASTOLIC BLOOD PRESSURE: 75 MMHG | SYSTOLIC BLOOD PRESSURE: 120 MMHG

## 2025-07-03 DIAGNOSIS — N84.0 POLYP OF CORPUS UTERI: ICD-10-CM

## 2025-07-03 DIAGNOSIS — Z98.890 OTHER SPECIFIED POSTPROCEDURAL STATES: Chronic | ICD-10-CM

## 2025-07-03 PROCEDURE — 58558 HYSTEROSCOPY BIOPSY: CPT

## 2025-07-03 PROCEDURE — 88305 TISSUE EXAM BY PATHOLOGIST: CPT | Mod: 26

## 2025-07-03 PROCEDURE — C1782: CPT

## 2025-07-03 PROCEDURE — 88305 TISSUE EXAM BY PATHOLOGIST: CPT

## 2025-07-03 RX ORDER — HYDROMORPHONE/SOD CHLOR,ISO/PF 2 MG/10 ML
0.5 SYRINGE (ML) INJECTION
Refills: 0 | Status: DISCONTINUED | OUTPATIENT
Start: 2025-07-03 | End: 2025-07-03

## 2025-07-03 RX ORDER — ONDANSETRON HCL/PF 4 MG/2 ML
4 VIAL (ML) INJECTION ONCE
Refills: 0 | Status: DISCONTINUED | OUTPATIENT
Start: 2025-07-03 | End: 2025-07-03

## 2025-07-03 RX ORDER — SODIUM CHLORIDE 9 G/1000ML
1000 INJECTION, SOLUTION INTRAVENOUS
Refills: 0 | Status: DISCONTINUED | OUTPATIENT
Start: 2025-07-03 | End: 2025-07-03

## 2025-07-03 RX ORDER — APREPITANT 40 MG/1
40 CAPSULE ORAL ONCE
Refills: 0 | Status: COMPLETED | OUTPATIENT
Start: 2025-07-03 | End: 2025-07-03

## 2025-07-03 RX ADMIN — APREPITANT 40 MILLIGRAM(S): 40 CAPSULE ORAL at 07:20

## 2025-07-03 NOTE — PRE-ANESTHESIA EVALUATION ADULT - NSPROPOSEDPROCEDFT_GEN_ALL_CORE
Problem: Knowledge Deficit  Goal: Knowledge of disease process/condition, treatment plan, diagnostic tests, and medications will improve  Outcome: PROGRESSING AS EXPECTED  Plan of care discussed with grandmother and questions answered.    Problem: Respiratory:  Goal: Respiratory status will improve    Intervention: Administer and titrate oxygen therapy  Room air sat greater than 90% while awake.  in place.          D&C Hysteroscopy

## 2025-07-03 NOTE — BRIEF OPERATIVE NOTE - OPERATION/FINDINGS
EUA revealed normal appear external genitalia, vagina, and cervix. Uterus sounded to 10cm. Bilateral ostia visualized with hysteroscope. Hysteroscopy revealed endometrial polyps at the left posterolateral aspect of the uterus, resected with Myosure and sent to pathology. Fluid deficit 185 cc.

## 2025-07-03 NOTE — ASU DISCHARGE PLAN (ADULT/PEDIATRIC) - CARE PROVIDER_API CALL
Joan Earl  Obstetrics & Gynecology  [address] [place] [id number]  Phone: [phone]  Fax: [phone]  Follow Up Time: 1 week

## 2025-07-03 NOTE — ASU DISCHARGE PLAN (ADULT/PEDIATRIC) - FINANCIAL ASSISTANCE
[facility] provides services at a reduced cost to those who are determined to be eligible through [facility]’s financial assistance program. Information regarding [facility]’s financial assistance program can be found by going to [email or web address] or by calling [phone].

## 2025-07-03 NOTE — BRIEF OPERATIVE NOTE - NSICDXBRIEFPROCEDURE_GEN_ALL_CORE_FT
PROCEDURES:  Hysteroscopy, with dilation and curettage of uterus and polypectomy or uterine myomectomy using [employer] tissue removal system 03-Jul-2025 08:31:21  Amrita Houston

## 2025-07-08 LAB — SURGICAL PATHOLOGY STUDY: SIGNIFICANT CHANGE UP

## 2025-07-09 DIAGNOSIS — Z79.810 LONG TERM (CURRENT) USE OF SELECTIVE ESTROGEN RECEPTOR MODULATORS (SERMS): ICD-10-CM

## 2025-07-09 DIAGNOSIS — N84.0 POLYP OF CORPUS UTERI: ICD-10-CM

## 2025-07-09 DIAGNOSIS — E66.9 OBESITY, UNSPECIFIED: ICD-10-CM

## 2025-07-09 DIAGNOSIS — Z85.3 PERSONAL HISTORY OF MALIGNANT NEOPLASM OF BREAST: ICD-10-CM

## 2025-07-18 PROBLEM — U07.1 COVID-19: Chronic | Status: ACTIVE | Noted: 2025-07-03

## 2025-07-18 PROBLEM — C50.412 MALIGNANT NEOPLASM OF UPPER-OUTER QUADRANT OF LEFT FEMALE BREAST: Chronic | Status: ACTIVE | Noted: 2025-06-19

## 2025-07-18 PROBLEM — R11.2 NAUSEA WITH VOMITING, UNSPECIFIED: Chronic | Status: ACTIVE | Noted: 2025-07-03

## 2025-07-18 PROBLEM — E66.9 OBESITY, UNSPECIFIED: Chronic | Status: ACTIVE | Noted: 2025-06-19

## 2025-07-18 PROBLEM — N95.0 POSTMENOPAUSAL BLEEDING: Chronic | Status: ACTIVE | Noted: 2025-06-19

## 2025-08-05 ENCOUNTER — OUTPATIENT (OUTPATIENT)
Dept: OUTPATIENT SERVICES | Facility: HOSPITAL | Age: 55
LOS: 1 days | End: 2025-08-05
Payer: COMMERCIAL

## 2025-08-05 ENCOUNTER — APPOINTMENT (OUTPATIENT)
Dept: OBGYN | Facility: CLINIC | Age: 55
End: 2025-08-05
Payer: COMMERCIAL

## 2025-08-05 VITALS
HEIGHT: 59 IN | DIASTOLIC BLOOD PRESSURE: 77 MMHG | WEIGHT: 191 LBS | BODY MASS INDEX: 38.51 KG/M2 | SYSTOLIC BLOOD PRESSURE: 144 MMHG

## 2025-08-05 DIAGNOSIS — Z98.890 OTHER SPECIFIED POSTPROCEDURAL STATES: Chronic | ICD-10-CM

## 2025-08-05 DIAGNOSIS — Z98.890 OTHER SPECIFIED POSTPROCEDURAL STATES: ICD-10-CM

## 2025-08-05 PROCEDURE — 99212 OFFICE O/P EST SF 10 MIN: CPT

## 2025-08-06 DIAGNOSIS — Z48.816 ENCOUNTER FOR SURGICAL AFTERCARE FOLLOWING SURGERY ON THE GENITOURINARY SYSTEM: ICD-10-CM
